# Patient Record
Sex: FEMALE | URBAN - METROPOLITAN AREA
[De-identification: names, ages, dates, MRNs, and addresses within clinical notes are randomized per-mention and may not be internally consistent; named-entity substitution may affect disease eponyms.]

---

## 2021-07-22 ENCOUNTER — HOSPITAL ENCOUNTER (OUTPATIENT)
Age: 36
Setting detail: SPECIMEN
Discharge: HOME | End: 2021-07-22
Payer: COMMERCIAL

## 2021-07-26 LAB
HPV SAMPLE: ABNORMAL
HPV, GENOTYPE 16: NOT DETECTED
HPV, GENOTYPE 18: NOT DETECTED
HPV, HIGH RISK OTHER: DETECTED
HPV, INTERPRETATION: ABNORMAL
SPECIMEN DESCRIPTION: ABNORMAL

## 2021-07-28 LAB — CYTOLOGY REPORT: NORMAL

## 2021-08-24 ENCOUNTER — APPOINTMENT (OUTPATIENT)
Dept: CT IMAGING | Age: 36
DRG: 003 | End: 2021-08-24
Payer: COMMERCIAL

## 2021-08-24 ENCOUNTER — HOSPITAL ENCOUNTER (INPATIENT)
Age: 36
LOS: 29 days | Discharge: NURSING, REHAB OR LONG-TERM CARE | DRG: 003 | End: 2021-09-22
Attending: EMERGENCY MEDICINE | Admitting: SURGERY
Payer: COMMERCIAL

## 2021-08-24 ENCOUNTER — APPOINTMENT (OUTPATIENT)
Dept: GENERAL RADIOLOGY | Age: 36
DRG: 003 | End: 2021-08-24
Payer: COMMERCIAL

## 2021-08-24 DIAGNOSIS — S06.5XAA SUBDURAL HEMATOMA: ICD-10-CM

## 2021-08-24 DIAGNOSIS — I61.5 INTRAVENTRICULAR HEMORRHAGE (HCC): ICD-10-CM

## 2021-08-24 DIAGNOSIS — S32.9XXA CLOSED DISPLACED FRACTURE OF PELVIS, UNSPECIFIED PART OF PELVIS, INITIAL ENCOUNTER (HCC): ICD-10-CM

## 2021-08-24 DIAGNOSIS — I60.9 SUBARACHNOID HEMORRHAGE (HCC): ICD-10-CM
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
## 2021-08-24 PROBLEM — S22.42XA FRACTURE OF MULTIPLE RIBS OF LEFT SIDE: Status: ACTIVE | Noted: 2021-08-24
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
## 2021-08-24 PROBLEM — S32.10XA SACRAL FRACTURE (HCC): Status: ACTIVE | Noted: 2021-08-24

## 2021-08-24 PROBLEM — J96.00 ACUTE RESPIRATORY FAILURE (HCC): Status: ACTIVE | Noted: 2021-08-24

## 2021-08-24 PROBLEM — S32.599A INFERIOR PUBIC RAMUS FRACTURE (HCC): Status: ACTIVE | Noted: 2021-08-24

## 2021-08-24 PROBLEM — S32.409A ACETABULUM FRACTURE (HCC): Status: ACTIVE | Noted: 2021-08-24

## 2021-08-24 LAB
ABO/RH: NORMAL
ALLEN TEST: ABNORMAL
ALLEN TEST: ABNORMAL
ANION GAP SERPL CALCULATED.3IONS-SCNC: 15 MMOL/L (ref 9–17)
ANTIBODY SCREEN: NEGATIVE
ARM BAND NUMBER: NORMAL
BLOOD BANK SPECIMEN: ABNORMAL
BUN BLDV-MCNC: 10 MG/DL (ref 6–20)
CARBOXYHEMOGLOBIN: 3.2 % (ref 0–5)
CHLORIDE BLD-SCNC: 103 MMOL/L (ref 98–107)
CO2: 21 MMOL/L (ref 20–31)
CREAT SERPL-MCNC: 0.68 MG/DL (ref 0.5–0.9)
ETHANOL PERCENT: <0.01 %
ETHANOL: <10 MG/DL
EXPIRATION DATE: NORMAL
FIO2: 100
FIO2: ABNORMAL
GFR AFRICAN AMERICAN: ABNORMAL ML/MIN
GFR NON-AFRICAN AMERICAN: ABNORMAL ML/MIN
GFR SERPL CREATININE-BSD FRML MDRD: ABNORMAL ML/MIN/{1.73_M2}
GFR SERPL CREATININE-BSD FRML MDRD: ABNORMAL ML/MIN/{1.73_M2}
GLUCOSE BLD-MCNC: 148 MG/DL (ref 70–99)
HCG QUALITATIVE: NEGATIVE
HCO3 VENOUS: 22.7 MMOL/L (ref 24–30)
HCT VFR BLD CALC: 42.2 % (ref 36.3–47.1)
HEMOGLOBIN: 13.7 G/DL (ref 11.9–15.1)
INR BLD: 1.1
INR BLD: ABNORMAL
LV EF: 60 %
LVEF MODALITY: NORMAL
MCH RBC QN AUTO: 28.1 PG (ref 25.2–33.5)
MCHC RBC AUTO-ENTMCNC: 32.5 G/DL (ref 28.4–34.8)
MCV RBC AUTO: 86.7 FL (ref 82.6–102.9)
METHEMOGLOBIN: ABNORMAL % (ref 0–1.5)
MODE: ABNORMAL
MODE: ABNORMAL
NEGATIVE BASE EXCESS, ART: ABNORMAL (ref 0–2)
NEGATIVE BASE EXCESS, VEN: 1.6 MMOL/L (ref 0–2)
NOTIFICATION TIME: ABNORMAL
NOTIFICATION: ABNORMAL
NRBC AUTOMATED: 0 PER 100 WBC
O2 DEVICE/FLOW/%: ABNORMAL
O2 DEVICE/FLOW/%: ABNORMAL
O2 SAT, VEN: 95.9 % (ref 60–85)
OXYHEMOGLOBIN: ABNORMAL % (ref 95–98)
PARTIAL THROMBOPLASTIN TIME: 20.2 SEC (ref 20.5–30.5)
PARTIAL THROMBOPLASTIN TIME: ABNORMAL SEC
PATIENT TEMP: 37
PATIENT TEMP: 37.4
PCO2, VEN, TEMP ADJ: ABNORMAL MMHG (ref 39–55)
PCO2, VEN: 39.2 (ref 39–55)
PDW BLD-RTO: 12.2 % (ref 11.8–14.4)
PEEP/CPAP: ABNORMAL
PH VENOUS: 7.38 (ref 7.32–7.42)
PH, VEN, TEMP ADJ: ABNORMAL (ref 7.32–7.42)
PLATELET # BLD: ABNORMAL K/UL (ref 138–453)
PLATELET, FLUORESCENCE: NORMAL K/UL (ref 138–453)
PLATELET, IMMATURE FRACTION: NORMAL % (ref 1.1–10.3)
PMV BLD AUTO: ABNORMAL FL (ref 8.1–13.5)
PO2, VEN, TEMP ADJ: ABNORMAL MMHG (ref 30–50)
PO2, VEN: 77.8 (ref 30–50)
POC HCO3: 25.2 MMOL/L (ref 21–28)
POC LACTIC ACID: 2.01 MMOL/L (ref 0.56–1.39)
POC O2 SATURATION: 100 % (ref 94–98)
POC PCO2 TEMP: 38 MM HG
POC PCO2: 37.6 MM HG (ref 35–48)
POC PH TEMP: 7.43
POC PH: 7.43 (ref 7.35–7.45)
POC PO2 TEMP: 615 MM HG
POC PO2: 612 MM HG (ref 83–108)
POSITIVE BASE EXCESS, ART: 1 (ref 0–3)
POSITIVE BASE EXCESS, VEN: ABNORMAL MMOL/L (ref 0–2)
POTASSIUM SERPL-SCNC: 3.4 MMOL/L (ref 3.7–5.3)
PROTHROMBIN TIME: 11.9 SEC (ref 9.1–12.3)
PROTHROMBIN TIME: ABNORMAL SEC
PSV: ABNORMAL
PT. POSITION: ABNORMAL
RBC # BLD: 4.87 M/UL (ref 3.95–5.11)
RESPIRATORY RATE: ABNORMAL
SAMPLE SITE: ABNORMAL
SAMPLE SITE: ABNORMAL
SARS-COV-2, RAPID: NOT DETECTED
SET RATE: ABNORMAL
SODIUM BLD-SCNC: 139 MMOL/L (ref 135–144)
SPECIMEN DESCRIPTION: NORMAL
TCO2 (CALC), ART: ABNORMAL MMOL/L (ref 22–29)
TEXT FOR RESPIRATORY: ABNORMAL
TOTAL HB: ABNORMAL G/DL (ref 12–16)
TOTAL RATE: ABNORMAL
TROPONIN INTERP: NORMAL
TROPONIN T: NORMAL NG/ML
TROPONIN, HIGH SENSITIVITY: <6 NG/L (ref 0–14)
VT: ABNORMAL
WBC # BLD: 27.3 K/UL (ref 3.5–11.3)

## 2021-08-24 PROCEDURE — 82565 ASSAY OF CREATININE: CPT

## 2021-08-24 PROCEDURE — 36620 INSERTION CATHETER ARTERY: CPT

## 2021-08-24 PROCEDURE — 2000000000 HC ICU R&B

## 2021-08-24 PROCEDURE — 85610 PROTHROMBIN TIME: CPT

## 2021-08-24 PROCEDURE — 99283 EMERGENCY DEPT VISIT LOW MDM: CPT

## 2021-08-24 PROCEDURE — 80051 ELECTROLYTE PANEL: CPT

## 2021-08-24 PROCEDURE — 84703 CHORIONIC GONADOTROPIN ASSAY: CPT

## 2021-08-24 PROCEDURE — 70486 CT MAXILLOFACIAL W/O DYE: CPT

## 2021-08-24 PROCEDURE — 84484 ASSAY OF TROPONIN QUANT: CPT

## 2021-08-24 PROCEDURE — 70450 CT HEAD/BRAIN W/O DYE: CPT

## 2021-08-24 PROCEDURE — 6370000000 HC RX 637 (ALT 250 FOR IP): Performed by: STUDENT IN AN ORGANIZED HEALTH CARE EDUCATION/TRAINING PROGRAM

## 2021-08-24 PROCEDURE — 71045 X-RAY EXAM CHEST 1 VIEW: CPT

## 2021-08-24 PROCEDURE — G0480 DRUG TEST DEF 1-7 CLASSES: HCPCS

## 2021-08-24 PROCEDURE — 72125 CT NECK SPINE W/O DYE: CPT

## 2021-08-24 PROCEDURE — 71260 CT THORAX DX C+: CPT

## 2021-08-24 PROCEDURE — 94002 VENT MGMT INPAT INIT DAY: CPT

## 2021-08-24 PROCEDURE — 2500000003 HC RX 250 WO HCPCS: Performed by: STUDENT IN AN ORGANIZED HEALTH CARE EDUCATION/TRAINING PROGRAM

## 2021-08-24 PROCEDURE — 82805 BLOOD GASES W/O2 SATURATION: CPT

## 2021-08-24 PROCEDURE — 3209999900 CT THORACIC SPINE TRAUMA RECONSTRUCTION

## 2021-08-24 PROCEDURE — 85055 RETICULATED PLATELET ASSAY: CPT

## 2021-08-24 PROCEDURE — 5A1955Z RESPIRATORY VENTILATION, GREATER THAN 96 CONSECUTIVE HOURS: ICD-10-PCS | Performed by: SURGERY

## 2021-08-24 PROCEDURE — 83605 ASSAY OF LACTIC ACID: CPT

## 2021-08-24 PROCEDURE — 85730 THROMBOPLASTIN TIME PARTIAL: CPT

## 2021-08-24 PROCEDURE — 2580000003 HC RX 258: Performed by: STUDENT IN AN ORGANIZED HEALTH CARE EDUCATION/TRAINING PROGRAM

## 2021-08-24 PROCEDURE — 2700000000 HC OXYGEN THERAPY PER DAY

## 2021-08-24 PROCEDURE — 6360000002 HC RX W HCPCS: Performed by: STUDENT IN AN ORGANIZED HEALTH CARE EDUCATION/TRAINING PROGRAM

## 2021-08-24 PROCEDURE — 3209999900 CT LUMBAR SPINE TRAUMA RECONSTRUCTION

## 2021-08-24 PROCEDURE — 93306 TTE W/DOPPLER COMPLETE: CPT

## 2021-08-24 PROCEDURE — 82803 BLOOD GASES ANY COMBINATION: CPT

## 2021-08-24 PROCEDURE — 87635 SARS-COV-2 COVID-19 AMP PRB: CPT

## 2021-08-24 PROCEDURE — 72190 X-RAY EXAM OF PELVIS: CPT

## 2021-08-24 PROCEDURE — 6360000004 HC RX CONTRAST MEDICATION: Performed by: NURSE PRACTITIONER

## 2021-08-24 PROCEDURE — 93005 ELECTROCARDIOGRAM TRACING: CPT | Performed by: STUDENT IN AN ORGANIZED HEALTH CARE EDUCATION/TRAINING PROGRAM

## 2021-08-24 PROCEDURE — 94761 N-INVAS EAR/PLS OXIMETRY MLT: CPT

## 2021-08-24 PROCEDURE — 76376 3D RENDER W/INTRP POSTPROCES: CPT

## 2021-08-24 PROCEDURE — APPNB30 APP NON BILLABLE TIME 0-30 MINS: Performed by: PHYSICIAN ASSISTANT

## 2021-08-24 PROCEDURE — 84520 ASSAY OF UREA NITROGEN: CPT

## 2021-08-24 PROCEDURE — 82947 ASSAY GLUCOSE BLOOD QUANT: CPT

## 2021-08-24 PROCEDURE — 86901 BLOOD TYPING SEROLOGIC RH(D): CPT

## 2021-08-24 PROCEDURE — 86850 RBC ANTIBODY SCREEN: CPT

## 2021-08-24 PROCEDURE — 6810039000 HC L1 TRAUMA ALERT

## 2021-08-24 PROCEDURE — 85027 COMPLETE CBC AUTOMATED: CPT

## 2021-08-24 PROCEDURE — 86900 BLOOD TYPING SEROLOGIC ABO: CPT

## 2021-08-24 RX ORDER — POLYETHYLENE GLYCOL 3350 17 G/17G
17 POWDER, FOR SOLUTION ORAL DAILY
Status: DISCONTINUED | OUTPATIENT
Start: 2021-08-24 | End: 2021-09-01

## 2021-08-24 RX ORDER — ACETAMINOPHEN 500 MG
1000 TABLET ORAL EVERY 8 HOURS SCHEDULED
Status: DISCONTINUED | OUTPATIENT
Start: 2021-08-24 | End: 2021-09-13

## 2021-08-24 RX ORDER — ONDANSETRON 4 MG/1
4 TABLET, ORALLY DISINTEGRATING ORAL EVERY 8 HOURS PRN
Status: DISCONTINUED | OUTPATIENT
Start: 2021-08-24 | End: 2021-08-31

## 2021-08-24 RX ORDER — FENTANYL CITRATE 50 UG/ML
INJECTION, SOLUTION INTRAMUSCULAR; INTRAVENOUS
Status: DISCONTINUED
Start: 2021-08-24 | End: 2021-08-25

## 2021-08-24 RX ORDER — SODIUM CHLORIDE 0.9 % (FLUSH) 0.9 %
5-40 SYRINGE (ML) INJECTION PRN
Status: DISCONTINUED | OUTPATIENT
Start: 2021-08-24 | End: 2021-09-13

## 2021-08-24 RX ORDER — 0.9 % SODIUM CHLORIDE 0.9 %
500 INTRAVENOUS SOLUTION INTRAVENOUS ONCE
Status: COMPLETED | OUTPATIENT
Start: 2021-08-24 | End: 2021-08-24

## 2021-08-24 RX ORDER — SODIUM PHOSPHATE, DIBASIC AND SODIUM PHOSPHATE, MONOBASIC 7; 19 G/133ML; G/133ML
1 ENEMA RECTAL DAILY PRN
Status: DISCONTINUED | OUTPATIENT
Start: 2021-08-24 | End: 2021-08-25

## 2021-08-24 RX ORDER — MAGNESIUM SULFATE IN WATER 40 MG/ML
2000 INJECTION, SOLUTION INTRAVENOUS ONCE
Status: COMPLETED | OUTPATIENT
Start: 2021-08-24 | End: 2021-08-24

## 2021-08-24 RX ORDER — FENTANYL CITRATE 50 UG/ML
INJECTION, SOLUTION INTRAMUSCULAR; INTRAVENOUS
Status: DISCONTINUED
Start: 2021-08-24 | End: 2021-08-24

## 2021-08-24 RX ORDER — ONDANSETRON 2 MG/ML
4 INJECTION INTRAMUSCULAR; INTRAVENOUS EVERY 6 HOURS PRN
Status: DISCONTINUED | OUTPATIENT
Start: 2021-08-24 | End: 2021-09-02

## 2021-08-24 RX ORDER — SODIUM CHLORIDE 9 MG/ML
INJECTION, SOLUTION INTRAVENOUS CONTINUOUS
Status: DISCONTINUED | OUTPATIENT
Start: 2021-08-24 | End: 2021-08-30

## 2021-08-24 RX ORDER — SODIUM CHLORIDE 9 MG/ML
25 INJECTION, SOLUTION INTRAVENOUS PRN
Status: DISCONTINUED | OUTPATIENT
Start: 2021-08-24 | End: 2021-08-31

## 2021-08-24 RX ORDER — METHOCARBAMOL 750 MG/1
750 TABLET, FILM COATED ORAL EVERY 6 HOURS
Status: DISCONTINUED | OUTPATIENT
Start: 2021-08-24 | End: 2021-08-29

## 2021-08-24 RX ORDER — SODIUM CHLORIDE 0.9 % (FLUSH) 0.9 %
5-40 SYRINGE (ML) INJECTION EVERY 12 HOURS SCHEDULED
Status: DISCONTINUED | OUTPATIENT
Start: 2021-08-24 | End: 2021-09-13

## 2021-08-24 RX ORDER — CHLORHEXIDINE GLUCONATE 0.12 MG/ML
15 RINSE ORAL 2 TIMES DAILY
Status: DISCONTINUED | OUTPATIENT
Start: 2021-08-24 | End: 2021-09-15

## 2021-08-24 RX ORDER — DEXMEDETOMIDINE HYDROCHLORIDE 4 UG/ML
.2-1.4 INJECTION, SOLUTION INTRAVENOUS CONTINUOUS
Status: DISCONTINUED | OUTPATIENT
Start: 2021-08-24 | End: 2021-08-25

## 2021-08-24 RX ORDER — BISACODYL 10 MG
10 SUPPOSITORY, RECTAL RECTAL DAILY
Status: DISCONTINUED | OUTPATIENT
Start: 2021-08-24 | End: 2021-08-30

## 2021-08-24 RX ORDER — GABAPENTIN 300 MG/1
300 CAPSULE ORAL EVERY 8 HOURS
Status: DISCONTINUED | OUTPATIENT
Start: 2021-08-24 | End: 2021-08-29

## 2021-08-24 RX ORDER — OXYCODONE HYDROCHLORIDE 5 MG/1
5 TABLET ORAL EVERY 6 HOURS PRN
Status: DISCONTINUED | OUTPATIENT
Start: 2021-08-24 | End: 2021-08-25

## 2021-08-24 RX ORDER — PROPOFOL 10 MG/ML
INJECTION, EMULSION INTRAVENOUS
Status: DISCONTINUED
Start: 2021-08-24 | End: 2021-08-25

## 2021-08-24 RX ADMIN — DEXMEDETOMIDINE HYDROCHLORIDE 0.2 MCG/KG/HR: 4 INJECTION, SOLUTION INTRAVENOUS at 15:59

## 2021-08-24 RX ADMIN — CHLORHEXIDINE GLUCONATE 0.12% ORAL RINSE 15 ML: 1.2 LIQUID ORAL at 21:29

## 2021-08-24 RX ADMIN — ACETAMINOPHEN 1000 MG: 500 TABLET ORAL at 21:29

## 2021-08-24 RX ADMIN — SODIUM CHLORIDE, PRESERVATIVE FREE 10 ML: 5 INJECTION INTRAVENOUS at 21:29

## 2021-08-24 RX ADMIN — IOPAMIDOL 130 ML: 755 INJECTION, SOLUTION INTRAVENOUS at 13:29

## 2021-08-24 RX ADMIN — SODIUM CHLORIDE: 9 INJECTION, SOLUTION INTRAVENOUS at 15:57

## 2021-08-24 RX ADMIN — FAMOTIDINE 20 MG: 10 INJECTION INTRAVENOUS at 21:29

## 2021-08-24 RX ADMIN — METHOCARBAMOL TABLETS 750 MG: 750 TABLET, COATED ORAL at 17:00

## 2021-08-24 RX ADMIN — METHOCARBAMOL TABLETS 750 MG: 750 TABLET, COATED ORAL at 21:29

## 2021-08-24 RX ADMIN — GABAPENTIN 300 MG: 300 CAPSULE ORAL at 17:00

## 2021-08-24 RX ADMIN — SODIUM CHLORIDE 500 ML: 0.9 INJECTION, SOLUTION INTRAVENOUS at 19:48

## 2021-08-24 RX ADMIN — MAGNESIUM SULFATE 2000 MG: 2 INJECTION INTRAVENOUS at 15:59

## 2021-08-24 RX ADMIN — Medication 50 MCG/HR: at 17:39

## 2021-08-24 RX ADMIN — ACETAMINOPHEN 1000 MG: 500 TABLET ORAL at 15:58

## 2021-08-24 ASSESSMENT — PULMONARY FUNCTION TESTS
PIF_VALUE: 21
PIF_VALUE: 18
PIF_VALUE: 18
PIF_VALUE: 19
PIF_VALUE: 24
PIF_VALUE: 19
PIF_VALUE: 27
PIF_VALUE: 19
PIF_VALUE: 20
PIF_VALUE: 24

## 2021-08-24 NOTE — PLAN OF CARE
Problem: OXYGENATION/RESPIRATORY FUNCTION  Goal: Patient will maintain patent airway  8/24/2021 1916 by Kennedy Mujica RCP  Outcome: Ongoing     Problem: OXYGENATION/RESPIRATORY FUNCTION  Goal: Patient will achieve/maintain normal respiratory rate/effort  Description: Respiratory rate and effort will be within normal limits for the patient  8/24/2021 1916 by Kennedy Mujica RCP  Outcome: Ongoing     Problem: MECHANICAL VENTILATION  Goal: Patient will maintain patent airway  8/24/2021 1916 by Kennedy Mujica RCP  Outcome: Ongoing     Problem: MECHANICAL VENTILATION  Goal: Oral health is maintained or improved  8/24/2021 1916 by Kennedy Mujica RCP  Outcome: Ongoing     Problem: MECHANICAL VENTILATION  Goal: ET tube will be managed safely  8/24/2021 1916 by Kennedy Mujica RCP  Outcome: Ongoing     Problem: MECHANICAL VENTILATION  Goal: Ability to express needs and understand communication  8/24/2021 1916 by Kennedy Mujica RCP  Outcome: Ongoing     Problem: MECHANICAL VENTILATION  Goal: Mobility/activity is maintained at optimum level for patient  8/24/2021 1916 by Kennedy Mujica RCP  Outcome: Ongoing
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
## 2021-08-24 NOTE — PLAN OF CARE
Problem: OXYGENATION/RESPIRATORY FUNCTION  Goal: Patient will maintain patent airway  8/24/2021 1749 by Beulah Colin RN  Outcome: Ongoing  8/24/2021 1410 by Aida Wheeler RCP  Outcome: Ongoing  Goal: Patient will achieve/maintain normal respiratory rate/effort  Description: Respiratory rate and effort will be within normal limits for the patient  8/24/2021 1749 by Beulah Colin RN  Outcome: Ongoing  8/24/2021 1410 by Aida Wheeler RCP  Outcome: Ongoing     Problem: MECHANICAL VENTILATION  Goal: Patient will maintain patent airway  8/24/2021 1749 by Beulah Colin RN  Outcome: Ongoing  8/24/2021 1410 by Aida Wheeler RCP  Outcome: Ongoing  Goal: Oral health is maintained or improved  8/24/2021 1749 by Beulah Colin RN  Outcome: Ongoing  8/24/2021 1410 by Aida Wheeler RCP  Outcome: Ongoing  Goal: ET tube will be managed safely  8/24/2021 1749 by Beulah Colin RN  Outcome: Ongoing  8/24/2021 1410 by Aida Wheeler RCP  Outcome: Ongoing  Goal: Ability to express needs and understand communication  8/24/2021 [phone] by Beulah Colin RN  Outcome: Ongoing  8/24/2021 1410 by Aida Wheeler RCP  Outcome: Ongoing  Goal: Mobility/activity is maintained at optimum level for patient  8/24/2021 1749 by Beulah Colin RN  Outcome: Ongoing  8/24/2021 1410 by Aida Wheeler RCP  Outcome: Ongoing     Problem: SKIN INTEGRITY  Goal: Skin integrity is maintained or improved  8/24/2021 1749 by Beulah Colin RN  Outcome: Ongoing  8/24/2021 1410 by Aida Wheeler RCP  Outcome: Ongoing     Problem: Confusion - Acute:  Goal: Absence of continued neurological deterioration signs and symptoms  Description: Absence of continued neurological deterioration signs and symptoms  Outcome: Ongoing  Goal: Mental status will be restored to baseline  Description: Mental status will be restored to baseline  Outcome: Ongoing     Problem: Discharge Planning:  Goal: Ability to perform activities of daily living will improve  Description: Ability to perform activities of daily living will improve  Outcome: Ongoing  Goal: Participates in care planning  Description: Participates in care planning  Outcome: Ongoing     Problem: Injury - Risk of, Physical Injury:  Goal: Absence of physical injury  Description: Absence of physical injury  Outcome: Ongoing  Goal: Will remain free from falls  Description: Will remain free from falls  Outcome: Ongoing     Problem: Mood - Altered:  Goal: Mood stable  Description: Mood stable  Outcome: Ongoing  Goal: Absence of abusive behavior  Description: Absence of abusive behavior  Outcome: Ongoing  Goal: Verbalizations of feeling emotionally comfortable while being cared for will increase  Description: Verbalizations of feeling emotionally comfortable while being cared for will increase  Outcome: Ongoing     Problem: Psychomotor Activity - Altered:  Goal: Absence of psychomotor disturbance signs and symptoms  Description: Absence of psychomotor disturbance signs and symptoms  Outcome: Ongoing     Problem: Sensory Perception - Impaired:  Goal: Demonstrations of improved sensory functioning will increase  Description: Demonstrations of improved sensory functioning will increase  Outcome: Ongoing  Goal: Decrease in sensory misperception frequency  Description: Decrease in sensory misperception frequency  Outcome: Ongoing  Goal: Able to refrain from responding to false sensory perceptions  Description: Able to refrain from responding to false sensory perceptions  Outcome: Ongoing  Goal: Demonstrates accurate environmental perceptions  Description: Demonstrates accurate environmental perceptions  Outcome: Ongoing  Goal: Able to distinguish between reality-based and nonreality-based thinking  Description: Able to distinguish between reality-based and nonreality-based thinking  Outcome: Ongoing  Goal: Able to interrupt nonreality-based thinking  Description: Able to interrupt nonreality-based thinking  Outcome: Ongoing     Problem: Sleep Pattern Disturbance:  Goal: Appears well-rested  Description: Appears well-rested  Outcome: Ongoing     Problem: Skin Integrity:  Goal: Will show no infection signs and symptoms  Description: Will show no infection signs and symptoms  Outcome: Ongoing  Goal: Absence of new skin breakdown  Description: Absence of new skin breakdown  Outcome: Ongoing     Problem: Falls - Risk of:  Goal: Absence of physical injury  Description: Absence of physical injury  Outcome: Ongoing  Goal: Will remain free from falls  Description: Will remain free from falls  Outcome: Ongoing     Problem: Non-Violent Restraints  Goal: Removal from restraints as soon as assessed to be safe  Outcome: Ongoing  Goal: No harm/injury to patient while restraints in use  Outcome: Ongoing  Goal: Patient's dignity will be maintained  Outcome: Ongoing

## 2021-08-24 NOTE — PROCEDURES
PROCEDURE NOTE - ARTERIAL LINE PLACEMENT    PATIENT NAME: Deepali Carcamo  MEDICAL RECORD NO. [id number]  DATE: 8/24/2021  ATTENDING PHYSICIAN:  Hugh Ramos      PREOPERATIVE DIAGNOSIS:  Need for blood pressure monitoring  POSTOPERATIVE DIAGNOSIS:  Same  PROCEDURE PERFORMED: Right Femoral Arterial Line Insertion  PERFORMING PHYSICIAN:  Tremayne Mcadams MD  ESTIMATED BLOOD LOSS:  Less than 25 ml  COMPLICATIONS:  None immediately appreciated. DISCUSSION:  Deepali Carcamo is a 38 y.o. female who requires invasive pressure monitoring. The history and physical examination were reviewed and confirmed. CONSENT: Unable to be obtained due to patient's condition. PROCEDURE:  A timeout was initiated by the bedside nurse and was confirmed by those present. The patient was placed in a supine position. The skin overlying the Right Femoral was prepped with chlorhexadine. Through this region, the introducer needle through catheter was inserted into  until pulsatile bright blood was seen in collection tubing. Guidewire was advanced with no resistance. Catheter was advanced into the artery, wire was pulled with brisk bleeding noted. Pressure monitoring setup was connected to the catheter, it aspirated and flushed easily. The catheter was secured to the wrist with 3-0 silk. No immediate complication was evident. All sponge, instrument and needle counts were correct at the completion of the procedure.       Chucky Salas MD  6:13 PM, 8/24/21
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
## 2021-08-25 ENCOUNTER — APPOINTMENT (OUTPATIENT)
Dept: GENERAL RADIOLOGY | Age: 36
DRG: 003 | End: 2021-08-25
Payer: COMMERCIAL

## 2021-08-25 ENCOUNTER — APPOINTMENT (OUTPATIENT)
Dept: CT IMAGING | Age: 36
DRG: 003 | End: 2021-08-25
Payer: COMMERCIAL

## 2021-08-25 LAB
ABSOLUTE EOS #: <0.03 K/UL (ref 0–0.44)
ABSOLUTE IMMATURE GRANULOCYTE: 0.11 K/UL (ref 0–0.3)
ABSOLUTE LYMPH #: 1.3 K/UL (ref 1.1–3.7)
ABSOLUTE MONO #: 0.96 K/UL (ref 0.1–1.2)
ALLEN TEST: ABNORMAL
ANION GAP SERPL CALCULATED.3IONS-SCNC: 10 MMOL/L (ref 9–17)
BASOPHILS # BLD: 0 % (ref 0–2)
BASOPHILS ABSOLUTE: 0.04 K/UL (ref 0–0.2)
BUN BLDV-MCNC: 16 MG/DL (ref 6–20)
BUN/CREAT BLD: ABNORMAL (ref 9–20)
CALCIUM IONIZED: 1.14 MMOL/L (ref 1.13–1.33)
CALCIUM SERPL-MCNC: 8.5 MG/DL (ref 8.6–10.4)
CHLORIDE BLD-SCNC: 107 MMOL/L (ref 98–107)
CO2: 21 MMOL/L (ref 20–31)
CREAT SERPL-MCNC: 0.55 MG/DL (ref 0.5–0.9)
DIFFERENTIAL TYPE: ABNORMAL
EKG ATRIAL RATE: 109 BPM
EKG P AXIS: 73 DEGREES
EKG P-R INTERVAL: 130 MS
EKG Q-T INTERVAL: 390 MS
EKG QRS DURATION: 90 MS
EKG QTC CALCULATION (BAZETT): 525 MS
EKG R AXIS: 75 DEGREES
EKG T AXIS: 59 DEGREES
EKG VENTRICULAR RATE: 109 BPM
EOSINOPHILS RELATIVE PERCENT: 0 % (ref 1–4)
FIO2: 30
GFR AFRICAN AMERICAN: >60 ML/MIN
GFR NON-AFRICAN AMERICAN: >60 ML/MIN
GFR SERPL CREATININE-BSD FRML MDRD: ABNORMAL ML/MIN/{1.73_M2}
GFR SERPL CREATININE-BSD FRML MDRD: ABNORMAL ML/MIN/{1.73_M2}
GLUCOSE BLD-MCNC: 123 MG/DL (ref 74–100)
GLUCOSE BLD-MCNC: 131 MG/DL (ref 70–99)
HCT VFR BLD CALC: 33.3 % (ref 36.3–47.1)
HEMOGLOBIN: 11.1 G/DL (ref 11.9–15.1)
IMMATURE GRANULOCYTES: 1 %
LYMPHOCYTES # BLD: 7 % (ref 24–43)
MAGNESIUM: 2.1 MG/DL (ref 1.6–2.6)
MCH RBC QN AUTO: 28.5 PG (ref 25.2–33.5)
MCHC RBC AUTO-ENTMCNC: 33.3 G/DL (ref 28.4–34.8)
MCV RBC AUTO: 85.4 FL (ref 82.6–102.9)
MODE: ABNORMAL
MONOCYTES # BLD: 5 % (ref 3–12)
NEGATIVE BASE EXCESS, ART: ABNORMAL (ref 0–2)
NRBC AUTOMATED: 0 PER 100 WBC
O2 DEVICE/FLOW/%: ABNORMAL
PATIENT TEMP: 37.9
PDW BLD-RTO: 12.8 % (ref 11.8–14.4)
PHOSPHORUS: 4 MG/DL (ref 2.6–4.5)
PLATELET # BLD: 230 K/UL (ref 138–453)
PLATELET ESTIMATE: ABNORMAL
PMV BLD AUTO: 9.2 FL (ref 8.1–13.5)
POC HCO3: 23.5 MMOL/L (ref 21–28)
POC LACTIC ACID: 1.23 MMOL/L (ref 0.56–1.39)
POC O2 SATURATION: 99 % (ref 94–98)
POC PCO2 TEMP: 33 MM HG
POC PCO2: 31.6 MM HG (ref 35–48)
POC PH TEMP: 7.47
POC PH: 7.48 (ref 7.35–7.45)
POC PO2 TEMP: 148 MM HG
POC PO2: 142.8 MM HG (ref 83–108)
POSITIVE BASE EXCESS, ART: 0 (ref 0–3)
POTASSIUM SERPL-SCNC: 3.9 MMOL/L (ref 3.7–5.3)
RBC # BLD: 3.9 M/UL (ref 3.95–5.11)
RBC # BLD: ABNORMAL 10*6/UL
SAMPLE SITE: ABNORMAL
SEG NEUTROPHILS: 87 % (ref 36–65)
SEGMENTED NEUTROPHILS ABSOLUTE COUNT: 15.58 K/UL (ref 1.5–8.1)
SODIUM BLD-SCNC: 138 MMOL/L (ref 135–144)
TCO2 (CALC), ART: ABNORMAL MMOL/L (ref 22–29)
VITAMIN D 25-HYDROXY: 7.6 NG/ML (ref 30–100)
WBC # BLD: 18 K/UL (ref 3.5–11.3)
WBC # BLD: ABNORMAL 10*3/UL

## 2021-08-25 PROCEDURE — 82947 ASSAY GLUCOSE BLOOD QUANT: CPT

## 2021-08-25 PROCEDURE — 80048 BASIC METABOLIC PNL TOTAL CA: CPT

## 2021-08-25 PROCEDURE — 2500000003 HC RX 250 WO HCPCS: Performed by: STUDENT IN AN ORGANIZED HEALTH CARE EDUCATION/TRAINING PROGRAM

## 2021-08-25 PROCEDURE — 6360000002 HC RX W HCPCS: Performed by: STUDENT IN AN ORGANIZED HEALTH CARE EDUCATION/TRAINING PROGRAM

## 2021-08-25 PROCEDURE — 82306 VITAMIN D 25 HYDROXY: CPT

## 2021-08-25 PROCEDURE — 73562 X-RAY EXAM OF KNEE 3: CPT

## 2021-08-25 PROCEDURE — 08QRXZZ REPAIR LEFT LOWER EYELID, EXTERNAL APPROACH: ICD-10-PCS | Performed by: SURGERY

## 2021-08-25 PROCEDURE — 6370000000 HC RX 637 (ALT 250 FOR IP): Performed by: STUDENT IN AN ORGANIZED HEALTH CARE EDUCATION/TRAINING PROGRAM

## 2021-08-25 PROCEDURE — 94003 VENT MGMT INPAT SUBQ DAY: CPT

## 2021-08-25 PROCEDURE — 2580000003 HC RX 258: Performed by: STUDENT IN AN ORGANIZED HEALTH CARE EDUCATION/TRAINING PROGRAM

## 2021-08-25 PROCEDURE — 93010 ELECTROCARDIOGRAM REPORT: CPT | Performed by: INTERNAL MEDICINE

## 2021-08-25 PROCEDURE — 6360000002 HC RX W HCPCS

## 2021-08-25 PROCEDURE — 84100 ASSAY OF PHOSPHORUS: CPT

## 2021-08-25 PROCEDURE — 08QPXZZ REPAIR LEFT UPPER EYELID, EXTERNAL APPROACH: ICD-10-PCS | Performed by: SURGERY

## 2021-08-25 PROCEDURE — 82803 BLOOD GASES ANY COMBINATION: CPT

## 2021-08-25 PROCEDURE — 70450 CT HEAD/BRAIN W/O DYE: CPT

## 2021-08-25 PROCEDURE — 94761 N-INVAS EAR/PLS OXIMETRY MLT: CPT

## 2021-08-25 PROCEDURE — 6360000002 HC RX W HCPCS: Performed by: NURSE PRACTITIONER

## 2021-08-25 PROCEDURE — 83605 ASSAY OF LACTIC ACID: CPT

## 2021-08-25 PROCEDURE — 6370000000 HC RX 637 (ALT 250 FOR IP): Performed by: NURSE PRACTITIONER

## 2021-08-25 PROCEDURE — 83735 ASSAY OF MAGNESIUM: CPT

## 2021-08-25 PROCEDURE — 85025 COMPLETE CBC W/AUTO DIFF WBC: CPT

## 2021-08-25 PROCEDURE — 82330 ASSAY OF CALCIUM: CPT

## 2021-08-25 PROCEDURE — 51798 US URINE CAPACITY MEASURE: CPT

## 2021-08-25 PROCEDURE — APPNB30 APP NON BILLABLE TIME 0-30 MINS: Performed by: PHYSICIAN ASSISTANT

## 2021-08-25 PROCEDURE — 2700000000 HC OXYGEN THERAPY PER DAY

## 2021-08-25 PROCEDURE — 2000000000 HC ICU R&B

## 2021-08-25 PROCEDURE — 37799 UNLISTED PX VASCULAR SURGERY: CPT

## 2021-08-25 PROCEDURE — 71045 X-RAY EXAM CHEST 1 VIEW: CPT

## 2021-08-25 RX ORDER — 0.9 % SODIUM CHLORIDE 0.9 %
500 INTRAVENOUS SOLUTION INTRAVENOUS ONCE
Status: COMPLETED | OUTPATIENT
Start: 2021-08-25 | End: 2021-08-25

## 2021-08-25 RX ORDER — ERGOCALCIFEROL 1.25 MG/1
50000 CAPSULE ORAL WEEKLY
Qty: 8 CAPSULE | Refills: 0 | Status: SHIPPED | OUTPATIENT
Start: 2021-08-25 | End: 2021-10-14

## 2021-08-25 RX ORDER — FENTANYL CITRATE 50 UG/ML
50 INJECTION, SOLUTION INTRAMUSCULAR; INTRAVENOUS
Status: DISCONTINUED | OUTPATIENT
Start: 2021-08-25 | End: 2021-08-26

## 2021-08-25 RX ORDER — LEVETIRACETAM 100 MG/ML
1000 SOLUTION ORAL 2 TIMES DAILY
Status: COMPLETED | OUTPATIENT
Start: 2021-08-25 | End: 2021-08-31

## 2021-08-25 RX ORDER — NOREPINEPHRINE BIT/0.9 % NACL 16MG/250ML
INFUSION BOTTLE (ML) INTRAVENOUS
Status: DISCONTINUED
Start: 2021-08-25 | End: 2021-08-25

## 2021-08-25 RX ORDER — FENTANYL CITRATE 50 UG/ML
50 INJECTION, SOLUTION INTRAMUSCULAR; INTRAVENOUS ONCE
Status: COMPLETED | OUTPATIENT
Start: 2021-08-25 | End: 2021-08-26

## 2021-08-25 RX ORDER — LIDOCAINE HYDROCHLORIDE 10 MG/ML
5 INJECTION, SOLUTION INFILTRATION; PERINEURAL ONCE
Status: DISCONTINUED | OUTPATIENT
Start: 2021-08-25 | End: 2021-08-25

## 2021-08-25 RX ORDER — FENTANYL CITRATE 50 UG/ML
INJECTION, SOLUTION INTRAMUSCULAR; INTRAVENOUS
Status: COMPLETED
Start: 2021-08-25 | End: 2021-08-25

## 2021-08-25 RX ORDER — METOCLOPRAMIDE HYDROCHLORIDE 5 MG/ML
10 INJECTION INTRAMUSCULAR; INTRAVENOUS EVERY 6 HOURS
Status: DISPENSED | OUTPATIENT
Start: 2021-08-25 | End: 2021-08-28

## 2021-08-25 RX ORDER — FAMOTIDINE 20 MG/1
20 TABLET, FILM COATED ORAL 2 TIMES DAILY
Status: DISCONTINUED | OUTPATIENT
Start: 2021-08-25 | End: 2021-09-13

## 2021-08-25 RX ADMIN — SODIUM CHLORIDE 1000 ML: 9 INJECTION, SOLUTION INTRAVENOUS at 21:45

## 2021-08-25 RX ADMIN — CHLORHEXIDINE GLUCONATE 0.12% ORAL RINSE 15 ML: 1.2 LIQUID ORAL at 08:09

## 2021-08-25 RX ADMIN — FENTANYL CITRATE 50 MCG: 50 INJECTION, SOLUTION INTRAMUSCULAR; INTRAVENOUS at 20:15

## 2021-08-25 RX ADMIN — FENTANYL CITRATE 50 MCG: 50 INJECTION, SOLUTION INTRAMUSCULAR; INTRAVENOUS at 15:53

## 2021-08-25 RX ADMIN — CHLORHEXIDINE GLUCONATE 0.12% ORAL RINSE 15 ML: 1.2 LIQUID ORAL at 20:15

## 2021-08-25 RX ADMIN — FAMOTIDINE 20 MG: 20 TABLET, FILM COATED ORAL at 21:31

## 2021-08-25 RX ADMIN — GABAPENTIN 300 MG: 300 CAPSULE ORAL at 08:08

## 2021-08-25 RX ADMIN — METHOCARBAMOL TABLETS 750 MG: 750 TABLET, COATED ORAL at 08:08

## 2021-08-25 RX ADMIN — METHOCARBAMOL TABLETS 750 MG: 750 TABLET, COATED ORAL at 15:26

## 2021-08-25 RX ADMIN — LEVETIRACETAM 1000 MG: 500 SOLUTION ORAL at 21:30

## 2021-08-25 RX ADMIN — ACETAMINOPHEN 1000 MG: 500 TABLET ORAL at 21:32

## 2021-08-25 RX ADMIN — ACETAMINOPHEN 1000 MG: 500 TABLET ORAL at 15:26

## 2021-08-25 RX ADMIN — LEVETIRACETAM 1000 MG: 500 SOLUTION ORAL at 15:27

## 2021-08-25 RX ADMIN — GABAPENTIN 300 MG: 300 CAPSULE ORAL at 15:26

## 2021-08-25 RX ADMIN — BISACODYL 10 MG: 10 SUPPOSITORY RECTAL at 08:08

## 2021-08-25 RX ADMIN — SODIUM CHLORIDE, PRESERVATIVE FREE 10 ML: 5 INJECTION INTRAVENOUS at 20:16

## 2021-08-25 RX ADMIN — FAMOTIDINE 20 MG: 10 INJECTION INTRAVENOUS at 08:08

## 2021-08-25 RX ADMIN — GABAPENTIN 300 MG: 300 CAPSULE ORAL at 00:09

## 2021-08-25 RX ADMIN — POLYETHYLENE GLYCOL 3350 17 G: 17 POWDER, FOR SOLUTION ORAL at 08:09

## 2021-08-25 RX ADMIN — FENTANYL CITRATE 50 MCG: 50 INJECTION, SOLUTION INTRAMUSCULAR; INTRAVENOUS at 22:56

## 2021-08-25 RX ADMIN — DEXMEDETOMIDINE HYDROCHLORIDE 0.5 MCG/KG/HR: 4 INJECTION, SOLUTION INTRAVENOUS at 04:12

## 2021-08-25 RX ADMIN — SODIUM CHLORIDE 1000 ML: 9 INJECTION, SOLUTION INTRAVENOUS at 04:20

## 2021-08-25 RX ADMIN — METHOCARBAMOL TABLETS 750 MG: 750 TABLET, COATED ORAL at 04:09

## 2021-08-25 RX ADMIN — METOCLOPRAMIDE 10 MG: 5 INJECTION, SOLUTION INTRAMUSCULAR; INTRAVENOUS at 15:26

## 2021-08-25 RX ADMIN — METOCLOPRAMIDE 10 MG: 5 INJECTION, SOLUTION INTRAMUSCULAR; INTRAVENOUS at 20:16

## 2021-08-25 RX ADMIN — SODIUM CHLORIDE, PRESERVATIVE FREE 10 ML: 5 INJECTION INTRAVENOUS at 08:09

## 2021-08-25 RX ADMIN — ACETAMINOPHEN 1000 MG: 500 TABLET ORAL at 06:30

## 2021-08-25 RX ADMIN — SODIUM CHLORIDE 500 ML: 9 INJECTION, SOLUTION INTRAVENOUS at 08:39

## 2021-08-25 RX ADMIN — METHOCARBAMOL TABLETS 750 MG: 750 TABLET, COATED ORAL at 21:31

## 2021-08-25 ASSESSMENT — PULMONARY FUNCTION TESTS
PIF_VALUE: 17
PIF_VALUE: 16
PIF_VALUE: 18
PIF_VALUE: 18
PIF_VALUE: 19
PIF_VALUE: 16
PIF_VALUE: 16
PIF_VALUE: 17
PIF_VALUE: 18
PIF_VALUE: 18
PIF_VALUE: 16

## 2021-08-25 NOTE — PLAN OF CARE
BRONCHOSPASM/BRONCHOCONSTRICTION     [x]         IMPROVE AERATION/BREATH SOUNDS  [x]   ADMINISTER BRONCHODILATOR THERAPY AS APPROPRIATE  [x]   ASSESS BREATH SOUNDS  [x]   IMPLEMENT AEROSOL/MDI PROTOCOL  [x]   PATIENT EDUCATION AS NEEDED   PROVIDE ADEQUATE OXYGENATION WITH ACCEPTABLE SP02/ABG'S    [x]  IDENTIFY APPROPRIATE OXYGEN THERAPY  [x]   MONITOR SP02/ABG'S AS NEEDED   [x]   PATIENT EDUCATION AS NEEDED   MECHANICAL VENTILATION     [x]  PROVIDE OPTIMAL VENTILATION  [x]   ASSESS FOR EXTUBATION READINESS  [x]   ASSESS FOR WEANING READINESS  [x]  EXTUBATE AS TOLERATED  [x]  IMPLEMENT ADULT MECHANICAL VENTILATION PROTOCOL  [x]  MAINTAIN ADEQUATE OXYGENATION    Problem: OXYGENATION/RESPIRATORY FUNCTION  Goal: Patient will maintain patent airway  8/25/2021 0854 by Debi Russell RCP  Outcome: Ongoing  8/25/2021 0035 by Brandy Wyman RN  Outcome: Ongoing  8/24/2021 1916 by Akanksha Oglesby RCP  Outcome: Ongoing  Goal: Patient will achieve/maintain normal respiratory rate/effort  Description: Respiratory rate and effort will be within normal limits for the patient  8/25/2021 0854 by Debi Russell RCP  Outcome: Ongoing  8/25/2021 0035 by Brandy Wyman RN  Outcome: Ongoing  8/24/2021 1916 by Akanksha Oglesby RCP  Outcome: Ongoing     PERFORM SPONTANEOUS WEANING TRIAL AS TOLERATED

## 2021-08-25 NOTE — PROGRESS NOTES
[facility] Pt[facility] was transported to CT Scan. Assisting with the transport was Jeremie Banuelos, [address] [place] [address]. Appropriate devices were applied to monitor the patient's condition during transport. Patient transported  via 30% O2 via ventilator. Patient tolerated well.         Teo Burr RCP  9:05 PM
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
## 2021-08-25 NOTE — PROGRESS NOTES
[facility]up[facility] [address]  Occupational Therapy Not Seen Note    DATE: 2021    NAME: Chel Guzman  MRN: [id number]   : 1985      Patient not seen this date for Occupational Therapy due to:    Patient is not appropriate for active participation in OT evaluation/treatment at this time d/t intubation and SBR.     Next Scheduled Treatment: Check back      Electronically signed by Marsha Villaseñor OT on 2021 at 9:28 AM

## 2021-08-25 NOTE — PLAN OF CARE
Problem: OXYGENATION/RESPIRATORY FUNCTION  Goal: Patient will maintain patent airway  8/25/2021 0035 by Melecio Azevedo RN  Outcome: Ongoing  8/24/2021 1916 by Aj Mendez RCP  Outcome: Ongoing  8/24/2021 1749 by Kandi Baker RN  Outcome: Ongoing  8/24/2021 1410 by Ken Flores RCP  Outcome: Ongoing  Goal: Patient will achieve/maintain normal respiratory rate/effort  Description: Respiratory rate and effort will be within normal limits for the patient  8/25/2021 0035 by Melecio Azevedo RN  Outcome: Ongoing  8/24/2021 1916 by Aj Mendez RCP  Outcome: Ongoing  8/24/2021 1749 by Kandi Baker RN  Outcome: Ongoing  8/24/2021 1410 by Ken Flores RCP  Outcome: Ongoing     Problem: MECHANICAL VENTILATION  Goal: Patient will maintain patent airway  8/25/2021 0035 by Melecio Azevedo RN  Outcome: Ongoing  8/24/2021 1916 by AMMY NelsonP  Outcome: Ongoing  8/24/2021 1749 by Kandi Baker RN  Outcome: Ongoing  8/24/2021 1410 by Ken Flores RCP  Outcome: Ongoing  Goal: Oral health is maintained or improved  8/25/2021 0035 by Melecio Azevedo RN  Outcome: Ongoing  8/24/2021 1916 by Aj Mendez RCP  Outcome: Ongoing  8/24/2021 1749 by Kandi Baker RN  Outcome: Ongoing  8/24/2021 1410 by Ken Flores RCP  Outcome: Ongoing  Goal: ET tube will be managed safely  8/25/2021 0035 by Melecio Azevedo RN  Outcome: Ongoing  8/24/2021 1916 by Aj Mendez RCP  Outcome: Ongoing  8/24/2021 1749 by Kandi Baker RN  Outcome: Ongoing  8/24/2021 1410 by Ken Flores RCP  Outcome: Ongoing  Goal: Ability to express needs and understand communication  8/25/2021 0035 by Melecio Azevedo RN  Outcome: Ongoing  8/24/2021 1916 by Aj Mendez RCP  Outcome: Ongoing  8/24/2021 1749 by Kandi Baker RN  Outcome: Ongoing  8/24/2021 1410 by Ken Flores RCP  Outcome: Ongoing  Goal: Mobility/activity is maintained at optimum level for patient  8/25/2021 0035 by Altered:  Goal: Mood stable  Description: Mood stable  8/25/2021 0035 by Brandy Wyman RN  Outcome: Ongoing  8/24/2021 1749 by Beulah Colin RN  Outcome: Ongoing  Goal: Absence of abusive behavior  Description: Absence of abusive behavior  8/25/2021 0035 by Brandy Wyman RN  Outcome: Ongoing  8/24/2021 1749 by Beulah Colin RN  Outcome: Ongoing  Goal: Verbalizations of feeling emotionally comfortable while being cared for will increase  Description: Verbalizations of feeling emotionally comfortable while being cared for will increase  8/25/2021 0035 by Brandy Wyman RN  Outcome: Ongoing  8/24/2021 1749 by Beulah Colin RN  Outcome: Ongoing     Problem: Psychomotor Activity - Altered:  Goal: Absence of psychomotor disturbance signs and symptoms  Description: Absence of psychomotor disturbance signs and symptoms  8/25/2021 0035 by Brandy Wyman RN  Outcome: Ongoing  8/24/2021 1749 by Beulah Colin RN  Outcome: Ongoing     Problem: Sensory Perception - Impaired:  Goal: Demonstrations of improved sensory functioning will increase  Description: Demonstrations of improved sensory functioning will increase  8/25/2021 0035 by Brandy Wyman RN  Outcome: Ongoing  8/24/2021 1749 by Beulah Colin RN  Outcome: Ongoing  Goal: Decrease in sensory misperception frequency  Description: Decrease in sensory misperception frequency  8/25/2021 0035 by Brandy Wyman RN  Outcome: Ongoing  8/24/2021 1749 by Beulah Colin RN  Outcome: Ongoing  Goal: Able to refrain from responding to false sensory perceptions  Description: Able to refrain from responding to false sensory perceptions  8/25/2021 0035 by Brandy Wyman RN  Outcome: Ongoing  8/24/2021 1749 by Beulah Colin RN  Outcome: Ongoing  Goal: Demonstrates accurate environmental perceptions  Description: Demonstrates accurate environmental perceptions  8/25/2021 0035 by Brandy Wyman RN  Outcome: Ongoing  8/24/2021 1749 by Daniel Walton RN  Outcome: Ongoing  Goal: Able to distinguish between reality-based and nonreality-based thinking  Description: Able to distinguish between reality-based and nonreality-based thinking  8/25/2021 0035 by Mariano Dumont RN  Outcome: Ongoing  8/24/2021 1749 by Daniel Walton RN  Outcome: Ongoing  Goal: Able to interrupt nonreality-based thinking  Description: Able to interrupt nonreality-based thinking  8/25/2021 0035 by Mariano Dumont RN  Outcome: Ongoing  8/24/2021 1749 by Daniel Walton RN  Outcome: Ongoing     Problem: Sleep Pattern Disturbance:  Goal: Appears well-rested  Description: Appears well-rested  8/25/2021 0035 by Mariano Dumont RN  Outcome: Ongoing  8/24/2021 1749 by Daniel Walton RN  Outcome: Ongoing     Problem: Skin Integrity:  Goal: Will show no infection signs and symptoms  Description: Will show no infection signs and symptoms  8/25/2021 0035 by Mariano Dumont RN  Outcome: Ongoing  8/24/2021 1749 by Daniel Walton RN  Outcome: Ongoing  Goal: Absence of new skin breakdown  Description: Absence of new skin breakdown  8/25/2021 0035 by Mariano Dumont RN  Outcome: Ongoing  8/24/2021 1749 by Daniel Walton RN  Outcome: Ongoing     Problem: Falls - Risk of:  Goal: Absence of physical injury  Description: Absence of physical injury  8/25/2021 0035 by Mariano Dumont RN  Outcome: Ongoing  8/24/2021 1749 by Daniel Walton RN  Outcome: Ongoing  Goal: Will remain free from falls  Description: Will remain free from falls  8/25/2021 0035 by Mariano Dumont RN  Outcome: Ongoing  8/24/2021 1749 by Daniel Walton RN  Outcome: Ongoing     Problem: Non-Violent Restraints  Goal: Removal from restraints as soon as assessed to be safe  8/25/2021 0035 by Mariano Dumont RN  Outcome: Ongoing  8/24/2021 1749 by Daniel Walton RN  Outcome: Ongoing  Goal: No harm/injury to patient while restraints in use  8/25/2021 0035 by Nena Tirado SABINA Parekh  Outcome: Ongoing  8/24/2021 1749 by Annie Kruger RN  Outcome: Ongoing  Goal: Patient's dignity will be maintained  8/25/2021 0035 by Zane Bocanegra RN  Outcome: Ongoing  8/24/2021 1749 by Annie Kruger RN  Outcome: Ongoing

## 2021-08-25 NOTE — PROGRESS NOTES
Physical Therapy        Physical Therapy Cancel Note      DATE: 2021    NAME: Cassidy Lauren  MRN: [id number]   : 1985      Patient not seen this date for Physical Therapy due to: Other: Strict bedrest. Intubated/sedated.       Electronically signed by Cristóbal Pruitt PT on 2021 at 10:51 AM

## 2021-08-25 NOTE — PLAN OF CARE
Problem: OXYGENATION/RESPIRATORY FUNCTION  Goal: Patient will maintain patent airway  8/25/2021 1828 by Francois Yoo RN  Outcome: Ongoing  8/25/2021 0854 by Douglas Vides RCP  Outcome: Ongoing  Goal: Patient will achieve/maintain normal respiratory rate/effort  Description: Respiratory rate and effort will be within normal limits for the patient  8/25/2021 1828 by Francois Yoo RN  Outcome: Ongoing  8/25/2021 0854 by Douglas Vides RCP  Outcome: Ongoing     Problem: MECHANICAL VENTILATION  Goal: Patient will maintain patent airway  8/25/2021 1828 by Francois Yoo RN  Outcome: Ongoing  8/25/2021 0854 by Douglas Vides RCP  Outcome: Ongoing  Goal: Oral health is maintained or improved  8/25/2021 1828 by Francois Yoo RN  Outcome: Ongoing  8/25/2021 0854 by Douglas Vides RCP  Outcome: Ongoing  Goal: ET tube will be managed safely  8/25/2021 1828 by Francois Yoo RN  Outcome: Ongoing  8/25/2021 0854 by Douglas Vides RCP  Outcome: Ongoing  Goal: Ability to express needs and understand communication  8/25/2021 0854 by Douglas Vides RCP  Outcome: Ongoing  Goal: Mobility/activity is maintained at optimum level for patient  8/25/2021 0854 by Douglas Vides RCP  Outcome: Ongoing     Problem: SKIN INTEGRITY  Goal: Skin integrity is maintained or improved  8/25/2021 1828 by Francois Yoo RN  Outcome: Ongoing  8/25/2021 0854 by Douglas Vides RCP  Outcome: Ongoing     Problem: Confusion - Acute:  Goal: Absence of continued neurological deterioration signs and symptoms  Description: Absence of continued neurological deterioration signs and symptoms  Outcome: Ongoing  Goal: Mental status will be restored to baseline  Description: Mental status will be restored to baseline  Outcome: Ongoing     Problem: Injury - Risk of, Physical Injury:  Goal: Absence of physical injury  Description: Absence of physical injury  Outcome: Ongoing  Goal: Will remain free from falls  Description: Will remain free from falls  Outcome: Ongoing     Problem: Mood - Altered:  Goal: Mood stable  Description: Mood stable  Outcome: Ongoing  Goal: Absence of abusive behavior  Description: Absence of abusive behavior  Outcome: Ongoing  Goal: Verbalizations of feeling emotionally comfortable while being cared for will increase  Description: Verbalizations of feeling emotionally comfortable while being cared for will increase  Outcome: Ongoing     Problem: Psychomotor Activity - Altered:  Goal: Absence of psychomotor disturbance signs and symptoms  Description: Absence of psychomotor disturbance signs and symptoms  Outcome: Ongoing     Problem: Sensory Perception - Impaired:  Goal: Demonstrations of improved sensory functioning will increase  Description: Demonstrations of improved sensory functioning will increase  Outcome: Ongoing  Goal: Decrease in sensory misperception frequency  Description: Decrease in sensory misperception frequency  Outcome: Ongoing  Goal: Able to refrain from responding to false sensory perceptions  Description: Able to refrain from responding to false sensory perceptions  Outcome: Ongoing  Goal: Demonstrates accurate environmental perceptions  Description: Demonstrates accurate environmental perceptions  Outcome: Ongoing  Goal: Able to distinguish between reality-based and nonreality-based thinking  Description: Able to distinguish between reality-based and nonreality-based thinking  Outcome: Ongoing  Goal: Able to interrupt nonreality-based thinking  Description: Able to interrupt nonreality-based thinking  Outcome: Ongoing     Problem: Sleep Pattern Disturbance:  Goal: Appears well-rested  Description: Appears well-rested  Outcome: Ongoing     Problem: Skin Integrity:  Goal: Will show no infection signs and symptoms  Description: Will show no infection signs and symptoms  Outcome: Ongoing  Goal: Absence of new skin breakdown  Description: Absence of new skin breakdown  Outcome: Ongoing     Problem: Falls - Risk of:  Goal: Absence of physical injury  Description: Absence of physical injury  Outcome: Ongoing  Goal: Will remain free from falls  Description: Will remain free from falls  Outcome: Ongoing     Problem: Non-Violent Restraints  Goal: Removal from restraints as soon as assessed to be safe  Outcome: Ongoing  Goal: No harm/injury to patient while restraints in use  Outcome: Ongoing  Goal: Patient's dignity will be maintained  Outcome: Ongoing     Problem: Nutrition  Goal: Optimal nutrition therapy  8/25/2021 1828 by Kosta Villanueva RN  Outcome: Ongoing  8/25/2021 1441 by Harshad Acosta RD, LD  Note: Nutrition Problem #1: Inadequate oral intake  Intervention: Food and/or Nutrient Delivery: Continue NPO, Start Tube Feeding  Nutritional Goals: EN intake to meet >75% of estimated nutrition needs

## 2021-08-25 NOTE — CONSULTS
Comprehensive Nutrition Assessment    Type and Reason for Visit:  Initial, Consult (TF Order/Management)    Nutrition Recommendations/Plan:   - Continue NPO  - Start tube feedings via OG tube with immune enhancing formula (Pivot 1.5 Yazan) with a goal rate of 45 mL/hr  - Recommend to start Vitamin D supplementation due to lab value of 7.6 ng/mL  - Monitor tube feeding tolerance/adequacy     Nutrition Assessment:  Consulted for tube feeding order/management. Patient on vent at time of visit. Receiving no nutrition at present. OG tube in place to low-intermittent suction. Labs/Meds reviewed. Malnutrition Assessment:  Malnutrition Status:  Insufficient data    Context:  Acute Illness     Findings of the 6 clinical characteristics of malnutrition:  Energy Intake:  Mild decrease in energy intake   Weight Loss:  Unable to assess     Body Fat Loss:  Unable to assess     Muscle Mass Loss:  Unable to assess    Fluid Accumulation:  No significant fluid accumulation     Strength:  Not Performed    Estimated Daily Nutrient Needs:  Energy (kcal):  6163-8541 kcal/d (23-25 kcal/kg); Weight Used for Energy Requirements:  Admission     Protein (g):  95 g protein/d (1.5 g/kg); Weight Used for Protein Requirements:  Admission        Fluid (ml/day):  7476-3451 mL fluid/d or per MD; Method Used for Fluid Requirements:  ml/Kg (25-30 mL)      Nutrition Related Findings:  Labs: Vitamin D 7.6 (L). Meds: Reglan, Dulcolax, Glycolax. Date of last BM unknown. +Hypoactive bowel sounds. Wounds:   Traumatic wound to left eye       Current Nutrition Therapies:    Diet NPO Exceptions are: Sips of Water with Meds  ADULT TUBE FEEDING; Orogastric; Immune Enhancing; Continuous; 25; No; 30;  Other (specify); none  Current Tube Feeding (TF) Orders:  · Feeding Route: Orogastric  · Formula: Immune Enhancing (Pivot 1.5 Yazan)  · Schedule: Continuous  · Water Flushes: Per MD  · Goal TF & Flush Orders Provides: @ 45 mL/hr = 1620 kcal, 101 g protein, 810 mL free water    Anthropometric Measures:  · Height: 5' 5\" (165.1 cm)  · Current Body Weight: 140 lb 3.4 oz (63.6 kg)   · Admission Body Weight: 140 lb 3.4 oz (63.6 kg)    · Usual Body Weight: 137 lb 13 oz (62.5 kg) (7/22/2021)     · Ideal Body Weight: 125 lbs; % Ideal Body Weight 112.2 %   · BMI: 23.3  · Adjusted Body Weight:  No Adjustment   · BMI Categories: Normal Weight (BMI 18.5-24. 9)       Nutrition Diagnosis:   · Inadequate oral intake related to impaired respiratory function as evidenced by NPO or clear liquid status due to medical condition, intubation, need for enteral nutrition    Nutrition Interventions:   Food and/or Nutrient Delivery:  Continue NPO, Start Tube Feeding  Nutrition Education/Counseling:  No recommendation at this time   Coordination of Nutrition Care:  Continue to monitor while inpatient    Goals:  EN intake to meet >75% of estimated nutrition needs       Nutrition Monitoring and Evaluation:   Behavioral-Environmental Outcomes:  None Identified   Food/Nutrient Intake Outcomes:  Enteral Nutrition Intake/Tolerance  Physical Signs/Symptoms Outcomes:  Biochemical Data, GI Status, Fluid Status or Edema, Nutrition Focused Physical Findings, Skin, Weight     Discharge Planning:     Too soon to determine     Electronically signed by Darcy Brady RD, LD on 8/25/21 at 2:39 PM EDT    Contact: [phone]

## 2021-08-25 NOTE — FLOWSHEET NOTE
SPIRITUAL CARE DEPARTMENT - [address]  PROGRESS NOTE    Shift date: 8.24.2021  Shift day: Tuesday   Shift # 2    Room # 6542/5350-43   Name: Damion Hankins            Age: 38 y.o. Gender: female          Protestant: unknown   Place of Yarsanism: unknown    Referral: Routine Visit    Admit Date & Time: 8/24/2021 12:56 PM    PATIENT/EVENT DESCRIPTION:  Damion Hankins is a 38 y.o. female who needs family support      SPIRITUAL ASSESSMENT/INTERVENTION:   responded to request to find family.  was able to identify an address and telephone numbers for mother.  has [place] police check mother's listed address but there was no occupancy. Was give two numbers linked to mother. Neither number was connected to the mother.  preformed an extensive search but unable to retrieve any helpful information regarding family at this time. SPIRITUAL CARE FOLLOW-UP PLAN:  Chaplains will remain available to offer spiritual and emotional support as needed.       Electronically signed by Savannah Martinez on 8/24/2021 at 9:23 PM.  [address]  [phone]       08/24/21 1640   Encounter Summary   Services provided to: Patient   Referral/Consult From: Other    Support System Parent   Continue Visiting   ([phone])   Complexity of Encounter Low   Length of Encounter 45 minutes   Routine   Type Follow up   Assessment Unable to respond     Electronically signed by Lucillie Ganser on 8/24/2021 at 9:23 PM

## 2021-08-25 NOTE — PROGRESS NOTES
The transport originated from TICU. Pt. was transported to CT Scan. Assisting with the transport was Claudy Hicks [facility]. Appropriate devices were applied to monitor the patient's condition during transport. Patient transported  via 30% O2 via ventilator. Patient tolerated well.         Esteban Freeman RCP  5:46 AM

## 2021-08-25 NOTE — PROCEDURES
PROCEDURE NOTE - LACERATION CLOSURE    PATIENT NAME: Judith Shearer  MEDICAL RECORD NO. [id number]  DATE: 8/25/2021  SURGEON: Tal Guerrero  PRIMARY CARE PHYSICIAN: No primary care provider on file. PREOPERATIVE DIAGNOSIS: Laceration(s) as follows:   LOCATION: just lateral of the left lateral canthus   LENGTH: 1cm   LAYERED CLOSURE: No    POSTOPERATIVE DIAGNOSIS:  Same  PROCEDURE PERFORMED:  Suture closure of laceration  ANESTHESIA:  Local utilizing  Lidocaine 1% without epinephrine  ESTIMATED BLOOD LOSS:  Less than 25 ml. COMPLICATIONS:  None immediately appreciated. OPERATIVE NOTE PREPARED BY: Bridger Busby MD     DISCUSSION:  Judith Shearer is a 38y.o.-year-old female. The history and physical examination were reviewed and confirmed. The diagnoses, proposed procedure, risks, possible complications, benefits and alternatives were discussed with the patient or family. She was given the opportunity to ask questions, and once answered, informed consent was obtained. The patient was then prepared for the procedure. PROCEDURE:  A timeout was initiated and the procedure and patient were confirmed by those present. The wound area was cleansed with chlorhexidine gluconate and draped in a sterile fashion. The wound area was anesthetized with Lidocaine 1% without epinephrine without added sodium bicarbonate. The wound was explored with the following results No foreign bodies found. The wound was repaired with 5-0 chromic gut; 3 sutures were used. The wound was dressed and antibiotic ointment was applied. No immediate complication was evident. All sponge, instrument and needle counts were correct at the completion of the procedure.        Bridger Busby MD  8/25/21, 12:50 AM

## 2021-08-26 ENCOUNTER — APPOINTMENT (OUTPATIENT)
Dept: GENERAL RADIOLOGY | Age: 36
DRG: 003 | End: 2021-08-26
Payer: COMMERCIAL

## 2021-08-26 ENCOUNTER — ANESTHESIA (OUTPATIENT)
Dept: OPERATING ROOM | Age: 36
DRG: 003 | End: 2021-08-26
Payer: COMMERCIAL

## 2021-08-26 ENCOUNTER — APPOINTMENT (OUTPATIENT)
Dept: CT IMAGING | Age: 36
DRG: 003 | End: 2021-08-26
Payer: COMMERCIAL

## 2021-08-26 ENCOUNTER — ANESTHESIA EVENT (OUTPATIENT)
Dept: OPERATING ROOM | Age: 36
DRG: 003 | End: 2021-08-26
Payer: COMMERCIAL

## 2021-08-26 VITALS
RESPIRATION RATE: 15 BRPM | SYSTOLIC BLOOD PRESSURE: 104 MMHG | DIASTOLIC BLOOD PRESSURE: 71 MMHG | TEMPERATURE: 98.1 F | OXYGEN SATURATION: 100 %

## 2021-08-26 LAB
ABSOLUTE EOS #: 0.06 K/UL (ref 0–0.44)
ABSOLUTE EOS #: 0.09 K/UL (ref 0–0.44)
ABSOLUTE IMMATURE GRANULOCYTE: 0.08 K/UL (ref 0–0.3)
ABSOLUTE IMMATURE GRANULOCYTE: 0.13 K/UL (ref 0–0.3)
ABSOLUTE LYMPH #: 1.34 K/UL (ref 1.1–3.7)
ABSOLUTE LYMPH #: 2.29 K/UL (ref 1.1–3.7)
ABSOLUTE MONO #: 1.1 K/UL (ref 0.1–1.2)
ABSOLUTE MONO #: 1.22 K/UL (ref 0.1–1.2)
ALLEN TEST: ABNORMAL
ALLEN TEST: ABNORMAL
ANION GAP SERPL CALCULATED.3IONS-SCNC: 8 MMOL/L (ref 9–17)
ANION GAP SERPL CALCULATED.3IONS-SCNC: 9 MMOL/L (ref 9–17)
BASOPHILS # BLD: 0 % (ref 0–2)
BASOPHILS # BLD: 0 % (ref 0–2)
BASOPHILS ABSOLUTE: 0.06 K/UL (ref 0–0.2)
BASOPHILS ABSOLUTE: 0.06 K/UL (ref 0–0.2)
BUN BLDV-MCNC: 12 MG/DL (ref 6–20)
BUN BLDV-MCNC: 15 MG/DL (ref 6–20)
BUN/CREAT BLD: ABNORMAL (ref 9–20)
BUN/CREAT BLD: ABNORMAL (ref 9–20)
CALCIUM SERPL-MCNC: 8 MG/DL (ref 8.6–10.4)
CALCIUM SERPL-MCNC: 8 MG/DL (ref 8.6–10.4)
CHLORIDE BLD-SCNC: 109 MMOL/L (ref 98–107)
CHLORIDE BLD-SCNC: 109 MMOL/L (ref 98–107)
CO2: 22 MMOL/L (ref 20–31)
CO2: 23 MMOL/L (ref 20–31)
CREAT SERPL-MCNC: 0.36 MG/DL (ref 0.5–0.9)
CREAT SERPL-MCNC: 0.44 MG/DL (ref 0.5–0.9)
DIFFERENTIAL TYPE: ABNORMAL
DIFFERENTIAL TYPE: ABNORMAL
EOSINOPHILS RELATIVE PERCENT: 0 % (ref 1–4)
EOSINOPHILS RELATIVE PERCENT: 1 % (ref 1–4)
FIO2: 30
FIO2: 30
GFR AFRICAN AMERICAN: >60 ML/MIN
GFR AFRICAN AMERICAN: >60 ML/MIN
GFR NON-AFRICAN AMERICAN: >60 ML/MIN
GFR NON-AFRICAN AMERICAN: >60 ML/MIN
GFR SERPL CREATININE-BSD FRML MDRD: ABNORMAL ML/MIN/{1.73_M2}
GLUCOSE BLD-MCNC: 105 MG/DL (ref 70–99)
GLUCOSE BLD-MCNC: 121 MG/DL (ref 74–100)
GLUCOSE BLD-MCNC: 90 MG/DL (ref 70–99)
HCT VFR BLD CALC: 30.4 % (ref 36.3–47.1)
HCT VFR BLD CALC: 31.5 % (ref 36.3–47.1)
HEMOGLOBIN: 10.3 G/DL (ref 11.9–15.1)
HEMOGLOBIN: 9.8 G/DL (ref 11.9–15.1)
IMMATURE GRANULOCYTES: 1 %
IMMATURE GRANULOCYTES: 1 %
LYMPHOCYTES # BLD: 12 % (ref 24–43)
LYMPHOCYTES # BLD: 9 % (ref 24–43)
MAGNESIUM: 1.9 MG/DL (ref 1.6–2.6)
MAGNESIUM: 2 MG/DL (ref 1.6–2.6)
MCH RBC QN AUTO: 27.9 PG (ref 25.2–33.5)
MCH RBC QN AUTO: 28.1 PG (ref 25.2–33.5)
MCHC RBC AUTO-ENTMCNC: 32.2 G/DL (ref 28.4–34.8)
MCHC RBC AUTO-ENTMCNC: 32.7 G/DL (ref 28.4–34.8)
MCV RBC AUTO: 85.4 FL (ref 82.6–102.9)
MCV RBC AUTO: 87.1 FL (ref 82.6–102.9)
MODE: ABNORMAL
MODE: ABNORMAL
MONOCYTES # BLD: 7 % (ref 3–12)
MONOCYTES # BLD: 7 % (ref 3–12)
NEGATIVE BASE EXCESS, ART: 1 (ref 0–2)
NEGATIVE BASE EXCESS, ART: ABNORMAL (ref 0–2)
NRBC AUTOMATED: 0 PER 100 WBC
NRBC AUTOMATED: 0 PER 100 WBC
O2 DEVICE/FLOW/%: ABNORMAL
O2 DEVICE/FLOW/%: ABNORMAL
PATIENT TEMP: ABNORMAL
PATIENT TEMP: ABNORMAL
PDW BLD-RTO: 12.7 % (ref 11.8–14.4)
PDW BLD-RTO: 12.9 % (ref 11.8–14.4)
PHOSPHORUS: 2.6 MG/DL (ref 2.6–4.5)
PHOSPHORUS: 2.7 MG/DL (ref 2.6–4.5)
PLATELET # BLD: 168 K/UL (ref 138–453)
PLATELET # BLD: 185 K/UL (ref 138–453)
PLATELET ESTIMATE: ABNORMAL
PLATELET ESTIMATE: ABNORMAL
PMV BLD AUTO: 9.4 FL (ref 8.1–13.5)
PMV BLD AUTO: 9.5 FL (ref 8.1–13.5)
POC HCO3: 23.2 MMOL/L (ref 21–28)
POC HCO3: 25.4 MMOL/L (ref 21–28)
POC LACTIC ACID: 0.53 MMOL/L (ref 0.56–1.39)
POC LACTIC ACID: 0.69 MMOL/L (ref 0.56–1.39)
POC O2 SATURATION: 99 % (ref 94–98)
POC O2 SATURATION: 99 % (ref 94–98)
POC PCO2 TEMP: ABNORMAL MM HG
POC PCO2 TEMP: ABNORMAL MM HG
POC PCO2: 36.8 MM HG (ref 35–48)
POC PCO2: 42.2 MM HG (ref 35–48)
POC PH TEMP: ABNORMAL
POC PH TEMP: ABNORMAL
POC PH: 7.39 (ref 7.35–7.45)
POC PH: 7.41 (ref 7.35–7.45)
POC PO2 TEMP: ABNORMAL MM HG
POC PO2 TEMP: ABNORMAL MM HG
POC PO2: 133.1 MM HG (ref 83–108)
POC PO2: 136 MM HG (ref 83–108)
POSITIVE BASE EXCESS, ART: 0 (ref 0–3)
POSITIVE BASE EXCESS, ART: ABNORMAL (ref 0–3)
POTASSIUM SERPL-SCNC: 3.6 MMOL/L (ref 3.7–5.3)
POTASSIUM SERPL-SCNC: 4 MMOL/L (ref 3.7–5.3)
RBC # BLD: 3.49 M/UL (ref 3.95–5.11)
RBC # BLD: 3.69 M/UL (ref 3.95–5.11)
RBC # BLD: ABNORMAL 10*6/UL
RBC # BLD: ABNORMAL 10*6/UL
SAMPLE SITE: ABNORMAL
SAMPLE SITE: ABNORMAL
SEG NEUTROPHILS: 80 % (ref 36–65)
SEG NEUTROPHILS: 82 % (ref 36–65)
SEGMENTED NEUTROPHILS ABSOLUTE COUNT: 12.95 K/UL (ref 1.5–8.1)
SEGMENTED NEUTROPHILS ABSOLUTE COUNT: 14.79 K/UL (ref 1.5–8.1)
SODIUM BLD-SCNC: 139 MMOL/L (ref 135–144)
SODIUM BLD-SCNC: 141 MMOL/L (ref 135–144)
SODIUM BLD-SCNC: 141 MMOL/L (ref 135–144)
SODIUM BLD-SCNC: 144 MMOL/L (ref 135–144)
TCO2 (CALC), ART: ABNORMAL MMOL/L (ref 22–29)
TCO2 (CALC), ART: ABNORMAL MMOL/L (ref 22–29)
WBC # BLD: 15.6 K/UL (ref 3.5–11.3)
WBC # BLD: 18.6 K/UL (ref 3.5–11.3)
WBC # BLD: ABNORMAL 10*3/UL
WBC # BLD: ABNORMAL 10*3/UL

## 2021-08-26 PROCEDURE — 6360000002 HC RX W HCPCS

## 2021-08-26 PROCEDURE — 51702 INSERT TEMP BLADDER CATH: CPT

## 2021-08-26 PROCEDURE — 00C40ZZ EXTIRPATION OF MATTER FROM INTRACRANIAL SUBDURAL SPACE, OPEN APPROACH: ICD-10-PCS | Performed by: NEUROLOGICAL SURGERY

## 2021-08-26 PROCEDURE — 6370000000 HC RX 637 (ALT 250 FOR IP): Performed by: STUDENT IN AN ORGANIZED HEALTH CARE EDUCATION/TRAINING PROGRAM

## 2021-08-26 PROCEDURE — 94003 VENT MGMT INPAT SUBQ DAY: CPT

## 2021-08-26 PROCEDURE — 6360000002 HC RX W HCPCS: Performed by: STUDENT IN AN ORGANIZED HEALTH CARE EDUCATION/TRAINING PROGRAM

## 2021-08-26 PROCEDURE — 51798 US URINE CAPACITY MEASURE: CPT

## 2021-08-26 PROCEDURE — 94761 N-INVAS EAR/PLS OXIMETRY MLT: CPT

## 2021-08-26 PROCEDURE — 71045 X-RAY EXAM CHEST 1 VIEW: CPT

## 2021-08-26 PROCEDURE — 70450 CT HEAD/BRAIN W/O DYE: CPT

## 2021-08-26 PROCEDURE — 6360000002 HC RX W HCPCS: Performed by: EMERGENCY MEDICINE

## 2021-08-26 PROCEDURE — 3600000004 HC SURGERY LEVEL 4 BASE: Performed by: NEUROLOGICAL SURGERY

## 2021-08-26 PROCEDURE — 37799 UNLISTED PX VASCULAR SURGERY: CPT

## 2021-08-26 PROCEDURE — 2700000000 HC OXYGEN THERAPY PER DAY

## 2021-08-26 PROCEDURE — 51701 INSERT BLADDER CATHETER: CPT

## 2021-08-26 PROCEDURE — 3700000001 HC ADD 15 MINUTES (ANESTHESIA): Performed by: NEUROLOGICAL SURGERY

## 2021-08-26 PROCEDURE — APPNB60 APP NON BILLABLE TIME 46-60 MINS: Performed by: REGISTERED NURSE

## 2021-08-26 PROCEDURE — 82803 BLOOD GASES ANY COMBINATION: CPT

## 2021-08-26 PROCEDURE — 85025 COMPLETE CBC W/AUTO DIFF WBC: CPT

## 2021-08-26 PROCEDURE — 2580000003 HC RX 258: Performed by: NURSE ANESTHETIST, CERTIFIED REGISTERED

## 2021-08-26 PROCEDURE — 6360000002 HC RX W HCPCS: Performed by: NURSE ANESTHETIST, CERTIFIED REGISTERED

## 2021-08-26 PROCEDURE — C1729 CATH, DRAINAGE: HCPCS | Performed by: NEUROLOGICAL SURGERY

## 2021-08-26 PROCEDURE — 2709999900 HC NON-CHARGEABLE SUPPLY: Performed by: NEUROLOGICAL SURGERY

## 2021-08-26 PROCEDURE — 81001 URINALYSIS AUTO W/SCOPE: CPT

## 2021-08-26 PROCEDURE — 2580000003 HC RX 258: Performed by: STUDENT IN AN ORGANIZED HEALTH CARE EDUCATION/TRAINING PROGRAM

## 2021-08-26 PROCEDURE — 2580000003 HC RX 258: Performed by: NEUROLOGICAL SURGERY

## 2021-08-26 PROCEDURE — 6370000000 HC RX 637 (ALT 250 FOR IP): Performed by: NEUROLOGICAL SURGERY

## 2021-08-26 PROCEDURE — 82947 ASSAY GLUCOSE BLOOD QUANT: CPT

## 2021-08-26 PROCEDURE — 4A103BD MONITORING OF INTRACRANIAL PRESSURE, PERCUTANEOUS APPROACH: ICD-10-PCS | Performed by: NEUROLOGICAL SURGERY

## 2021-08-26 PROCEDURE — 80048 BASIC METABOLIC PNL TOTAL CA: CPT

## 2021-08-26 PROCEDURE — 2000000000 HC ICU R&B

## 2021-08-26 PROCEDURE — 2500000003 HC RX 250 WO HCPCS: Performed by: NEUROLOGICAL SURGERY

## 2021-08-26 PROCEDURE — 2500000003 HC RX 250 WO HCPCS: Performed by: NURSE ANESTHETIST, CERTIFIED REGISTERED

## 2021-08-26 PROCEDURE — 3700000000 HC ANESTHESIA ATTENDED CARE: Performed by: NEUROLOGICAL SURGERY

## 2021-08-26 PROCEDURE — 00H032Z INSERTION OF MONITORING DEVICE INTO BRAIN, PERCUTANEOUS APPROACH: ICD-10-PCS | Performed by: NEUROLOGICAL SURGERY

## 2021-08-26 PROCEDURE — 6370000000 HC RX 637 (ALT 250 FOR IP): Performed by: NURSE PRACTITIONER

## 2021-08-26 PROCEDURE — 83605 ASSAY OF LACTIC ACID: CPT

## 2021-08-26 PROCEDURE — C1713 ANCHOR/SCREW BN/BN,TIS/BN: HCPCS | Performed by: NEUROLOGICAL SURGERY

## 2021-08-26 PROCEDURE — 83735 ASSAY OF MAGNESIUM: CPT

## 2021-08-26 PROCEDURE — 2720000010 HC SURG SUPPLY STERILE: Performed by: NEUROLOGICAL SURGERY

## 2021-08-26 PROCEDURE — 3600000014 HC SURGERY LEVEL 4 ADDTL 15MIN: Performed by: NEUROLOGICAL SURGERY

## 2021-08-26 PROCEDURE — 6360000002 HC RX W HCPCS: Performed by: NURSE PRACTITIONER

## 2021-08-26 PROCEDURE — 84100 ASSAY OF PHOSPHORUS: CPT

## 2021-08-26 PROCEDURE — 84295 ASSAY OF SERUM SODIUM: CPT

## 2021-08-26 DEVICE — BONE SCREWS, CROSS-PIN, SELF-DRILLING: Type: IMPLANTABLE DEVICE | Site: CRANIAL | Status: FUNCTIONAL

## 2021-08-26 DEVICE — LOW PROFILE BURR HOLE COVER, W/TAB, 14MM
Type: IMPLANTABLE DEVICE | Site: CRANIAL | Status: FUNCTIONAL
Brand: UNIVERSAL NEURO 2

## 2021-08-26 DEVICE — LOW PROFILE BURR HOLE COVER, W/TAB, 20MM
Type: IMPLANTABLE DEVICE | Site: CRANIAL | Status: FUNCTIONAL
Brand: UNIVERSAL NEURO 2

## 2021-08-26 DEVICE — LOW PROFILE DOUBLE-Y-PLATE, WITH BAR
Type: IMPLANTABLE DEVICE | Site: CRANIAL | Status: FUNCTIONAL
Brand: UNIVERSAL NEURO 2

## 2021-08-26 RX ORDER — FENTANYL CITRATE 50 UG/ML
50 INJECTION, SOLUTION INTRAMUSCULAR; INTRAVENOUS ONCE
Status: DISCONTINUED | OUTPATIENT
Start: 2021-08-26 | End: 2021-08-26

## 2021-08-26 RX ORDER — ROCURONIUM BROMIDE 10 MG/ML
INJECTION, SOLUTION INTRAVENOUS PRN
Status: DISCONTINUED | OUTPATIENT
Start: 2021-08-26 | End: 2021-08-26 | Stop reason: SDUPTHER

## 2021-08-26 RX ORDER — MANNITOL 250 MG/ML
20 INJECTION, SOLUTION INTRAVENOUS ONCE
Status: COMPLETED | OUTPATIENT
Start: 2021-08-26 | End: 2021-08-26

## 2021-08-26 RX ORDER — PROPOFOL 10 MG/ML
5-50 INJECTION, EMULSION INTRAVENOUS
Status: DISCONTINUED | OUTPATIENT
Start: 2021-08-26 | End: 2021-08-29

## 2021-08-26 RX ORDER — FENTANYL CITRATE 50 UG/ML
INJECTION, SOLUTION INTRAMUSCULAR; INTRAVENOUS PRN
Status: DISCONTINUED | OUTPATIENT
Start: 2021-08-26 | End: 2021-08-26 | Stop reason: SDUPTHER

## 2021-08-26 RX ORDER — CEFAZOLIN SODIUM 1 G/50ML
INJECTION, SOLUTION INTRAVENOUS PRN
Status: DISCONTINUED | OUTPATIENT
Start: 2021-08-26 | End: 2021-08-26 | Stop reason: SDUPTHER

## 2021-08-26 RX ORDER — SODIUM CHLORIDE, SODIUM LACTATE, POTASSIUM CHLORIDE, CALCIUM CHLORIDE 600; 310; 30; 20 MG/100ML; MG/100ML; MG/100ML; MG/100ML
INJECTION, SOLUTION INTRAVENOUS CONTINUOUS PRN
Status: DISCONTINUED | OUTPATIENT
Start: 2021-08-26 | End: 2021-08-26

## 2021-08-26 RX ORDER — NOREPINEPHRINE BIT/0.9 % NACL 16MG/250ML
2-100 INFUSION BOTTLE (ML) INTRAVENOUS CONTINUOUS
Status: DISCONTINUED | OUTPATIENT
Start: 2021-08-27 | End: 2021-08-29

## 2021-08-26 RX ORDER — FENTANYL CITRATE 50 UG/ML
50 INJECTION, SOLUTION INTRAMUSCULAR; INTRAVENOUS ONCE
Status: COMPLETED | OUTPATIENT
Start: 2021-08-26 | End: 2021-08-26

## 2021-08-26 RX ORDER — SODIUM CHLORIDE 9 MG/ML
INJECTION, SOLUTION INTRAVENOUS CONTINUOUS PRN
Status: DISCONTINUED | OUTPATIENT
Start: 2021-08-26 | End: 2021-08-26 | Stop reason: SDUPTHER

## 2021-08-26 RX ORDER — MIDAZOLAM HYDROCHLORIDE 2 MG/2ML
4 INJECTION, SOLUTION INTRAMUSCULAR; INTRAVENOUS ONCE
Status: COMPLETED | OUTPATIENT
Start: 2021-08-26 | End: 2021-08-26

## 2021-08-26 RX ORDER — PROPOFOL 10 MG/ML
INJECTION, EMULSION INTRAVENOUS
Status: COMPLETED
Start: 2021-08-26 | End: 2021-08-26

## 2021-08-26 RX ORDER — PROPOFOL 10 MG/ML
INJECTION, EMULSION INTRAVENOUS CONTINUOUS PRN
Status: DISCONTINUED | OUTPATIENT
Start: 2021-08-26 | End: 2021-08-26 | Stop reason: SDUPTHER

## 2021-08-26 RX ORDER — MAGNESIUM HYDROXIDE 1200 MG/15ML
LIQUID ORAL CONTINUOUS PRN
Status: COMPLETED | OUTPATIENT
Start: 2021-08-26 | End: 2021-08-26

## 2021-08-26 RX ORDER — 3% SODIUM CHLORIDE 3 G/100ML
250 INJECTION, SOLUTION INTRAVENOUS ONCE
Status: COMPLETED | OUTPATIENT
Start: 2021-08-26 | End: 2021-08-26

## 2021-08-26 RX ORDER — OXYCODONE HYDROCHLORIDE 5 MG/1
5 TABLET ORAL EVERY 6 HOURS PRN
Status: DISCONTINUED | OUTPATIENT
Start: 2021-08-26 | End: 2021-08-29

## 2021-08-26 RX ORDER — LIDOCAINE HYDROCHLORIDE AND EPINEPHRINE 10; 10 MG/ML; UG/ML
INJECTION, SOLUTION INFILTRATION; PERINEURAL PRN
Status: DISCONTINUED | OUTPATIENT
Start: 2021-08-26 | End: 2021-08-26 | Stop reason: ALTCHOICE

## 2021-08-26 RX ADMIN — CEFAZOLIN SODIUM 2 G: 1 INJECTION, SOLUTION INTRAVENOUS at 12:42

## 2021-08-26 RX ADMIN — FENTANYL CITRATE 50 MCG: 50 INJECTION, SOLUTION INTRAMUSCULAR; INTRAVENOUS at 07:00

## 2021-08-26 RX ADMIN — SODIUM CHLORIDE 250 ML: 3 INJECTION, SOLUTION INTRAVENOUS at 18:19

## 2021-08-26 RX ADMIN — METOCLOPRAMIDE 10 MG: 5 INJECTION, SOLUTION INTRAMUSCULAR; INTRAVENOUS at 01:30

## 2021-08-26 RX ADMIN — SODIUM CHLORIDE: 9 INJECTION, SOLUTION INTRAVENOUS at 12:36

## 2021-08-26 RX ADMIN — CHLORHEXIDINE GLUCONATE 0.12% ORAL RINSE 15 ML: 1.2 LIQUID ORAL at 07:39

## 2021-08-26 RX ADMIN — METHOCARBAMOL TABLETS 750 MG: 750 TABLET, COATED ORAL at 09:52

## 2021-08-26 RX ADMIN — FENTANYL CITRATE 150 MCG: 50 INJECTION, SOLUTION INTRAMUSCULAR; INTRAVENOUS at 12:59

## 2021-08-26 RX ADMIN — OXYCODONE HYDROCHLORIDE 5 MG: 5 TABLET ORAL at 20:09

## 2021-08-26 RX ADMIN — ACETAMINOPHEN 1000 MG: 500 TABLET ORAL at 05:41

## 2021-08-26 RX ADMIN — PROPOFOL 55 MCG/KG/MIN: 10 INJECTION, EMULSION INTRAVENOUS at 22:29

## 2021-08-26 RX ADMIN — METHOCARBAMOL TABLETS 750 MG: 750 TABLET, COATED ORAL at 17:30

## 2021-08-26 RX ADMIN — FENTANYL CITRATE 50 MCG: 50 INJECTION, SOLUTION INTRAMUSCULAR; INTRAVENOUS at 02:13

## 2021-08-26 RX ADMIN — MANNITOL 20 G: 250 INJECTION, SOLUTION INTRAVENOUS at 21:04

## 2021-08-26 RX ADMIN — CHLORHEXIDINE GLUCONATE 0.12% ORAL RINSE 15 ML: 1.2 LIQUID ORAL at 20:09

## 2021-08-26 RX ADMIN — FENTANYL CITRATE 50 MCG: 50 INJECTION, SOLUTION INTRAMUSCULAR; INTRAVENOUS at 10:07

## 2021-08-26 RX ADMIN — BISACODYL 10 MG: 10 SUPPOSITORY RECTAL at 07:38

## 2021-08-26 RX ADMIN — FENTANYL CITRATE 50 MCG: 50 INJECTION, SOLUTION INTRAMUSCULAR; INTRAVENOUS at 04:06

## 2021-08-26 RX ADMIN — SODIUM CHLORIDE: 9 INJECTION, SOLUTION INTRAVENOUS at 11:26

## 2021-08-26 RX ADMIN — SODIUM CHLORIDE, PRESERVATIVE FREE 10 ML: 5 INJECTION INTRAVENOUS at 07:39

## 2021-08-26 RX ADMIN — SODIUM CHLORIDE 1000 ML: 9 INJECTION, SOLUTION INTRAVENOUS at 05:31

## 2021-08-26 RX ADMIN — FENTANYL CITRATE 100 MCG: 50 INJECTION, SOLUTION INTRAMUSCULAR; INTRAVENOUS at 13:14

## 2021-08-26 RX ADMIN — PROPOFOL 10 MCG/KG/MIN: 10 INJECTION, EMULSION INTRAVENOUS at 15:58

## 2021-08-26 RX ADMIN — ROCURONIUM BROMIDE 50 MG: 10 INJECTION INTRAVENOUS at 12:36

## 2021-08-26 RX ADMIN — METHOCARBAMOL TABLETS 750 MG: 750 TABLET, COATED ORAL at 04:05

## 2021-08-26 RX ADMIN — GABAPENTIN 300 MG: 300 CAPSULE ORAL at 00:45

## 2021-08-26 RX ADMIN — SODIUM CHLORIDE, PRESERVATIVE FREE 10 ML: 5 INJECTION INTRAVENOUS at 22:23

## 2021-08-26 RX ADMIN — METOCLOPRAMIDE 10 MG: 5 INJECTION, SOLUTION INTRAMUSCULAR; INTRAVENOUS at 20:09

## 2021-08-26 RX ADMIN — FAMOTIDINE 20 MG: 20 TABLET, FILM COATED ORAL at 07:39

## 2021-08-26 RX ADMIN — POLYETHYLENE GLYCOL 3350 17 G: 17 POWDER, FOR SOLUTION ORAL at 07:39

## 2021-08-26 RX ADMIN — LEVETIRACETAM 1000 MG: 500 SOLUTION ORAL at 07:38

## 2021-08-26 RX ADMIN — PROPOFOL 100 MCG/KG/MIN: 10 INJECTION, EMULSION INTRAVENOUS at 12:36

## 2021-08-26 RX ADMIN — MIDAZOLAM HYDROCHLORIDE 4 MG: 1 INJECTION, SOLUTION INTRAMUSCULAR; INTRAVENOUS at 20:52

## 2021-08-26 RX ADMIN — ACETAMINOPHEN 1000 MG: 500 TABLET ORAL at 22:23

## 2021-08-26 RX ADMIN — METHOCARBAMOL TABLETS 750 MG: 750 TABLET, COATED ORAL at 22:22

## 2021-08-26 RX ADMIN — GABAPENTIN 300 MG: 300 CAPSULE ORAL at 07:38

## 2021-08-26 RX ADMIN — LEVETIRACETAM 1000 MG: 500 SOLUTION ORAL at 22:22

## 2021-08-26 RX ADMIN — FENTANYL CITRATE 50 MCG: 50 INJECTION, SOLUTION INTRAMUSCULAR; INTRAVENOUS at 15:51

## 2021-08-26 RX ADMIN — METOCLOPRAMIDE 10 MG: 5 INJECTION, SOLUTION INTRAMUSCULAR; INTRAVENOUS at 07:38

## 2021-08-26 RX ADMIN — GABAPENTIN 300 MG: 300 CAPSULE ORAL at 17:30

## 2021-08-26 RX ADMIN — FENTANYL CITRATE 50 MCG: 50 INJECTION, SOLUTION INTRAMUSCULAR; INTRAVENOUS at 16:07

## 2021-08-26 RX ADMIN — FAMOTIDINE 20 MG: 20 TABLET, FILM COATED ORAL at 22:22

## 2021-08-26 RX ADMIN — Medication 25 MCG/HR: at 19:36

## 2021-08-26 ASSESSMENT — PULMONARY FUNCTION TESTS
PIF_VALUE: 15
PIF_VALUE: 15
PIF_VALUE: 17
PIF_VALUE: 15
PIF_VALUE: 16
PIF_VALUE: 13
PIF_VALUE: 15
PIF_VALUE: 14
PIF_VALUE: 14
PIF_VALUE: 16
PIF_VALUE: 16
PIF_VALUE: 15
PIF_VALUE: 14
PIF_VALUE: 14
PIF_VALUE: 15
PIF_VALUE: 11
PIF_VALUE: 14
PIF_VALUE: 15
PIF_VALUE: 14
PIF_VALUE: 15
PIF_VALUE: 19
PIF_VALUE: 16
PIF_VALUE: 15
PIF_VALUE: 12
PIF_VALUE: 13
PIF_VALUE: 14
PIF_VALUE: 14
PIF_VALUE: 15
PIF_VALUE: 15
PIF_VALUE: 16
PIF_VALUE: 15
PIF_VALUE: 14
PIF_VALUE: 15
PIF_VALUE: 16
PIF_VALUE: 14
PIF_VALUE: 16
PIF_VALUE: 16
PIF_VALUE: 15
PIF_VALUE: 16
PIF_VALUE: 15
PIF_VALUE: 17
PIF_VALUE: 14
PIF_VALUE: 17
PIF_VALUE: 14
PIF_VALUE: 17
PIF_VALUE: 15
PIF_VALUE: 17
PIF_VALUE: 14
PIF_VALUE: 15
PIF_VALUE: 14
PIF_VALUE: 14
PIF_VALUE: 15
PIF_VALUE: 14
PIF_VALUE: 16
PIF_VALUE: 14
PIF_VALUE: 16
PIF_VALUE: 12
PIF_VALUE: 14
PIF_VALUE: 14
PIF_VALUE: 16
PIF_VALUE: 16
PIF_VALUE: 15
PIF_VALUE: 15
PIF_VALUE: 14
PIF_VALUE: 15
PIF_VALUE: 15
PIF_VALUE: 14
PIF_VALUE: 15
PIF_VALUE: 14
PIF_VALUE: 15
PIF_VALUE: 14
PIF_VALUE: 15
PIF_VALUE: 14
PIF_VALUE: 17
PIF_VALUE: 16
PIF_VALUE: 16
PIF_VALUE: 15
PIF_VALUE: 12
PIF_VALUE: 16
PIF_VALUE: 18
PIF_VALUE: 16
PIF_VALUE: 14
PIF_VALUE: 15
PIF_VALUE: 14
PIF_VALUE: 15
PIF_VALUE: 14
PIF_VALUE: 14
PIF_VALUE: 12
PIF_VALUE: 14
PIF_VALUE: 15
PIF_VALUE: 14
PIF_VALUE: 16
PIF_VALUE: 19
PIF_VALUE: 14
PIF_VALUE: 14
PIF_VALUE: 15
PIF_VALUE: 13
PIF_VALUE: 11
PIF_VALUE: 14
PIF_VALUE: 14
PIF_VALUE: 15
PIF_VALUE: 14
PIF_VALUE: 18
PIF_VALUE: 17
PIF_VALUE: 14
PIF_VALUE: 15
PIF_VALUE: 15
PIF_VALUE: 16
PIF_VALUE: 14
PIF_VALUE: 14
PIF_VALUE: 16
PIF_VALUE: 11
PIF_VALUE: 14
PIF_VALUE: 14
PIF_VALUE: 11
PIF_VALUE: 15
PIF_VALUE: 15
PIF_VALUE: 14
PIF_VALUE: 16
PIF_VALUE: 15
PIF_VALUE: 15
PIF_VALUE: 17
PIF_VALUE: 16
PIF_VALUE: 20
PIF_VALUE: 14
PIF_VALUE: 16

## 2021-08-26 NOTE — ANESTHESIA PROCEDURE NOTES
Arterial Line:    An arterial line was placed using ultrasound guidance and surface landmarks, in the OR for the following indication(s): continuous blood pressure monitoring and blood sampling needed. A 20 gauge (size), (length), Arrow (type) catheter was placed, Seldinger technique not used, into the right brachial artery, secured by Tegaderm and tape. Anesthesia type: General    Events:  patient tolerated procedure well with no complications and EBL < 5mL.   Anesthesiologist: Dhaval Reyes MD  Preanesthetic Checklist  Completed: patient identified, IV checked, site marked, risks and benefits discussed, surgical consent, monitors and equipment checked, pre-op evaluation, timeout performed, anesthesia consent given, oxygen available and patient being monitored

## 2021-08-26 NOTE — PROGRESS NOTES
Occupational [address]  Occupational Therapy Not Seen Note    DATE: 2021    NAME: Tiara Bass  MRN: [id number]   : 1985      Patient not seen this date for Occupational Therapy due to: Other: Pt off floor at OR at this time.      Next Scheduled Treatment: Check back as able     Electronically signed by Tushar Jeronimo OT on 2021 at 1:01 PM
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
## 2021-08-26 NOTE — FLOWSHEET NOTE
SPIRITUAL CARE PROGRESS NOTE    Spiritual Assessment: Kelli Cuevas called spiritual care department regarding patient. Per friend, patient was having surgery today and would like to pray over the phone. Intervention:  listened and validated friends concerns.  facilitated      Outcome: Kelli Cuevas expressed gratitude for prayer and time over the phone phone. She wanted  staff to pass on to the patient 'a little chicken wing is praying for her'  She stated this would cheer her up. Chaplains will remain available to offer spiritual and emotional support as needed. Electronically signed by Enzo Pascal on 8/26/2021 at 1:45 PM       08/26/21 1349   Encounter Summary   Services provided to: Patient not available  (Received Phone Call Lolita Kulkarni) )   Support System Unknown   Complexity of Encounter Low   Length of Encounter 15 minutes   Routine   Type Initial   Assessment Fearful; Hopeful;Coping   Intervention Active listening;Prayer   Outcome Expressed gratitude;Expressed feelings/needs/concerns
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
## 2021-08-26 NOTE — PLAN OF CARE
Problem: OXYGENATION/RESPIRATORY FUNCTION  Goal: Patient will maintain patent airway  8/26/2021 1950 by Moises Ulloa RCP  Outcome: Ongoing     Problem: OXYGENATION/RESPIRATORY FUNCTION  Goal: Patient will achieve/maintain normal respiratory rate/effort  Description: Respiratory rate and effort will be within normal limits for the patient  8/26/2021 1950 by Moises Ulloa RCP  Outcome: Ongoing     Problem: MECHANICAL VENTILATION  Goal: Patient will maintain patent airway  8/26/2021 1950 by Moises Ulloa RCP  Outcome: Ongoing     Problem: MECHANICAL VENTILATION  Goal: Oral health is maintained or improved  8/26/2021 1950 by Moises Ulloa RCP  Outcome: Ongoing     Problem: MECHANICAL VENTILATION  Goal: ET tube will be managed safely  8/26/2021 1950 by Moises Ulloa RCP  Outcome: Ongoing     Problem: MECHANICAL VENTILATION  Goal: Ability to express needs and understand communication  8/26/2021 1950 by Moises Ulloa RCP  Outcome: Ongoing     Problem: MECHANICAL VENTILATION  Goal: Mobility/activity is maintained at optimum level for patient  8/26/2021 1950 by Moises Ulloa RCP  Outcome: Ongoing     Problem: SKIN INTEGRITY  Goal: Skin integrity is maintained or improved  8/26/2021 1950 by Moises Ulloa RCP  Outcome: Ongoing

## 2021-08-26 NOTE — ANESTHESIA PRE PROCEDURE
Department of Anesthesiology  Preprocedure Note       Name:  Maged Gomez   Age:  38 y.o.  :  1985                                          MRN:  [id number]         Date:  2021      Surgeon: Kevin Lozada):  Edmond Bob MD    Procedure: Procedure(s):  CRANIOTOMY FOR SUBDURAL HEMATOMA EVACUATION    Medications prior to admission:   Prior to Admission medications    Medication Sig Start Date End Date Taking?  Authorizing Provider   vitamin D (ERGOCALCIFEROL) 1.25 MG (61144 UT) CAPS capsule Take 1 capsule by mouth once a week for 8 doses 8/25/21 10/14/21 Yes Carolina Albarado DO       Current medications:    Current Facility-Administered Medications   Medication Dose Route Frequency Provider Last Rate Last Admin    sodium chloride 0.9 % irrigation    Continuous PRN Edmond Bob MD   1,250 mL at 21 1150    gelatin adsorbable (GELFOAM) sponge    PRN Edmond Bob MD   1 each at 21 1150    thrombin spray    PRN Edmond Bob MD   20,000 Units at 21 1151    famotidine (PEPCID) tablet 20 mg  20 mg Per NG tube BID [place], DO   20 mg at 21 0739    metoclopramide (REGLAN) injection 10 mg  10 mg IntraVENous Q6H CAIO Dia - CNP   10 mg at 21 0738    levETIRAcetam (KEPPRA) 100 MG/ML solution 1,000 mg  1,000 mg Oral BID CAIO Dia - CNP   1,000 mg at 21 0738    fentaNYL (SUBLIMAZE) injection 50 mcg  50 mcg IntraVENous Once Jammie Jacobs MD        chlorhexidine (PERIDEX) 0.12 % solution 15 mL  15 mL Mouth/Throat BID Beth [place], DO   15 mL at 21 0739    sodium chloride flush 0.9 % injection 5-40 mL  5-40 mL IntraVENous 2 times per day Beth [place], DO   10 mL at 21 0739    sodium chloride flush 0.9 % injection 5-40 mL  5-40 mL IntraVENous PRN Beth [place], DO        0.9 % sodium chloride infusion  25 mL IntraVENous PRN Beth Arnaud, DO        ondansetron (ZOFRAN-ODT) disintegrating tablet GI/Hepatic/Renal ROS            Endo/Other: Negative Endo/Other ROS                    Abdominal:             Vascular: negative vascular ROS. Other Findings:             Anesthesia Plan      general     ASA 4       Induction: intravenous. arterial line    Anesthetic plan and risks discussed with Unable to obtain due to emergent nature. Use of blood products discussed with whom consented to blood products. Plan discussed with CRNA.                   Abdirahman Padilla MD   8/26/2021

## 2021-08-26 NOTE — ANESTHESIA POSTPROCEDURE EVALUATION
Department of Anesthesiology  Postprocedure Note    Patient: Agus Ruiz  MRN: [id number]  [place]: 1985  Date of evaluation: 8/26/2021  Time:  4:31 PM     Procedure Summary     Date: 08/26/21 Room / Location: [facility] 12 / [address] [facility]    Anesthesia Start: 1227 Anesthesia Stop: 1500    Procedure: RIGHT FRONTAL CRANIOTOMY FOR SUBDURAL HEMATOMA EVACUATION (N/A Head) Diagnosis: (SUBDURAL HEMATOMA)    Surgeons: Emily Saba MD Responsible Provider: Alonso Lou MD    Anesthesia Type: general ASA Status: 4          Anesthesia Type: general    Patito Phase I:      Patito Phase II:      Last vitals: Reviewed and per EMR flowsheets.        Anesthesia Post Evaluation    Patient location during evaluation: ICU  Patient participation: complete - patient cannot participate  Level of consciousness: sedated and ventilated  Cardiovascular status: hemodynamically stable  Respiratory status: ventilator

## 2021-08-27 ENCOUNTER — APPOINTMENT (OUTPATIENT)
Dept: CT IMAGING | Age: 36
DRG: 003 | End: 2021-08-27
Payer: COMMERCIAL

## 2021-08-27 ENCOUNTER — APPOINTMENT (OUTPATIENT)
Dept: GENERAL RADIOLOGY | Age: 36
DRG: 003 | End: 2021-08-27
Payer: COMMERCIAL

## 2021-08-27 LAB
ABSOLUTE EOS #: 0 K/UL (ref 0–0.4)
ABSOLUTE EOS #: <0.03 K/UL (ref 0–0.44)
ABSOLUTE IMMATURE GRANULOCYTE: 0 K/UL (ref 0–0.3)
ABSOLUTE IMMATURE GRANULOCYTE: 0.06 K/UL (ref 0–0.3)
ABSOLUTE LYMPH #: 1.39 K/UL (ref 1.1–3.7)
ABSOLUTE LYMPH #: 2.21 K/UL (ref 1–4.8)
ABSOLUTE MONO #: 1.03 K/UL (ref 0.1–1.2)
ABSOLUTE MONO #: 1.29 K/UL (ref 0.1–0.8)
ALLEN TEST: ABNORMAL
ALLEN TEST: ABNORMAL
ANION GAP SERPL CALCULATED.3IONS-SCNC: 10 MMOL/L (ref 9–17)
ANION GAP SERPL CALCULATED.3IONS-SCNC: 7 MMOL/L (ref 9–17)
BASOPHILS # BLD: 0 % (ref 0–2)
BASOPHILS # BLD: 0 % (ref 0–2)
BASOPHILS ABSOLUTE: 0 K/UL (ref 0–0.2)
BASOPHILS ABSOLUTE: 0.06 K/UL (ref 0–0.2)
BUN BLDV-MCNC: 11 MG/DL (ref 6–20)
BUN BLDV-MCNC: 12 MG/DL (ref 6–20)
BUN/CREAT BLD: ABNORMAL (ref 9–20)
BUN/CREAT BLD: ABNORMAL (ref 9–20)
CALCIUM SERPL-MCNC: 7.7 MG/DL (ref 8.6–10.4)
CALCIUM SERPL-MCNC: 7.9 MG/DL (ref 8.6–10.4)
CHLORIDE BLD-SCNC: 113 MMOL/L (ref 98–107)
CHLORIDE BLD-SCNC: 115 MMOL/L (ref 98–107)
CO2: 22 MMOL/L (ref 20–31)
CO2: 23 MMOL/L (ref 20–31)
CREAT SERPL-MCNC: 0.35 MG/DL (ref 0.5–0.9)
CREAT SERPL-MCNC: 0.4 MG/DL (ref 0.5–0.9)
DIFFERENTIAL TYPE: ABNORMAL
DIFFERENTIAL TYPE: ABNORMAL
EOSINOPHILS RELATIVE PERCENT: 0 % (ref 1–4)
EOSINOPHILS RELATIVE PERCENT: 0 % (ref 1–4)
FIO2: 30
FIO2: 30
GFR AFRICAN AMERICAN: >60 ML/MIN
GFR AFRICAN AMERICAN: >60 ML/MIN
GFR NON-AFRICAN AMERICAN: >60 ML/MIN
GFR NON-AFRICAN AMERICAN: >60 ML/MIN
GFR SERPL CREATININE-BSD FRML MDRD: ABNORMAL ML/MIN/{1.73_M2}
GLUCOSE BLD-MCNC: 111 MG/DL (ref 70–99)
GLUCOSE BLD-MCNC: 123 MG/DL (ref 70–99)
GLUCOSE BLD-MCNC: 126 MG/DL (ref 74–100)
HCT VFR BLD CALC: 29 % (ref 36.3–47.1)
HCT VFR BLD CALC: 30.5 % (ref 36.3–47.1)
HEMOGLOBIN: 10 G/DL (ref 11.9–15.1)
HEMOGLOBIN: 9.2 G/DL (ref 11.9–15.1)
IMMATURE GRANULOCYTES: 0 %
IMMATURE GRANULOCYTES: 0 %
LYMPHOCYTES # BLD: 12 % (ref 24–44)
LYMPHOCYTES # BLD: 9 % (ref 24–43)
MAGNESIUM: 2.1 MG/DL (ref 1.6–2.6)
MCH RBC QN AUTO: 27.9 PG (ref 25.2–33.5)
MCH RBC QN AUTO: 28.6 PG (ref 25.2–33.5)
MCHC RBC AUTO-ENTMCNC: 31.7 G/DL (ref 28.4–34.8)
MCHC RBC AUTO-ENTMCNC: 32.8 G/DL (ref 28.4–34.8)
MCV RBC AUTO: 87.1 FL (ref 82.6–102.9)
MCV RBC AUTO: 87.9 FL (ref 82.6–102.9)
MODE: ABNORMAL
MODE: ABNORMAL
MONOCYTES # BLD: 6 % (ref 3–12)
MONOCYTES # BLD: 7 % (ref 1–7)
MORPHOLOGY: NORMAL
NEGATIVE BASE EXCESS, ART: ABNORMAL (ref 0–2)
NEGATIVE BASE EXCESS, ART: ABNORMAL (ref 0–2)
NRBC AUTOMATED: 0 PER 100 WBC
NRBC AUTOMATED: 0 PER 100 WBC
O2 DEVICE/FLOW/%: ABNORMAL
O2 DEVICE/FLOW/%: ABNORMAL
PATIENT TEMP: ABNORMAL
PATIENT TEMP: ABNORMAL
PDW BLD-RTO: 12.7 % (ref 11.8–14.4)
PDW BLD-RTO: 12.9 % (ref 11.8–14.4)
PHOSPHORUS: 2.4 MG/DL (ref 2.6–4.5)
PLATELET # BLD: 194 K/UL (ref 138–453)
PLATELET # BLD: 242 K/UL (ref 138–453)
PLATELET ESTIMATE: ABNORMAL
PLATELET ESTIMATE: ABNORMAL
PMV BLD AUTO: 9.3 FL (ref 8.1–13.5)
PMV BLD AUTO: 9.4 FL (ref 8.1–13.5)
POC HCO3: 21.5 MMOL/L (ref 21–28)
POC HCO3: 25 MMOL/L (ref 21–28)
POC LACTIC ACID: 0.4 MMOL/L (ref 0.56–1.39)
POC LACTIC ACID: 0.79 MMOL/L (ref 0.56–1.39)
POC O2 SATURATION: 100 % (ref 94–98)
POC O2 SATURATION: 99 % (ref 94–98)
POC PCO2 TEMP: ABNORMAL MM HG
POC PCO2 TEMP: ABNORMAL MM HG
POC PCO2: 25.6 MM HG (ref 35–48)
POC PCO2: 43.4 MM HG (ref 35–48)
POC PH TEMP: ABNORMAL
POC PH TEMP: ABNORMAL
POC PH: 7.37 (ref 7.35–7.45)
POC PH: 7.53 (ref 7.35–7.45)
POC PO2 TEMP: ABNORMAL MM HG
POC PO2 TEMP: ABNORMAL MM HG
POC PO2: 151.6 MM HG (ref 83–108)
POC PO2: 154.1 MM HG (ref 83–108)
POSITIVE BASE EXCESS, ART: 0 (ref 0–3)
POSITIVE BASE EXCESS, ART: 0 (ref 0–3)
POTASSIUM SERPL-SCNC: 3.6 MMOL/L (ref 3.7–5.3)
POTASSIUM SERPL-SCNC: 3.9 MMOL/L (ref 3.7–5.3)
RBC # BLD: 3.3 M/UL (ref 3.95–5.11)
RBC # BLD: 3.5 M/UL (ref 3.95–5.11)
RBC # BLD: ABNORMAL 10*6/UL
RBC # BLD: ABNORMAL 10*6/UL
SAMPLE SITE: ABNORMAL
SAMPLE SITE: ABNORMAL
SEG NEUTROPHILS: 81 % (ref 36–66)
SEG NEUTROPHILS: 85 % (ref 36–65)
SEGMENTED NEUTROPHILS ABSOLUTE COUNT: 13.83 K/UL (ref 1.5–8.1)
SEGMENTED NEUTROPHILS ABSOLUTE COUNT: 14.9 K/UL (ref 1.8–7.7)
SODIUM BLD-SCNC: 143 MMOL/L (ref 135–144)
SODIUM BLD-SCNC: 143 MMOL/L (ref 135–144)
SODIUM BLD-SCNC: 146 MMOL/L (ref 135–144)
SODIUM BLD-SCNC: 147 MMOL/L (ref 135–144)
SODIUM BLD-SCNC: 147 MMOL/L (ref 135–144)
TCO2 (CALC), ART: ABNORMAL MMOL/L (ref 22–29)
TCO2 (CALC), ART: ABNORMAL MMOL/L (ref 22–29)
WBC # BLD: 16.4 K/UL (ref 3.5–11.3)
WBC # BLD: 18.4 K/UL (ref 3.5–11.3)
WBC # BLD: ABNORMAL 10*3/UL
WBC # BLD: ABNORMAL 10*3/UL

## 2021-08-27 PROCEDURE — 84295 ASSAY OF SERUM SODIUM: CPT

## 2021-08-27 PROCEDURE — 6360000002 HC RX W HCPCS: Performed by: STUDENT IN AN ORGANIZED HEALTH CARE EDUCATION/TRAINING PROGRAM

## 2021-08-27 PROCEDURE — 84100 ASSAY OF PHOSPHORUS: CPT

## 2021-08-27 PROCEDURE — 83605 ASSAY OF LACTIC ACID: CPT

## 2021-08-27 PROCEDURE — 80048 BASIC METABOLIC PNL TOTAL CA: CPT

## 2021-08-27 PROCEDURE — 6370000000 HC RX 637 (ALT 250 FOR IP): Performed by: STUDENT IN AN ORGANIZED HEALTH CARE EDUCATION/TRAINING PROGRAM

## 2021-08-27 PROCEDURE — 2700000000 HC OXYGEN THERAPY PER DAY

## 2021-08-27 PROCEDURE — 2580000003 HC RX 258: Performed by: NEUROLOGICAL SURGERY

## 2021-08-27 PROCEDURE — 6360000002 HC RX W HCPCS: Performed by: NURSE PRACTITIONER

## 2021-08-27 PROCEDURE — 61107 TDH PNXR IMPLT VENTR CATH: CPT | Performed by: NEUROLOGICAL SURGERY

## 2021-08-27 PROCEDURE — 95714 VEEG EA 12-26 HR UNMNTR: CPT

## 2021-08-27 PROCEDURE — 2500000003 HC RX 250 WO HCPCS: Performed by: STUDENT IN AN ORGANIZED HEALTH CARE EDUCATION/TRAINING PROGRAM

## 2021-08-27 PROCEDURE — 82803 BLOOD GASES ANY COMBINATION: CPT

## 2021-08-27 PROCEDURE — 71045 X-RAY EXAM CHEST 1 VIEW: CPT

## 2021-08-27 PROCEDURE — 94003 VENT MGMT INPAT SUBQ DAY: CPT

## 2021-08-27 PROCEDURE — 94761 N-INVAS EAR/PLS OXIMETRY MLT: CPT

## 2021-08-27 PROCEDURE — 99291 CRITICAL CARE FIRST HOUR: CPT | Performed by: NEUROLOGICAL SURGERY

## 2021-08-27 PROCEDURE — 009630Z DRAINAGE OF CEREBRAL VENTRICLE WITH DRAINAGE DEVICE, PERCUTANEOUS APPROACH: ICD-10-PCS | Performed by: NEUROLOGICAL SURGERY

## 2021-08-27 PROCEDURE — 70450 CT HEAD/BRAIN W/O DYE: CPT

## 2021-08-27 PROCEDURE — 95822 EEG COMA OR SLEEP ONLY: CPT

## 2021-08-27 PROCEDURE — 82947 ASSAY GLUCOSE BLOOD QUANT: CPT

## 2021-08-27 PROCEDURE — 6360000002 HC RX W HCPCS: Performed by: NEUROLOGICAL SURGERY

## 2021-08-27 PROCEDURE — 83735 ASSAY OF MAGNESIUM: CPT

## 2021-08-27 PROCEDURE — 95700 EEG CONT REC W/VID EEG TECH: CPT

## 2021-08-27 PROCEDURE — 2580000003 HC RX 258: Performed by: NURSE PRACTITIONER

## 2021-08-27 PROCEDURE — 2000000000 HC ICU R&B

## 2021-08-27 PROCEDURE — 06HY33Z INSERTION OF INFUSION DEVICE INTO LOWER VEIN, PERCUTANEOUS APPROACH: ICD-10-PCS | Performed by: SURGERY

## 2021-08-27 PROCEDURE — 37799 UNLISTED PX VASCULAR SURGERY: CPT

## 2021-08-27 PROCEDURE — APPSS60 APP SPLIT SHARED TIME 46-60 MINUTES: Performed by: REGISTERED NURSE

## 2021-08-27 PROCEDURE — 2580000003 HC RX 258: Performed by: STUDENT IN AN ORGANIZED HEALTH CARE EDUCATION/TRAINING PROGRAM

## 2021-08-27 PROCEDURE — 85025 COMPLETE CBC W/AUTO DIFF WBC: CPT

## 2021-08-27 RX ORDER — 3% SODIUM CHLORIDE 3 G/100ML
30 INJECTION, SOLUTION INTRAVENOUS CONTINUOUS
Status: DISPENSED | OUTPATIENT
Start: 2021-08-27 | End: 2021-08-28

## 2021-08-27 RX ORDER — MANNITOL 20 G/100ML
50 INJECTION, SOLUTION INTRAVENOUS ONCE
Status: COMPLETED | OUTPATIENT
Start: 2021-08-27 | End: 2021-08-29

## 2021-08-27 RX ORDER — 3% SODIUM CHLORIDE 3 G/100ML
250 INJECTION, SOLUTION INTRAVENOUS ONCE
Status: COMPLETED | OUTPATIENT
Start: 2021-08-27 | End: 2021-08-27

## 2021-08-27 RX ADMIN — SODIUM CHLORIDE, PRESERVATIVE FREE 10 ML: 5 INJECTION INTRAVENOUS at 20:49

## 2021-08-27 RX ADMIN — Medication 125 MCG/HR: at 11:52

## 2021-08-27 RX ADMIN — GABAPENTIN 300 MG: 300 CAPSULE ORAL at 08:03

## 2021-08-27 RX ADMIN — POTASSIUM PHOSPHATE, MONOBASIC AND POTASSIUM PHOSPHATE, DIBASIC 20 MMOL: 224; 236 INJECTION, SOLUTION, CONCENTRATE INTRAVENOUS at 10:41

## 2021-08-27 RX ADMIN — ACETAMINOPHEN 1000 MG: 500 TABLET ORAL at 20:48

## 2021-08-27 RX ADMIN — METHOCARBAMOL TABLETS 750 MG: 750 TABLET, COATED ORAL at 20:45

## 2021-08-27 RX ADMIN — METOCLOPRAMIDE 10 MG: 5 INJECTION, SOLUTION INTRAMUSCULAR; INTRAVENOUS at 20:44

## 2021-08-27 RX ADMIN — Medication 125 MCG/HR: at 20:09

## 2021-08-27 RX ADMIN — PROPOFOL 50 MCG/KG/MIN: 10 INJECTION, EMULSION INTRAVENOUS at 13:47

## 2021-08-27 RX ADMIN — GABAPENTIN 300 MG: 300 CAPSULE ORAL at 00:15

## 2021-08-27 RX ADMIN — SODIUM CHLORIDE 250 ML: 3 INJECTION, SOLUTION INTRAVENOUS at 03:44

## 2021-08-27 RX ADMIN — Medication 125 MCG/HR: at 04:15

## 2021-08-27 RX ADMIN — METHOCARBAMOL TABLETS 750 MG: 750 TABLET, COATED ORAL at 08:03

## 2021-08-27 RX ADMIN — PROPOFOL 50 MCG/KG/MIN: 10 INJECTION, EMULSION INTRAVENOUS at 23:04

## 2021-08-27 RX ADMIN — SODIUM CHLORIDE, PRESERVATIVE FREE 10 ML: 5 INJECTION INTRAVENOUS at 08:03

## 2021-08-27 RX ADMIN — POLYETHYLENE GLYCOL 3350 17 G: 17 POWDER, FOR SOLUTION ORAL at 08:03

## 2021-08-27 RX ADMIN — FAMOTIDINE 20 MG: 20 TABLET, FILM COATED ORAL at 08:03

## 2021-08-27 RX ADMIN — SODIUM CHLORIDE 250 ML: 3 INJECTION, SOLUTION INTRAVENOUS at 11:29

## 2021-08-27 RX ADMIN — FAMOTIDINE 20 MG: 20 TABLET, FILM COATED ORAL at 20:45

## 2021-08-27 RX ADMIN — LEVETIRACETAM 1000 MG: 500 SOLUTION ORAL at 20:49

## 2021-08-27 RX ADMIN — MAGNESIUM SULFATE 3000 MG: 500 INJECTION, SOLUTION INTRAMUSCULAR; INTRAVENOUS at 12:05

## 2021-08-27 RX ADMIN — METOCLOPRAMIDE 10 MG: 5 INJECTION, SOLUTION INTRAMUSCULAR; INTRAVENOUS at 08:03

## 2021-08-27 RX ADMIN — CHLORHEXIDINE GLUCONATE 0.12% ORAL RINSE 15 ML: 1.2 LIQUID ORAL at 10:41

## 2021-08-27 RX ADMIN — ACETAMINOPHEN 1000 MG: 500 TABLET ORAL at 05:44

## 2021-08-27 RX ADMIN — LEVETIRACETAM 1000 MG: 500 SOLUTION ORAL at 08:03

## 2021-08-27 RX ADMIN — CHLORHEXIDINE GLUCONATE 0.12% ORAL RINSE 15 ML: 1.2 LIQUID ORAL at 20:49

## 2021-08-27 RX ADMIN — OXYCODONE HYDROCHLORIDE 5 MG: 5 TABLET ORAL at 02:59

## 2021-08-27 RX ADMIN — PROPOFOL 50 MCG/KG/MIN: 10 INJECTION, EMULSION INTRAVENOUS at 18:46

## 2021-08-27 RX ADMIN — METHOCARBAMOL TABLETS 750 MG: 750 TABLET, COATED ORAL at 04:25

## 2021-08-27 RX ADMIN — Medication 2 MCG/MIN: at 00:00

## 2021-08-27 RX ADMIN — BISACODYL 10 MG: 10 SUPPOSITORY RECTAL at 08:03

## 2021-08-27 RX ADMIN — SODIUM CHLORIDE 30 ML/HR: 3 INJECTION, SOLUTION INTRAVENOUS at 11:18

## 2021-08-27 RX ADMIN — CEFAZOLIN 2000 MG: 10 INJECTION, POWDER, FOR SOLUTION INTRAVENOUS at 17:11

## 2021-08-27 RX ADMIN — POTASSIUM BICARBONATE 40 MEQ: 391 TABLET, EFFERVESCENT ORAL at 09:50

## 2021-08-27 RX ADMIN — METOCLOPRAMIDE 10 MG: 5 INJECTION, SOLUTION INTRAMUSCULAR; INTRAVENOUS at 02:20

## 2021-08-27 ASSESSMENT — PULMONARY FUNCTION TESTS
PIF_VALUE: 26
PIF_VALUE: 20
PIF_VALUE: 18
PIF_VALUE: 20
PIF_VALUE: 21
PIF_VALUE: 20
PIF_VALUE: 24

## 2021-08-27 NOTE — PROGRESS NOTES
Physical Therapy        Physical Therapy Cancel Note      DATE: 2021    NAME: Dayan Martinez  MRN: [id number]   : 1985      Patient not seen this date for Physical Therapy due to: Other: Intubated/Sedated.  Not medically appropriate       Electronically signed by Adam Grant PT on 2021 at 8:59 AM
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
## 2021-08-27 NOTE — PLAN OF CARE
Nutrition Problem #1: Inadequate oral intake  Intervention: Food and/or Nutrient Delivery: Continue NPO, Modify Tube Feeding  Nutritional Goals: EN intake to meet >75% of estimated nutrition needs

## 2021-08-27 NOTE — PROCEDURES
PROCEDURE NOTE - CENTRAL VENOUS LINE PLACEMENT    PATIENT NAME: Dillon Booker RECORD NO. [id number]  DATE: 8/27/2021  SURGEON: Dr. Savanna Thomas: No primary care provider on file. PREOPERATIVE DIAGNOSIS:  Need for IV access  POSTOPERATIVE DIAGNOSIS:  Same  PROCEDURE PERFORMED:  Left Femoral Vein Central Line Insertion  SURGEON: Juanita Tijerina MD  ANESTHESIA:  None  ESTIMATED BLOOD LOSS:  Less than 25 ml  COMPLICATIONS:  None immediately appreciated. OPERATIVE NOTE PREPARED BY: Juanita Tijerina MD  TIME OF PROCEDURE: 1:02 AM     DISCUSSION:  Annette Bourne is a 38y.o.-year-old female who requires additional IV access and central pressure monitoring. The history and physical examination were reviewed and confirmed. Consent was implied due to patient's status, intubated. The patient was then prepared for the procedure. PROCEDURE:  A timeout was initiated by the bedside nurse and was confirmed by those present. The patient was placed in a supine position. The skin overlying the Left Femoral Vein was prepped with chlorhexadine and draped in sterile fashion. The introducer needle was inserted into the femoral vein returning dark red non pulsatile blood. A guidewire was placed through the center of the needle with no resistance. A small incision made in the skin with a #11 scalpel blade. The dilator was inserted into the skin and vein over guidewire using Seldinger technique. The dilator was then removed and the catheter was placed in the vein over the guidewire using Seldinger technique. The guidewire was then removed and all ports aspirated and flushed appropriately. The catheter then secured using silk suture and a temporary sterile dressing was applied. No immediate complication was evident. All sponge, instrument and needle counts were correct at the completion of the procedure. The patient tolerated the procedure well with no immediate complication evidentClayton Patel

## 2021-08-27 NOTE — PLAN OF CARE
Problem: OXYGENATION/RESPIRATORY FUNCTION  Goal: Patient will maintain patent airway  8/27/2021 0929 by Nai Rivas RCP  Outcome: Ongoing  8/27/2021 0734 by Kimmy Morrell RN  Outcome: Ongoing  8/26/2021 1950 by Kevan Salas RCP  Outcome: Ongoing     Problem: OXYGENATION/RESPIRATORY FUNCTION  Goal: Patient will achieve/maintain normal respiratory rate/effort  Description: Respiratory rate and effort will be within normal limits for the patient  8/27/2021 0929 by Nai Rivas RCP  Outcome: Ongoing  8/27/2021 0734 by Kimmy Morrell RN  Outcome: Ongoing  8/26/2021 1950 by Kevan Salas RCP  Outcome: Ongoing     Problem: MECHANICAL VENTILATION  Goal: Patient will maintain patent airway  8/27/2021 0929 by Nai Rivas RCP  Outcome: Ongoing  8/27/2021 0734 by Kimmy Morrell RN  Outcome: Ongoing  8/26/2021 1950 by Kevan Salas RCP  Outcome: Ongoing     Problem: MECHANICAL VENTILATION  Goal: Oral health is maintained or improved  8/27/2021 0929 by Nai Rivas RCP  Outcome: Ongoing  8/27/2021 0734 by Kimmy Morrell RN  Outcome: Ongoing  8/26/2021 1950 by Kevan Salas RCP  Outcome: Ongoing     Problem: MECHANICAL VENTILATION  Goal: ET tube will be managed safely  8/27/2021 0929 by Nai Rivas RCP  Outcome: Ongoing  8/27/2021 0734 by Kimmy Morrell RN  Outcome: Ongoing  8/26/2021 1950 by Kevan Salas RCP  Outcome: Ongoing     Problem: MECHANICAL VENTILATION  Goal: Ability to express needs and understand communication  8/27/2021 0929 by Nai Rivas RCP  Outcome: Ongoing  8/27/2021 0734 by Kimmy Morrell RN  Outcome: Ongoing  8/26/2021 1950 by Kevan Salas RCP  Outcome: Ongoing     Problem: MECHANICAL VENTILATION  Goal: Mobility/activity is maintained at optimum level for patient  8/27/2021 9747 by Nai Rivas RCP  Outcome: Ongoing  8/27/2021 0734 by Kimmy Morrell RN  Outcome: Ongoing  8/26/2021 1950 by Kevan Salas RCP  Outcome: Ongoing     Problem: SKIN INTEGRITY  Goal: Skin integrity is maintained or

## 2021-08-27 NOTE — CARE COORDINATION
Case Management Initial Discharge Plan  Bj Frazier,             Met with:family member mother to discuss discharge plans. Information verified: address, contacts, phone number, , insurance Yes  Insurance Provider: [facility]    Emergency Contact/Next of Frandy Duggan name & number:   Nikolai Reis mother, [phone]    Who are involved in patient's support system? Parents, sister    PCP: No primary care provider on file. Date of last visit:       Discharge Planning    Living Arrangements:  Parent (mother)     Patient able to perform ADL's:Independent    Current Services (outpatient & in home) none  DME equipment: none  DME provider:     Is patient receiving oral anticoagulation therapy? No    If indicated:   Physician managing anticoagulation treatment:   Where does patient obtain lab work for ATC treatment? Potential Assistance Needed:  [facility]    Patient agreeable to home care: No  [place] of choice provided:  n/a    Prior SNF/Rehab Placement and Facility: none  Agreeable to SNF/Rehab: Yes  [place] of choice provided: yes     Evaluation: n/a    Expected Discharge date:       Patient expects to be discharged to: If home: is the family and/or caregiver wiling & able to provide support at home? yes  Who will be providing this support? mother    Follow Up Appointment: Best Day/ Time:      Transportation provider: mom  Transportation arrangements needed for discharge: No    Readmission Risk              Risk of Unplanned Readmission:  13             Does patient have a readmission risk score greater than 14?: No  If yes, follow-up appointment must be made within 7 days of discharge.      Goals of Care:       Educated patients mother on transitional options, provided freedom of choice and are agreeable with plan      Discharge Plan: int/sed- fio2 30%, on pressors and LTME, may need SNF/LTACH pending progression- family would prefer [facility] if possible. [phone] called Myrtle Winter with [facility] to see if he had any contact information for [employer]. He stated he was going to look and get back with me. 80- received call from Myrtle Winter with [facility]- he stated that they do not have any LTACHs near [place], [place].       Electronically signed by Nuno Delgado RN on 8/27/21 at 4:08 PM EDT
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
## 2021-08-27 NOTE — PLAN OF CARE
Problem: OXYGENATION/RESPIRATORY FUNCTION  Goal: Patient will maintain patent airway  8/27/2021 1601 by Kaylan Mejia RN  Outcome: Ongoing  8/27/2021 0929 by Felicia Mason, AMMYP  Outcome: Ongoing  8/27/2021 0734 by Rony Gutiérrez RN  Outcome: Ongoing  Goal: Patient will achieve/maintain normal respiratory rate/effort  Description: Respiratory rate and effort will be within normal limits for the patient  8/27/2021 1601 by Kaylan Mejia RN  Outcome: Ongoing  8/27/2021 0929 by Felicia Mason, RCP  Outcome: Ongoing  8/27/2021 0734 by Rony Gutiérrez RN  Outcome: Ongoing     Problem: MECHANICAL VENTILATION  Goal: Patient will maintain patent airway  8/27/2021 1601 by Kaylan Mejia RN  Outcome: Ongoing  8/27/2021 0929 by Felicia Mason, AMMYP  Outcome: Ongoing  8/27/2021 0734 by Rony Gutiérrez RN  Outcome: Ongoing  Goal: Oral health is maintained or improved  8/27/2021 1601 by Kaylan Mejia RN  Outcome: Ongoing  8/27/2021 0929 by Felicia Mason, AMMYP  Outcome: Ongoing  8/27/2021 0734 by Rony Gutiérrez RN  Outcome: Ongoing  Goal: ET tube will be managed safely  8/27/2021 1601 by Kaylan Mejia RN  Outcome: Ongoing  8/27/2021 0929 by Felicia Mason, AMMYP  Outcome: Ongoing  8/27/2021 0734 by Rony Gutiérrez RN  Outcome: Ongoing  Goal: Ability to express needs and understand communication  8/27/2021 1601 by Kaylan Mejia RN  Outcome: Ongoing  8/27/2021 0929 by Felicia Mason, AMMYP  Outcome: Ongoing  8/27/2021 0734 by Rony Gutiérrez RN  Outcome: Ongoing  Goal: Mobility/activity is maintained at optimum level for patient  8/27/2021 1601 by Kaylan Mejia RN  Outcome: Ongoing  8/27/2021 0929 by Felicia Mason, AMMYP  Outcome: Ongoing  8/27/2021 0734 by Rony Gutiérrez RN  Outcome: Ongoing     Problem: SKIN INTEGRITY  Goal: Skin integrity is maintained or improved  8/27/2021 1601 by Kaylan Mejia RN  Outcome: Ongoing  8/27/2021 0929 by Felicia Mason RCP  Outcome: Ongoing  8/27/2021 0734 by Rony Gutiérrez RN  Outcome: Ongoing     Problem: Confusion - Acute:  Goal: Absence of continued neurological deterioration signs and symptoms  Description: Absence of continued neurological deterioration signs and symptoms  8/27/2021 1601 by Nabeel Donald RN  Outcome: Ongoing  8/27/2021 0734 by Royal Shrestha RN  Outcome: Ongoing  Goal: Mental status will be restored to baseline  Description: Mental status will be restored to baseline  8/27/2021 1601 by Nabeel Donald RN  Outcome: Ongoing  8/27/2021 0734 by Royal Shrestha RN  Outcome: Ongoing     Problem: Discharge Planning:  Goal: Ability to perform activities of daily living will improve  Description: Ability to perform activities of daily living will improve  8/27/2021 1601 by Nabeel Donald RN  Outcome: Ongoing  8/27/2021 0734 by Royal Shrestha RN  Outcome: Ongoing  Goal: Participates in care planning  Description: Participates in care planning  8/27/2021 1601 by Nabeel Donald RN  Outcome: Ongoing  8/27/2021 0734 by Royal Shrestha RN  Outcome: Ongoing     Problem: Injury - Risk of, Physical Injury:  Goal: Absence of physical injury  Description: Absence of physical injury  8/27/2021 1601 by Nabeel Donald RN  Outcome: Ongoing  8/27/2021 0734 by Royal Shrestha RN  Outcome: Ongoing  Goal: Will remain free from falls  Description: Will remain free from falls  8/27/2021 1601 by Nabeel Donald RN  Outcome: Ongoing  8/27/2021 0734 by Royal Shrestha RN  Outcome: Ongoing     Problem: Mood - Altered:  Goal: Mood stable  Description: Mood stable  8/27/2021 1601 by Nabeel Donald RN  Outcome: Ongoing  8/27/2021 0734 by Royal Shrestha RN  Outcome: Ongoing  Goal: Absence of abusive behavior  Description: Absence of abusive behavior  8/27/2021 1601 by Nabeel Donald RN  Outcome: Ongoing  8/27/2021 0734 by Royal Shrestha RN  Outcome: Ongoing  Goal: Verbalizations of feeling emotionally comfortable while being cared for will increase  Description: Verbalizations of feeling emotionally comfortable while being cared for will increase  8/27/2021 1601 by Trish Mendez RN  Outcome: Ongoing  8/27/2021 0734 by Alonzo Homans, RN  Outcome: Ongoing     Problem: Psychomotor Activity - Altered:  Goal: Absence of psychomotor disturbance signs and symptoms  Description: Absence of psychomotor disturbance signs and symptoms  8/27/2021 1601 by Trish Mendez RN  Outcome: Ongoing  8/27/2021 0734 by Alonzo Homans, RN  Outcome: Ongoing     Problem: Sensory Perception - Impaired:  Goal: Demonstrations of improved sensory functioning will increase  Description: Demonstrations of improved sensory functioning will increase  8/27/2021 1601 by Trish Mendez RN  Outcome: Ongoing  8/27/2021 0734 by Alonzo Homans, RN  Outcome: Ongoing  Goal: Decrease in sensory misperception frequency  Description: Decrease in sensory misperception frequency  8/27/2021 1601 by Trish Mendez RN  Outcome: Ongoing  8/27/2021 0734 by Alonzo Homans, RN  Outcome: Ongoing  Goal: Able to refrain from responding to false sensory perceptions  Description: Able to refrain from responding to false sensory perceptions  8/27/2021 1601 by Trish Mendez RN  Outcome: Ongoing  8/27/2021 0734 by Alonzo Homans, RN  Outcome: Ongoing  Goal: Demonstrates accurate environmental perceptions  Description: Demonstrates accurate environmental perceptions  8/27/2021 1601 by Trish Mendez RN  Outcome: Ongoing  8/27/2021 0734 by Alonzo Homans, RN  Outcome: Ongoing  Goal: Able to distinguish between reality-based and nonreality-based thinking  Description: Able to distinguish between reality-based and nonreality-based thinking  8/27/2021 1601 by rTish Mendez RN  Outcome: Ongoing  8/27/2021 0734 by Alonzo Homans, RN  Outcome: Ongoing  Goal: Able to interrupt nonreality-based thinking  Description: Able to interrupt nonreality-based thinking  8/27/2021 1601 by Trish Mendez RN  Outcome: Ongoing  8/27/2021 0734 by Annette Armenta Denver Fulling, RN  Outcome: Ongoing     Problem: Sleep Pattern Disturbance:  Goal: Appears well-rested  Description: Appears well-rested  8/27/2021 1601 by Franny Hyde RN  Outcome: Ongoing  8/27/2021 0734 by David Li RN  Outcome: Ongoing     Problem: Skin Integrity:  Goal: Will show no infection signs and symptoms  Description: Will show no infection signs and symptoms  8/27/2021 1601 by Franny Hyde RN  Outcome: Ongoing  8/27/2021 0734 by David Li RN  Outcome: Ongoing  Goal: Absence of new skin breakdown  Description: Absence of new skin breakdown  8/27/2021 1601 by Franny Hyde RN  Outcome: Ongoing  8/27/2021 0734 by David Li RN  Outcome: Ongoing     Problem: Falls - Risk of:  Goal: Absence of physical injury  Description: Absence of physical injury  8/27/2021 1601 by Franny Hyde RN  Outcome: Ongoing  8/27/2021 0734 by David Li RN  Outcome: Ongoing  Goal: Will remain free from falls  Description: Will remain free from falls  8/27/2021 1601 by Franny Hyde RN  Outcome: Ongoing  8/27/2021 0734 by David Li RN  Outcome: Ongoing     Problem: Non-Violent Restraints  Goal: Removal from restraints as soon as assessed to be safe  8/27/2021 1601 by Franny Hyde RN  Outcome: Ongoing  8/27/2021 0734 by David Li RN  Outcome: Ongoing  Goal: No harm/injury to patient while restraints in use  8/27/2021 1601 by Franny Hyde RN  Outcome: Ongoing  8/27/2021 0734 by David Li RN  Outcome: Ongoing  Goal: Patient's dignity will be maintained  8/27/2021 1601 by Franny Hyde RN  Outcome: Ongoing  8/27/2021 0734 by David Li RN  Outcome: Ongoing     Problem: Nutrition  Goal: Optimal nutrition therapy  8/27/2021 1601 by Franny Hyde RN  Outcome: Ongoing  8/27/2021 1203 by Everett Rutherford, JAYME, LD  Note: Nutrition Problem #1: Inadequate oral intake  Intervention: Food and/or Nutrient Delivery: Continue NPO, Modify Tube Feeding  Nutritional Goals: EN intake to meet >75% of estimated nutrition needs    8/27/2021 0734 by Rony Gutiérrez, RN  Outcome: Ongoing

## 2021-08-27 NOTE — PROGRESS NOTES
[facility])  Occupational Therapy Not Seen Note    DATE: 2021    NAME: Deya Mancia  MRN: [id number]   : 1985      Patient not seen this date for Occupational Therapy due to:    Patient is not appropriate for active participation in OT evaluation/treatment at this time d/t intubated, sedated    Next Scheduled Treatment: 21    Electronically signed by Jenny Molina OT on 2021 at 9:32 AM
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
## 2021-08-27 NOTE — PROGRESS NOTES
Routine EEG completed bedside and converting to LTME.  Patient hooked with MRI/CT compatible electrodes
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
## 2021-08-27 NOTE — PLAN OF CARE
Problem: OXYGENATION/RESPIRATORY FUNCTION  Goal: Patient will maintain patent airway  8/27/2021 0734 by Demario Babin RN  Outcome: Ongoing  8/26/2021 1950 by Derick Mims RCP  Outcome: Ongoing  Goal: Patient will achieve/maintain normal respiratory rate/effort  Description: Respiratory rate and effort will be within normal limits for the patient  8/27/2021 0734 by Demario Babin RN  Outcome: Ongoing  8/26/2021 1950 by Derick Mims RCP  Outcome: Ongoing     Problem: MECHANICAL VENTILATION  Goal: Patient will maintain patent airway  8/27/2021 0734 by Demario Babin RN  Outcome: Ongoing  8/26/2021 1950 by Derick Mims RCP  Outcome: Ongoing  Goal: Oral health is maintained or improved  8/27/2021 0734 by Demario Babin RN  Outcome: Ongoing  8/26/2021 1950 by Derick Mims RCP  Outcome: Ongoing  Goal: ET tube will be managed safely  8/27/2021 0734 by Demario Babin RN  Outcome: Ongoing  8/26/2021 1950 by Derick Mims RCP  Outcome: Ongoing  Goal: Ability to express needs and understand communication  8/27/2021 [phone] by Demario Babin RN  Outcome: Ongoing  8/26/2021 1950 by Derick Mims RCP  Outcome: Ongoing  Goal: Mobility/activity is maintained at optimum level for patient  8/27/2021 0734 by Demario Babin RN  Outcome: Ongoing  8/26/2021 1950 by Derick Mims RCP  Outcome: Ongoing     Problem: SKIN INTEGRITY  Goal: Skin integrity is maintained or improved  8/27/2021 0734 by Demario Babin RN  Outcome: Ongoing  8/26/2021 1950 by Derick Mims RCP  Outcome: Ongoing     Problem: Discharge Planning:  Goal: Ability to perform activities of daily living will improve  Description: Ability to perform activities of daily living will improve  Outcome: Ongoing  Goal: Participates in care planning  Description: Participates in care planning  Outcome: Ongoing     Problem: Injury - Risk of, Physical Injury:  Goal: Absence of physical injury  Description: Absence of physical injury  Outcome: Ongoing  Goal: Will remain free from falls  Description: Will remain free from falls  Outcome: Ongoing     Problem: Mood - Altered:  Goal: Mood stable  Description: Mood stable  Outcome: Ongoing  Goal: Absence of abusive behavior  Description: Absence of abusive behavior  Outcome: Ongoing  Goal: Verbalizations of feeling emotionally comfortable while being cared for will increase  Description: Verbalizations of feeling emotionally comfortable while being cared for will increase  Outcome: Ongoing     Problem: Psychomotor Activity - Altered:  Goal: Absence of psychomotor disturbance signs and symptoms  Description: Absence of psychomotor disturbance signs and symptoms  Outcome: Ongoing     Problem: Sensory Perception - Impaired:  Goal: Demonstrations of improved sensory functioning will increase  Description: Demonstrations of improved sensory functioning will increase  Outcome: Ongoing  Goal: Decrease in sensory misperception frequency  Description: Decrease in sensory misperception frequency  Outcome: Ongoing  Goal: Able to refrain from responding to false sensory perceptions  Description: Able to refrain from responding to false sensory perceptions  Outcome: Ongoing  Goal: Demonstrates accurate environmental perceptions  Description: Demonstrates accurate environmental perceptions  Outcome: Ongoing  Goal: Able to distinguish between reality-based and nonreality-based thinking  Description: Able to distinguish between reality-based and nonreality-based thinking  Outcome: Ongoing  Goal: Able to interrupt nonreality-based thinking  Description: Able to interrupt nonreality-based thinking  Outcome: Ongoing     Problem: Sleep Pattern Disturbance:  Goal: Appears well-rested  Description: Appears well-rested  Outcome: Ongoing     Problem: Skin Integrity:  Goal: Will show no infection signs and symptoms  Description: Will show no infection signs and symptoms  Outcome: Ongoing  Goal: Absence of new skin breakdown  Description: Absence of new skin breakdown  Outcome: Ongoing     Problem: Falls - Risk of:  Goal: Absence of physical injury  Description: Absence of physical injury  Outcome: Ongoing  Goal: Will remain free from falls  Description: Will remain free from falls  Outcome: Ongoing     Problem: Non-Violent Restraints  Goal: Removal from restraints as soon as assessed to be safe  Outcome: Ongoing  Goal: No harm/injury to patient while restraints in use  Outcome: Ongoing  Goal: Patient's dignity will be maintained  Outcome: Ongoing     Problem: Nutrition  Goal: Optimal nutrition therapy  Outcome: Ongoing

## 2021-08-27 NOTE — FLOWSHEET NOTE
Patient's mother updated on patient case over the phone. All questions answered at this time.     Electronically signed by Kevin Grijalva RN on 8/27/2021 at 2:03 PM

## 2021-08-27 NOTE — PROCEDURES
Neurosurgery Procedure note: Emergency External Ventricular drain    Surgeon: Lai Barragan NP, Rafa Norton DO    Diagnosis: intracranial hypertension, s/p craniotomy     Brief history and indications: This is a 69-year-old woman who presented with TBI with SDH. She is POD 1 s/p craniotomy but has refractory elevated ICP intermittently above 30s. .  A consent was obtained from family. I explained to the family the indications, the expected outcomes, the alternatives which include medical management. The risk includes  bleeding, pain, infection, catheter malfunction, brain damage, brain bleed, need for surgery, anesthesia and medical complications, death. All their questions were answered and they asked me to proceed with the procedure. Details:   Pre-procedure antibiotic was given. The left-sided head was shaved, scrubbed with alcohol, and prepped with Betadine. This area was sterilely draped. A timeout was called and agreed by the team.  1% lidocaine with epinephrine was infiltrated along the Kocher's point, and a 1 inch parasagittal incision was made with a 15 blade down to the pericranium. A subperiosteal dissection was made and a Heiss self-retaining retractor was placed. Then a manual twist drill was used to perforate the skull at the Kocher's point. The dura was incised with 11 blade. And an antibiotic impregnated ventricular catheter was used to cannulate the left frontal horn  at the depth of 7 cm at bone. blood tinged CSF was obtained with a opening pressure of approximately <10cm of water. The catheter was then tunneled 5 cm away after the skin and connected to a collection chamber. The wound was closed with running 3-0 nylon. Exit catheter was purse-stringed to the scalp and secured with multiple staples. Wound was dressed and the patient tolerated the procedure well.         Opening pressure: <10cm H20

## 2021-08-28 ENCOUNTER — APPOINTMENT (OUTPATIENT)
Dept: GENERAL RADIOLOGY | Age: 36
DRG: 003 | End: 2021-08-28
Payer: COMMERCIAL

## 2021-08-28 LAB
-: ABNORMAL
ABSOLUTE EOS #: 0.25 K/UL (ref 0–0.44)
ABSOLUTE IMMATURE GRANULOCYTE: 0.06 K/UL (ref 0–0.3)
ABSOLUTE LYMPH #: 2.15 K/UL (ref 1.1–3.7)
ABSOLUTE MONO #: 1.12 K/UL (ref 0.1–1.2)
ALLEN TEST: ABNORMAL
AMORPHOUS: ABNORMAL
ANION GAP SERPL CALCULATED.3IONS-SCNC: 7 MMOL/L (ref 9–17)
BACTERIA: ABNORMAL
BASOPHILS # BLD: 1 % (ref 0–2)
BASOPHILS ABSOLUTE: 0.07 K/UL (ref 0–0.2)
BILIRUBIN URINE: NEGATIVE
BUN BLDV-MCNC: 16 MG/DL (ref 6–20)
BUN/CREAT BLD: ABNORMAL (ref 9–20)
CALCIUM SERPL-MCNC: 7.7 MG/DL (ref 8.6–10.4)
CASTS UA: ABNORMAL /LPF (ref 0–2)
CHLORIDE BLD-SCNC: 114 MMOL/L (ref 98–107)
CO2: 22 MMOL/L (ref 20–31)
COLOR: YELLOW
COMMENT UA: ABNORMAL
CREAT SERPL-MCNC: 0.31 MG/DL (ref 0.5–0.9)
CRYSTALS, UA: ABNORMAL /HPF
DIFFERENTIAL TYPE: ABNORMAL
EOSINOPHILS RELATIVE PERCENT: 2 % (ref 1–4)
EPITHELIAL CELLS UA: ABNORMAL /HPF (ref 0–5)
FIO2: ABNORMAL
GFR AFRICAN AMERICAN: >60 ML/MIN
GFR NON-AFRICAN AMERICAN: >60 ML/MIN
GFR SERPL CREATININE-BSD FRML MDRD: ABNORMAL ML/MIN/{1.73_M2}
GFR SERPL CREATININE-BSD FRML MDRD: ABNORMAL ML/MIN/{1.73_M2}
GLUCOSE BLD-MCNC: 110 MG/DL (ref 65–105)
GLUCOSE BLD-MCNC: 119 MG/DL (ref 65–105)
GLUCOSE BLD-MCNC: 122 MG/DL (ref 74–100)
GLUCOSE BLD-MCNC: 99 MG/DL (ref 70–99)
GLUCOSE URINE: NEGATIVE
HCT VFR BLD CALC: 29.2 % (ref 36.3–47.1)
HEMOGLOBIN: 9.6 G/DL (ref 11.9–15.1)
IMMATURE GRANULOCYTES: 0 %
KETONES, URINE: ABNORMAL
LEUKOCYTE ESTERASE, URINE: ABNORMAL
LYMPHOCYTES # BLD: 15 % (ref 24–43)
MAGNESIUM: 2.2 MG/DL (ref 1.6–2.6)
MCH RBC QN AUTO: 28.7 PG (ref 25.2–33.5)
MCHC RBC AUTO-ENTMCNC: 32.9 G/DL (ref 28.4–34.8)
MCV RBC AUTO: 87.4 FL (ref 82.6–102.9)
MODE: ABNORMAL
MONOCYTES # BLD: 8 % (ref 3–12)
MUCUS: ABNORMAL
NEGATIVE BASE EXCESS, ART: 2 (ref 0–2)
NITRITE, URINE: POSITIVE
NRBC AUTOMATED: 0 PER 100 WBC
O2 DEVICE/FLOW/%: ABNORMAL
OTHER OBSERVATIONS UA: ABNORMAL
PATIENT TEMP: ABNORMAL
PDW BLD-RTO: 13.1 % (ref 11.8–14.4)
PH UA: 5.5 (ref 5–8)
PHOSPHORUS: 3.2 MG/DL (ref 2.6–4.5)
PLATELET # BLD: 231 K/UL (ref 138–453)
PLATELET ESTIMATE: ABNORMAL
PMV BLD AUTO: 10 FL (ref 8.1–13.5)
POC HCO3: 21.6 MMOL/L (ref 21–28)
POC LACTIC ACID: 0.73 MMOL/L (ref 0.56–1.39)
POC O2 SATURATION: 94 % (ref 94–98)
POC PCO2 TEMP: ABNORMAL MM HG
POC PCO2: 32.6 MM HG (ref 35–48)
POC PH TEMP: ABNORMAL
POC PH: 7.43 (ref 7.35–7.45)
POC PO2 TEMP: ABNORMAL MM HG
POC PO2: 69.9 MM HG (ref 83–108)
POSITIVE BASE EXCESS, ART: ABNORMAL (ref 0–3)
POTASSIUM SERPL-SCNC: 3.5 MMOL/L (ref 3.7–5.3)
PROTEIN UA: NEGATIVE
RBC # BLD: 3.34 M/UL (ref 3.95–5.11)
RBC # BLD: ABNORMAL 10*6/UL
RBC UA: ABNORMAL /HPF (ref 0–2)
RENAL EPITHELIAL, UA: ABNORMAL /HPF
SAMPLE SITE: ABNORMAL
SEG NEUTROPHILS: 74 % (ref 36–65)
SEGMENTED NEUTROPHILS ABSOLUTE COUNT: 10.58 K/UL (ref 1.5–8.1)
SODIUM BLD-SCNC: 143 MMOL/L (ref 135–144)
SODIUM BLD-SCNC: 145 MMOL/L (ref 135–144)
SODIUM BLD-SCNC: 146 MMOL/L (ref 135–144)
SODIUM BLD-SCNC: 147 MMOL/L (ref 135–144)
SPECIFIC GRAVITY UA: 1.03 (ref 1–1.03)
TCO2 (CALC), ART: ABNORMAL MMOL/L (ref 22–29)
TRICHOMONAS: ABNORMAL
TRIGL SERPL-MCNC: 74 MG/DL
TURBIDITY: ABNORMAL
URINE HGB: ABNORMAL
UROBILINOGEN, URINE: NORMAL
WBC # BLD: 14.2 K/UL (ref 3.5–11.3)
WBC # BLD: ABNORMAL 10*3/UL
WBC UA: ABNORMAL /HPF (ref 0–5)
YEAST: ABNORMAL

## 2021-08-28 PROCEDURE — 94761 N-INVAS EAR/PLS OXIMETRY MLT: CPT

## 2021-08-28 PROCEDURE — 37799 UNLISTED PX VASCULAR SURGERY: CPT

## 2021-08-28 PROCEDURE — 95714 VEEG EA 12-26 HR UNMNTR: CPT

## 2021-08-28 PROCEDURE — 6360000002 HC RX W HCPCS: Performed by: NEUROLOGICAL SURGERY

## 2021-08-28 PROCEDURE — 2580000003 HC RX 258: Performed by: NEUROLOGICAL SURGERY

## 2021-08-28 PROCEDURE — 82803 BLOOD GASES ANY COMBINATION: CPT

## 2021-08-28 PROCEDURE — 94003 VENT MGMT INPAT SUBQ DAY: CPT

## 2021-08-28 PROCEDURE — 2580000003 HC RX 258: Performed by: STUDENT IN AN ORGANIZED HEALTH CARE EDUCATION/TRAINING PROGRAM

## 2021-08-28 PROCEDURE — 2000000000 HC ICU R&B

## 2021-08-28 PROCEDURE — 83735 ASSAY OF MAGNESIUM: CPT

## 2021-08-28 PROCEDURE — 99291 CRITICAL CARE FIRST HOUR: CPT | Performed by: NEUROLOGICAL SURGERY

## 2021-08-28 PROCEDURE — 6370000000 HC RX 637 (ALT 250 FOR IP): Performed by: STUDENT IN AN ORGANIZED HEALTH CARE EDUCATION/TRAINING PROGRAM

## 2021-08-28 PROCEDURE — 84295 ASSAY OF SERUM SODIUM: CPT

## 2021-08-28 PROCEDURE — 6360000002 HC RX W HCPCS: Performed by: STUDENT IN AN ORGANIZED HEALTH CARE EDUCATION/TRAINING PROGRAM

## 2021-08-28 PROCEDURE — 82947 ASSAY GLUCOSE BLOOD QUANT: CPT

## 2021-08-28 PROCEDURE — 84100 ASSAY OF PHOSPHORUS: CPT

## 2021-08-28 PROCEDURE — 85025 COMPLETE CBC W/AUTO DIFF WBC: CPT

## 2021-08-28 PROCEDURE — 71045 X-RAY EXAM CHEST 1 VIEW: CPT

## 2021-08-28 PROCEDURE — 80048 BASIC METABOLIC PNL TOTAL CA: CPT

## 2021-08-28 PROCEDURE — APPSS60 APP SPLIT SHARED TIME 46-60 MINUTES: Performed by: REGISTERED NURSE

## 2021-08-28 PROCEDURE — 2700000000 HC OXYGEN THERAPY PER DAY

## 2021-08-28 PROCEDURE — 84478 ASSAY OF TRIGLYCERIDES: CPT

## 2021-08-28 PROCEDURE — 95720 EEG PHY/QHP EA INCR W/VEEG: CPT | Performed by: PSYCHIATRY & NEUROLOGY

## 2021-08-28 PROCEDURE — 83605 ASSAY OF LACTIC ACID: CPT

## 2021-08-28 RX ORDER — 3% SODIUM CHLORIDE 3 G/100ML
25 INJECTION, SOLUTION INTRAVENOUS CONTINUOUS
Status: DISCONTINUED | OUTPATIENT
Start: 2021-08-28 | End: 2021-08-29

## 2021-08-28 RX ADMIN — METHOCARBAMOL TABLETS 750 MG: 750 TABLET, COATED ORAL at 15:01

## 2021-08-28 RX ADMIN — GABAPENTIN 300 MG: 300 CAPSULE ORAL at 08:37

## 2021-08-28 RX ADMIN — METHOCARBAMOL TABLETS 750 MG: 750 TABLET, COATED ORAL at 03:14

## 2021-08-28 RX ADMIN — CHLORHEXIDINE GLUCONATE 0.12% ORAL RINSE 15 ML: 1.2 LIQUID ORAL at 08:37

## 2021-08-28 RX ADMIN — GABAPENTIN 300 MG: 300 CAPSULE ORAL at 15:01

## 2021-08-28 RX ADMIN — POLYETHYLENE GLYCOL 3350 17 G: 17 POWDER, FOR SOLUTION ORAL at 08:37

## 2021-08-28 RX ADMIN — OXYCODONE HYDROCHLORIDE 5 MG: 5 TABLET ORAL at 01:50

## 2021-08-28 RX ADMIN — CHLORHEXIDINE GLUCONATE 0.12% ORAL RINSE 15 ML: 1.2 LIQUID ORAL at 20:50

## 2021-08-28 RX ADMIN — LEVETIRACETAM 1000 MG: 500 SOLUTION ORAL at 08:37

## 2021-08-28 RX ADMIN — CEFAZOLIN 2000 MG: 10 INJECTION, POWDER, FOR SOLUTION INTRAVENOUS at 08:37

## 2021-08-28 RX ADMIN — LEVETIRACETAM 1000 MG: 500 SOLUTION ORAL at 20:49

## 2021-08-28 RX ADMIN — SODIUM CHLORIDE, PRESERVATIVE FREE 10 ML: 5 INJECTION INTRAVENOUS at 20:50

## 2021-08-28 RX ADMIN — PROPOFOL 50 MCG/KG/MIN: 10 INJECTION, EMULSION INTRAVENOUS at 03:13

## 2021-08-28 RX ADMIN — ACETAMINOPHEN 1000 MG: 500 TABLET ORAL at 20:52

## 2021-08-28 RX ADMIN — METOCLOPRAMIDE 10 MG: 5 INJECTION, SOLUTION INTRAMUSCULAR; INTRAVENOUS at 01:47

## 2021-08-28 RX ADMIN — ACETAMINOPHEN 1000 MG: 500 TABLET ORAL at 15:01

## 2021-08-28 RX ADMIN — SODIUM CHLORIDE 30 ML/HR: 3 INJECTION, SOLUTION INTRAVENOUS at 08:55

## 2021-08-28 RX ADMIN — METHOCARBAMOL TABLETS 750 MG: 750 TABLET, COATED ORAL at 20:53

## 2021-08-28 RX ADMIN — ACETAMINOPHEN 1000 MG: 500 TABLET ORAL at 06:33

## 2021-08-28 RX ADMIN — FAMOTIDINE 20 MG: 20 TABLET, FILM COATED ORAL at 08:37

## 2021-08-28 RX ADMIN — BISACODYL 10 MG: 10 SUPPOSITORY RECTAL at 08:37

## 2021-08-28 RX ADMIN — Medication 150 MCG/HR: at 03:13

## 2021-08-28 RX ADMIN — POTASSIUM BICARBONATE 20 MEQ: 391 TABLET, EFFERVESCENT ORAL at 09:51

## 2021-08-28 RX ADMIN — FAMOTIDINE 20 MG: 20 TABLET, FILM COATED ORAL at 20:50

## 2021-08-28 RX ADMIN — CEFAZOLIN 2000 MG: 10 INJECTION, POWDER, FOR SOLUTION INTRAVENOUS at 16:42

## 2021-08-28 RX ADMIN — SODIUM CHLORIDE, PRESERVATIVE FREE 10 ML: 5 INJECTION INTRAVENOUS at 08:37

## 2021-08-28 RX ADMIN — METOCLOPRAMIDE 10 MG: 5 INJECTION, SOLUTION INTRAMUSCULAR; INTRAVENOUS at 08:37

## 2021-08-28 RX ADMIN — CEFAZOLIN 2000 MG: 10 INJECTION, POWDER, FOR SOLUTION INTRAVENOUS at 00:46

## 2021-08-28 RX ADMIN — GABAPENTIN 300 MG: 300 CAPSULE ORAL at 00:27

## 2021-08-28 RX ADMIN — METHOCARBAMOL TABLETS 750 MG: 750 TABLET, COATED ORAL at 08:37

## 2021-08-28 ASSESSMENT — PULMONARY FUNCTION TESTS
PIF_VALUE: 12
PIF_VALUE: 22
PIF_VALUE: 25
PIF_VALUE: 48
PIF_VALUE: 20
PIF_VALUE: 27
PIF_VALUE: 13
PIF_VALUE: 18
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
## 2021-08-28 NOTE — PLAN OF CARE
Problem: SKIN INTEGRITY  Goal: Skin integrity is maintained or improved  8/28/2021 1053 by Marilee Coronado RN  Outcome: Ongoing  8/28/2021 0518 by Catarina Barakat RN  Outcome: Ongoing     Problem: Confusion - Acute:  Goal: Absence of continued neurological deterioration signs and symptoms  Description: Absence of continued neurological deterioration signs and symptoms  8/28/2021 1053 by Marilee Coronado RN  Outcome: Ongoing  8/28/2021 0518 by Catarina Barakat RN  Outcome: Ongoing  Goal: Mental status will be restored to baseline  Description: Mental status will be restored to baseline  8/28/2021 1053 by Marilee Coronado RN  Outcome: Ongoing  8/28/2021 0518 by Catarina Barakat RN  Outcome: Ongoing     Problem: Discharge Planning:  Goal: Ability to perform activities of daily living will improve  Description: Ability to perform activities of daily living will improve  8/28/2021 1053 by Marilee Coronado RN  Outcome: Ongoing  8/28/2021 0518 by Catarina Barakat RN  Outcome: Ongoing  Goal: Participates in care planning  Description: Participates in care planning  8/28/2021 1053 by Marilee Coronado RN  Outcome: Ongoing  8/28/2021 0518 by Catarina Barakat RN  Outcome: Ongoing     Problem: Injury - Risk of, Physical Injury:  Goal: Absence of physical injury  Description: Absence of physical injury  8/28/2021 1053 by Marilee Coronado RN  Outcome: Ongoing  8/28/2021 0518 by Catarina Barakat RN  Outcome: Ongoing  Goal: Will remain free from falls  Description: Will remain free from falls  8/28/2021 1053 by Marilee Coronado RN  Outcome: Ongoing  8/28/2021 0518 by Catarina Barakat RN  Outcome: Ongoing     Problem: Mood - Altered:  Goal: Mood stable  Description: Mood stable  8/28/2021 1053 by Marilee Coronado RN  Outcome: Ongoing  8/28/2021 0518 by Catarina Barakat RN  Outcome: Ongoing  Goal: Absence of abusive behavior  Description: Absence of abusive behavior  8/28/2021 1053 by Marilee Coronado RN  Outcome: Ongoing  8/28/2021 nonreality-based thinking  Description: Able to interrupt nonreality-based thinking  8/28/2021 1053 by Jeffery Bob RN  Outcome: Ongoing  8/28/2021 0518 by Yesi Canseco RN  Outcome: Ongoing     Problem: Sleep Pattern Disturbance:  Goal: Appears well-rested  Description: Appears well-rested  8/28/2021 1053 by Jeffery Bob RN  Outcome: Ongoing  8/28/2021 0518 by Yesi Canseco RN  Outcome: Ongoing     Problem: Skin Integrity:  Goal: Will show no infection signs and symptoms  Description: Will show no infection signs and symptoms  8/28/2021 1053 by Jeffery Bob RN  Outcome: Ongoing  8/28/2021 0518 by Yesi Canseco RN  Outcome: Ongoing  Goal: Absence of new skin breakdown  Description: Absence of new skin breakdown  8/28/2021 1053 by Jeffery Bob RN  Outcome: Ongoing  8/28/2021 0518 by Yesi Canseco RN  Outcome: Ongoing     Problem: Falls - Risk of:  Goal: Absence of physical injury  Description: Absence of physical injury  8/28/2021 1053 by Jeffery Bob RN  Outcome: Ongoing  8/28/2021 0518 by Yesi Canseco RN  Outcome: Ongoing  Goal: Will remain free from falls  Description: Will remain free from falls  8/28/2021 1053 by Jeffery Bob RN  Outcome: Ongoing  8/28/2021 0518 by Yesi Canseco RN  Outcome: Ongoing     Problem: Non-Violent Restraints  Goal: Removal from restraints as soon as assessed to be safe  8/28/2021 1053 by Jeffery Bob RN  Outcome: Ongoing  8/28/2021 0518 by Yesi Canseco RN  Outcome: Ongoing  Goal: No harm/injury to patient while restraints in use  8/28/2021 1053 by Jeffery Bob RN  Outcome: Ongoing  8/28/2021 0518 by Yesi Canseco RN  Outcome: Ongoing  Goal: Patient's dignity will be maintained  8/28/2021 1053 by Jeffery Bob RN  Outcome: Ongoing  8/28/2021 0518 by Yesi Canseco RN  Outcome: Ongoing     Problem: Nutrition  Goal: Optimal nutrition therapy  8/28/2021 1053 by Jeffery Bob RN  Outcome: Ongoing  8/28/2021 0518 by Yesi Canseco, RN  Outcome: Ongoing

## 2021-08-28 NOTE — PLAN OF CARE
Problem: OXYGENATION/RESPIRATORY FUNCTION  Goal: Patient will maintain patent airway  8/28/2021 0518 by Cj Chung RN  Outcome: Ongoing  8/27/2021 2100 by Saul Davis RCP  Outcome: Ongoing  8/27/2021 1601 by Mirna Parikh RN  Outcome: Ongoing  Goal: Patient will achieve/maintain normal respiratory rate/effort  Description: Respiratory rate and effort will be within normal limits for the patient  8/28/2021 0518 by Cj Chung RN  Outcome: Ongoing  8/27/2021 2100 by Saul Davis RCP  Outcome: Ongoing  8/27/2021 1601 by Mirna Parikh RN  Outcome: Ongoing     Problem: MECHANICAL VENTILATION  Goal: Patient will maintain patent airway  8/28/2021 0518 by Cj Chung RN  Outcome: Ongoing  8/27/2021 2100 by Saul Davis RCP  Outcome: Ongoing  8/27/2021 1601 by Mirna Parikh RN  Outcome: Ongoing  Goal: Oral health is maintained or improved  8/28/2021 0518 by Cj Chung RN  Outcome: Ongoing  8/27/2021 2100 by Saul Davis RCP  Outcome: Ongoing  8/27/2021 1601 by Mirna Parikh RN  Outcome: Ongoing  Goal: ET tube will be managed safely  8/28/2021 0518 by Cj Chung RN  Outcome: Ongoing  8/27/2021 2100 by Saul Davis RCP  Outcome: Ongoing  8/27/2021 1601 by Mirna Parikh RN  Outcome: Ongoing  Goal: Ability to express needs and understand communication  8/28/2021 0518 by Cj Chung RN  Outcome: Ongoing  8/27/2021 2100 by Saul Davis RCP  Outcome: Ongoing  8/27/2021 1601 by Mirna Parikh RN  Outcome: Ongoing  Goal: Mobility/activity is maintained at optimum level for patient  8/28/2021 0518 by Cj Chung RN  Outcome: Ongoing  8/27/2021 2100 by Saul Davis RCP  Outcome: Ongoing  8/27/2021 1601 by Mirna Parikh RN  Outcome: Ongoing     Problem: SKIN INTEGRITY  Goal: Skin integrity is maintained or improved  8/28/2021 0518 by Cj Chung RN  Outcome: Ongoing  8/27/2021 1601 by Mirna Parikh RN  Outcome: Ongoing     Problem: Discharge Planning:  Goal: Ability to perform activities of daily living will improve  Description: Ability to perform activities of daily living will improve  8/28/2021 0518 by Annie Seth RN  Outcome: Ongoing  8/27/2021 1601 by Beto Smallwood RN  Outcome: Ongoing  Goal: Participates in care planning  Description: Participates in care planning  8/28/2021 0518 by Annie Seth RN  Outcome: Ongoing  8/27/2021 1601 by Beto Smallwood RN  Outcome: Ongoing     Problem: Injury - Risk of, Physical Injury:  Goal: Absence of physical injury  Description: Absence of physical injury  8/28/2021 0518 by Annie Seth RN  Outcome: Ongoing  8/27/2021 1601 by Beto Smallwood RN  Outcome: Ongoing  Goal: Will remain free from falls  Description: Will remain free from falls  8/28/2021 0518 by Annie Seth RN  Outcome: Ongoing  8/27/2021 1601 by Beto Smallwood RN  Outcome: Ongoing     Problem: Mood - Altered:  Goal: Mood stable  Description: Mood stable  8/28/2021 0518 by Annie Seht RN  Outcome: Ongoing  8/27/2021 1601 by Beto Smallwood RN  Outcome: Ongoing  Goal: Absence of abusive behavior  Description: Absence of abusive behavior  8/28/2021 0518 by Annie Seth RN  Outcome: Ongoing  8/27/2021 1601 by Beto Smallwood RN  Outcome: Ongoing  Goal: Verbalizations of feeling emotionally comfortable while being cared for will increase  Description: Verbalizations of feeling emotionally comfortable while being cared for will increase  8/28/2021 0518 by Annie Seth RN  Outcome: Ongoing  8/27/2021 1601 by Beto Smallwood RN  Outcome: Ongoing     Problem: Psychomotor Activity - Altered:  Goal: Absence of psychomotor disturbance signs and symptoms  Description: Absence of psychomotor disturbance signs and symptoms  8/28/2021 0518 by Annie Seth RN  Outcome: Ongoing  8/27/2021 1601 by Beto Smallwood RN  Outcome: Ongoing     Problem: Sensory Perception - Impaired:  Goal: Demonstrations of improved sensory functioning will increase  Description: Demonstrations of improved sensory functioning will increase  8/28/2021 0518 by Sagar Kiser RN  Outcome: Ongoing  8/27/2021 1601 by Daniel Richard RN  Outcome: Ongoing  Goal: Decrease in sensory misperception frequency  Description: Decrease in sensory misperception frequency  8/28/2021 0518 by Sagar Kiser RN  Outcome: Ongoing  8/27/2021 1601 by Daniel Richard RN  Outcome: Ongoing  Goal: Able to refrain from responding to false sensory perceptions  Description: Able to refrain from responding to false sensory perceptions  8/28/2021 0518 by Sagar Kiser RN  Outcome: Ongoing  8/27/2021 1601 by Daniel Richard RN  Outcome: Ongoing  Goal: Demonstrates accurate environmental perceptions  Description: Demonstrates accurate environmental perceptions  8/28/2021 0518 by Sagar Kiser RN  Outcome: Ongoing  8/27/2021 1601 by Daniel Richard RN  Outcome: Ongoing  Goal: Able to distinguish between reality-based and nonreality-based thinking  Description: Able to distinguish between reality-based and nonreality-based thinking  8/28/2021 0518 by Sagar Kiser RN  Outcome: Ongoing  8/27/2021 1601 by Daniel Richard RN  Outcome: Ongoing  Goal: Able to interrupt nonreality-based thinking  Description: Able to interrupt nonreality-based thinking  8/28/2021 0518 by Sagar Kiser RN  Outcome: Ongoing  8/27/2021 1601 by Daniel Richard RN  Outcome: Ongoing     Problem: Falls - Risk of:  Goal: Absence of physical injury  Description: Absence of physical injury  8/28/2021 0518 by Sagar Kiser RN  Outcome: Ongoing  8/27/2021 1601 by Daniel Richard RN  Outcome: Ongoing  Goal: Will remain free from falls  Description: Will remain free from falls  8/28/2021 0518 by Sagar Kiser RN  Outcome: Ongoing  8/27/2021 1601 by Daniel Richard RN  Outcome: Ongoing     Problem: Non-Violent Restraints  Goal: Removal from restraints as soon as assessed to be safe  8/28/2021 0518 by Josseline Andrews RN  Outcome: Ongoing  8/27/2021 1601 by Karen Corona RN  Outcome: Ongoing  Goal: No harm/injury to patient while restraints in use  8/28/2021 0518 by Josseline Andrews RN  Outcome: Ongoing  8/27/2021 1601 by Karen Corona RN  Outcome: Ongoing  Goal: Patient's dignity will be maintained  8/28/2021 0518 by Josseline Andrews RN  Outcome: Ongoing  8/27/2021 1601 by Karen Corona RN  Outcome: Ongoing     Problem: Nutrition  Goal: Optimal nutrition therapy  8/28/2021 0518 by Josseline Andrews RN  Outcome: Ongoing  8/27/2021 1601 by Karen Corona RN  Outcome: Ongoing

## 2021-08-28 NOTE — PROCEDURES
DO   10 mL at 08/28/21 0837    sodium chloride flush 0.9 % injection 5-40 mL  5-40 mL IntraVENous PRN Beth Arnaud, DO        0.9 % sodium chloride infusion  25 mL IntraVENous PRN Beth Barrett, DO        ondansetron (ZOFRAN-ODT) disintegrating tablet 4 mg  4 mg Oral Q8H PRN Beth Barrett, DO        Or    ondansetron (ZOFRAN) injection 4 mg  4 mg IntraVENous Q6H PRN Beth Barrett, DO        polyethylene glycol (GLYCOLAX) packet 17 g  17 g Oral Daily Beth Barrett, DO   17 g at 08/28/21 0837    bisacodyl (DULCOLAX) suppository 10 mg  10 mg Rectal Daily Beth Arnaud, DO   10 mg at 08/28/21 0837    0.9 % sodium chloride infusion   IntraVENous Continuous CAIO Castrejon - CNP 10 mL/hr at 08/27/21 1040 Rate Change at 08/27/21 1040    acetaminophen (TYLENOL) tablet 1,000 mg  1,000 mg Oral 3 times per day Beth Arnaud, DO   1,000 mg at 08/28/21 1311    gabapentin (NEURONTIN) capsule 300 mg  300 mg Oral Q8H Beth Arnaud, DO   300 mg at 08/28/21 0837    methocarbamol (ROBAXIN) tablet 750 mg  750 mg Oral Q6H Beth Barrett, DO   750 mg at 08/28/21 0785     Technical Description: This is a 21-channel digital EEG recording with time-locked video. Electrodes were placed in accordance with the 10-20 International System of Electrode Placement. Single lead EKG monitoring was included. Baseline EEG Recording:  A formal baseline EEG recording was not obtained. Day 1 - 8/27/21, starting at 1433    Interictal EEG Samples: The background activity consisted of 4 to 5 Hz of polymorphic delta and theta activity of 15 to 20 µV. There was poor anterior posterior amplitude gradient. Frequent periods of diffuse attenuation were seen lasting for up to 3 to 4 seconds. The background appears symmetric. Normal wakeful pattern and normal sleep architecture was not seen. The EKG channel revealed no abnormalities.     Ictal EEG Recording / Patient Events: During this period the patient had no events or seizures. Summary: During this day of recording no events were recorded. The interictal EEG was abnormal due to diffuse polymorphic delta theta slowing and periods of diffuse attenuation suggesting severe encephalopathy. No abnormal epileptiform activity was seen. Monitoring was continued in order to record the patient's typical events. The EKG channel revealed no abnormalities. Day 2 - 8/28/21, reviewed through 7:30 am    Interictal EEG Samples: As the recording progressed, the background appeared more continuous. Ictal EEG Recording / Patient Events: During this period the patient had no events or seizures. Summary: During this day of recording no events were recorded. The interictal EEG was abnormal due to diffuse polymorphic delta and theta slowing suggesting severe encephalopathy, minimally improved from yesterday. No abnormal epileptiform activity was seen. Monitoring was continued in order to record the patients typical events. The EKG channel revealed no abnormalities. Zach Amaya MD  Diplomate, American Board of Psychiatry and Neurology  Diplomate, American Board of Clinical Neurophysiology  Diplomate, American Board of Epilepsy         Please note this is a preliminary report and updated daily. The final report will have a summary of behavior and electrographic findings with clinical correlation.

## 2021-08-28 NOTE — PLAN OF CARE
Problem: OXYGENATION/RESPIRATORY FUNCTION  Goal: Patient will maintain patent airway  8/27/2021 2100 by Norberto Starr RCP  Outcome: Ongoing     Problem: OXYGENATION/RESPIRATORY FUNCTION  Goal: Patient will achieve/maintain normal respiratory rate/effort  Description: Respiratory rate and effort will be within normal limits for the patient  8/27/2021 2100 by Norberto Starr RCP  Outcome: Ongoing     Problem: MECHANICAL VENTILATION  Goal: Patient will maintain patent airway  8/27/2021 2100 by Norberto Starr RCP  Outcome: Ongoing     Problem: MECHANICAL VENTILATION  Goal: Oral health is maintained or improved  8/27/2021 2100 by Norberto Starr RCP  Outcome: Ongoing     Problem: MECHANICAL VENTILATION  Goal: ET tube will be managed safely  8/27/2021 2100 by Norberto Starr RCP  Outcome: Ongoing     Problem: MECHANICAL VENTILATION  Goal: Ability to express needs and understand communication  8/27/2021 2100 by Norberto Starr RCP  Outcome: Ongoing     Problem: MECHANICAL VENTILATION  Goal: Mobility/activity is maintained at optimum level for patient  8/27/2021 2100 by Norberto Starr RCP  Outcome: Ongoing

## 2021-08-28 NOTE — FLOWSHEET NOTE
Patient's mother updated on patient condition over night. All her questions answered at this time. She states still will be heading towards to the hospital to visit shortly.     Electronically signed by Betsy Mccullough RN on 8/28/2021 at 9:26 AM

## 2021-08-28 NOTE — PLAN OF CARE
Problem: OXYGENATION/RESPIRATORY FUNCTION  Goal: Patient will maintain patent airway  8/28/2021 1057 by Jordy Ma RCP  Outcome: Ongoing     Problem: OXYGENATION/RESPIRATORY FUNCTION  Goal: Patient will achieve/maintain normal respiratory rate/effort  Description: Respiratory rate and effort will be within normal limits for the patient  8/28/2021 1057 by Jordy Ma RCP  Outcome: Ongoing  Note:   PROVIDE ADEQUATE OXYGENATION WITH ACCEPTABLE SP02/ABG'S    [x]  IDENTIFY APPROPRIATE OXYGEN THERAPY  [x]   MONITOR SP02/ABG'S AS NEEDED   [x]   PATIENT EDUCATION AS NEEDED        Problem: MECHANICAL VENTILATION  Goal: Patient will maintain patent airway  8/28/2021 1057 by Jordy Ma RCP  Outcome: Ongoing  Note: MECHANICAL VENTILATION     [x]  PROVIDE OPTIMAL VENTILATION  [x]   ASSESS FOR EXTUBATION READINESS  [x]   ASSESS FOR WEANING READINESS  [x]  EXTUBATE AS TOLERATED  [x]  IMPLEMENT ADULT MECHANICAL VENTILATION PROTOCOL  [x]  MAINTAIN ADEQUATE OXYGENATION  [x]  PERFORM SPONTANEOUS WEANING TRIAL AS TOLERATED        Problem: MECHANICAL VENTILATION  Goal: Oral health is maintained or improved  8/28/2021 1057 by Jordy Ma RCP  Outcome: Ongoing     Problem: MECHANICAL VENTILATION  Goal: ET tube will be managed safely  8/28/2021 1057 by Jordy Ma RCP  Outcome: Ongoing     Problem: MECHANICAL VENTILATION  Goal: Ability to express needs and understand communication  8/28/2021 1057 by Jordy Ma RCP  Outcome: Ongoing     Problem: MECHANICAL VENTILATION  Goal: Mobility/activity is maintained at optimum level for patient  8/28/2021 1057 by Jordy Ma RCP  Outcome: Ongoing     Problem: SKIN INTEGRITY  Goal: Skin integrity is maintained or improved  8/28/2021 1057 by Jordy Ma RCP  Outcome: Ongoing
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
## 2021-08-28 NOTE — PLAN OF CARE
Problem: OXYGENATION/RESPIRATORY FUNCTION  Goal: Patient will maintain patent airway  8/28/2021 1135 by Rich Tena RN  Outcome: Ongoing  8/28/2021 1057 by Ava Garnica RCP  Outcome: Ongoing  8/28/2021 1053 by Gillian Cisneros RN  Outcome: Ongoing  8/28/2021 0518 by Lauren Holguin RN  Outcome: Ongoing  Goal: Patient will achieve/maintain normal respiratory rate/effort  Description: Respiratory rate and effort will be within normal limits for the patient  8/28/2021 1135 by Rich Tena RN  Outcome: Ongoing  8/28/2021 1057 by Ava Garnica RCP  Outcome: Ongoing  Note:   PROVIDE ADEQUATE OXYGENATION WITH ACCEPTABLE SP02/ABG'S    [x]  IDENTIFY APPROPRIATE OXYGEN THERAPY  [x]   MONITOR SP02/ABG'S AS NEEDED   [x]   PATIENT EDUCATION AS NEEDED     8/28/2021 1053 by Gillian Cisneros RN  Outcome: Ongoing  8/28/2021 0518 by Lauren Holguin RN  Outcome: Ongoing     Problem: MECHANICAL VENTILATION  Goal: Patient will maintain patent airway  8/28/2021 1135 by Rich Tena RN  Outcome: Ongoing  8/28/2021 1057 by Ava Garnica RCP  Outcome: Ongoing  Note: MECHANICAL VENTILATION     [x]  PROVIDE OPTIMAL VENTILATION  [x]   ASSESS FOR EXTUBATION READINESS  [x]   ASSESS FOR WEANING READINESS  [x]  EXTUBATE AS TOLERATED  [x]  IMPLEMENT ADULT MECHANICAL VENTILATION PROTOCOL  [x]  MAINTAIN ADEQUATE OXYGENATION  [x]  PERFORM SPONTANEOUS WEANING TRIAL AS TOLERATED     8/28/2021 1053 by Gillian Cisneros RN  Outcome: Ongoing  8/28/2021 0518 by Lauren Holguin RN  Outcome: Ongoing  Goal: Oral health is maintained or improved  8/28/2021 1135 by Rich Tena RN  Outcome: Ongoing  8/28/2021 1057 by Ava Garnica RCP  Outcome: Ongoing  8/28/2021 1053 by Gillian Cisneros RN  Outcome: Ongoing  8/28/2021 0518 by Lauren Holguin RN  Outcome: Ongoing  Goal: ET tube will be managed safely  8/28/2021 1135 by Rich Tena RN  Outcome: Ongoing  8/28/2021 1057 by Ava Garnica RCP  Outcome: Ongoing  8/28/2021 1053 by Gillian Cisneros RN  Outcome: Ongoing  8/28/2021 0518 by Bethany Rivas RN  Outcome: Ongoing  Goal: Ability to express needs and understand communication  8/28/2021 1135 by Radha Laws RN  Outcome: Ongoing  8/28/2021 1057 by Bharti Freeman RCP  Outcome: Ongoing  8/28/2021 1053 by Dontrell Worley RN  Outcome: Ongoing  8/28/2021 0518 by Bethany Rivas RN  Outcome: Ongoing  Goal: Mobility/activity is maintained at optimum level for patient  8/28/2021 1135 by Radha Laws RN  Outcome: Ongoing  8/28/2021 1057 by Bharti Freeman RCP  Outcome: Ongoing  8/28/2021 1053 by Dontrell Worley RN  Outcome: Ongoing  8/28/2021 0518 by Bethany Rivas RN  Outcome: Ongoing     Problem: SKIN INTEGRITY  Goal: Skin integrity is maintained or improved  8/28/2021 1135 by Radha Laws RN  Outcome: Ongoing  8/28/2021 1057 by Bharti Freeman RCP  Outcome: Ongoing  8/28/2021 1053 by Dontrell Worley RN  Outcome: Ongoing  8/28/2021 0518 by Bethany Rivas RN  Outcome: Ongoing     Problem: Confusion - Acute:  Goal: Absence of continued neurological deterioration signs and symptoms  Description: Absence of continued neurological deterioration signs and symptoms  8/28/2021 1135 by Radha Laws RN  Outcome: Ongoing  8/28/2021 1053 by Dontrell Worley RN  Outcome: Ongoing  8/28/2021 0518 by Bethany Rivas RN  Outcome: Ongoing  Goal: Mental status will be restored to baseline  Description: Mental status will be restored to baseline  8/28/2021 1135 by Radha Laws RN  Outcome: Ongoing  8/28/2021 1053 by Dontrell Worley RN  Outcome: Ongoing  8/28/2021 0518 by Bethany Rivas RN  Outcome: Ongoing     Problem: Discharge Planning:  Goal: Ability to perform activities of daily living will improve  Description: Ability to perform activities of daily living will improve  8/28/2021 1135 by Radha Laws RN  Outcome: Ongoing  8/28/2021 1053 by Dontrell Worley RN  Outcome: Ongoing  8/28/2021 0518 by Bethany Rivas RN  Outcome: Ongoing  Goal: Participates in care planning  Description: Participates in care planning  8/28/2021 1135 by Thuy Claudio RN  Outcome: Ongoing  8/28/2021 1053 by Jeffery Bob RN  Outcome: Ongoing  8/28/2021 0518 by Yesi Canseco RN  Outcome: Ongoing     Problem: Injury - Risk of, Physical Injury:  Goal: Absence of physical injury  Description: Absence of physical injury  8/28/2021 1135 by Thuy Claudio RN  Outcome: Ongoing  8/28/2021 1053 by Jeffery Bob RN  Outcome: Ongoing  8/28/2021 0518 by Yesi Canseco RN  Outcome: Ongoing  Goal: Will remain free from falls  Description: Will remain free from falls  8/28/2021 1135 by Thuy Claudio RN  Outcome: Ongoing  8/28/2021 1053 by Jeffery Bob RN  Outcome: Ongoing  8/28/2021 0518 by Yesi Cansceo RN  Outcome: Ongoing     Problem: Mood - Altered:  Goal: Mood stable  Description: Mood stable  8/28/2021 1135 by Thuy Claudio RN  Outcome: Ongoing  8/28/2021 1053 by Jeffery Bob RN  Outcome: Ongoing  8/28/2021 0518 by Yesi Canseco RN  Outcome: Ongoing  Goal: Absence of abusive behavior  Description: Absence of abusive behavior  8/28/2021 1135 by Thuy Claudio RN  Outcome: Ongoing  8/28/2021 1053 by Jeffery Bob RN  Outcome: Ongoing  8/28/2021 0518 by Yesi Canseco RN  Outcome: Ongoing  Goal: Verbalizations of feeling emotionally comfortable while being cared for will increase  Description: Verbalizations of feeling emotionally comfortable while being cared for will increase  8/28/2021 1135 by Thuy Claudio RN  Outcome: Ongoing  8/28/2021 1053 by Jeffery Bob RN  Outcome: Ongoing  8/28/2021 0518 by Yesi Canseco RN  Outcome: Ongoing     Problem: Psychomotor Activity - Altered:  Goal: Absence of psychomotor disturbance signs and symptoms  Description: Absence of psychomotor disturbance signs and symptoms  8/28/2021 1135 by Thuy Claudio RN  Outcome: Ongoing  8/28/2021 1053 by Jeffery Bob RN  Outcome: Ongoing  8/28/2021 0518 by Yesi Canseco RN  Outcome: Ongoing Problem: Sensory Perception - Impaired:  Goal: Demonstrations of improved sensory functioning will increase  Description: Demonstrations of improved sensory functioning will increase  8/28/2021 1135 by Stephani Hinojosa RN  Outcome: Ongoing  8/28/2021 1053 by Chryl Fothergill, RN  Outcome: Ongoing  8/28/2021 0518 by Dhara Goff RN  Outcome: Ongoing  Goal: Decrease in sensory misperception frequency  Description: Decrease in sensory misperception frequency  8/28/2021 1135 by Stephani Hinojosa RN  Outcome: Ongoing  8/28/2021 1053 by Chryl Fothergill, RN  Outcome: Ongoing  8/28/2021 0518 by Dhara Goff RN  Outcome: Ongoing  Goal: Able to refrain from responding to false sensory perceptions  Description: Able to refrain from responding to false sensory perceptions  8/28/2021 1135 by Stephani Hinojosa RN  Outcome: Ongoing  8/28/2021 1053 by Chryl Fothergill, RN  Outcome: Ongoing  8/28/2021 0518 by Dhara Goff RN  Outcome: Ongoing  Goal: Demonstrates accurate environmental perceptions  Description: Demonstrates accurate environmental perceptions  8/28/2021 1135 by Stephani Hinojosa RN  Outcome: Ongoing  8/28/2021 1053 by Chryl Fothergill, RN  Outcome: Ongoing  8/28/2021 0518 by Dhara Goff RN  Outcome: Ongoing  Goal: Able to distinguish between reality-based and nonreality-based thinking  Description: Able to distinguish between reality-based and nonreality-based thinking  8/28/2021 1135 by Stephani Hinojosa RN  Outcome: Ongoing  8/28/2021 1053 by Chryl Fothergill, RN  Outcome: Ongoing  8/28/2021 0518 by Dhara Goff RN  Outcome: Ongoing  Goal: Able to interrupt nonreality-based thinking  Description: Able to interrupt nonreality-based thinking  8/28/2021 1135 by Stephani Hinojosa RN  Outcome: Ongoing  8/28/2021 1053 by Chryl Fothergill, RN  Outcome: Ongoing  8/28/2021 0518 by Dhara Goff RN  Outcome: Ongoing     Problem: Sleep Pattern Disturbance:  Goal: Appears well-rested  Description: Appears well-rested  8/28/2021 1135 by Luis A Yañez RN  Outcome: Ongoing  8/28/2021 1053 by Casey Maier RN  Outcome: Ongoing  8/28/2021 0518 by Tony Hay RN  Outcome: Ongoing     Problem: Skin Integrity:  Goal: Will show no infection signs and symptoms  Description: Will show no infection signs and symptoms  8/28/2021 1135 by Luis A Yañez RN  Outcome: Ongoing  8/28/2021 1053 by Casey Maier RN  Outcome: Ongoing  8/28/2021 0518 by Tony Hay RN  Outcome: Ongoing  Goal: Absence of new skin breakdown  Description: Absence of new skin breakdown  8/28/2021 1135 by Luis A Yañez RN  Outcome: Ongoing  8/28/2021 1053 by Casey Maier RN  Outcome: Ongoing  8/28/2021 0518 by Tony Hay RN  Outcome: Ongoing     Problem: Falls - Risk of:  Goal: Absence of physical injury  Description: Absence of physical injury  8/28/2021 1135 by Luis A Yañez RN  Outcome: Ongoing  8/28/2021 1053 by Casey Maier RN  Outcome: Ongoing  8/28/2021 0518 by Tony Hay RN  Outcome: Ongoing  Goal: Will remain free from falls  Description: Will remain free from falls  8/28/2021 1135 by Luis A Yañez RN  Outcome: Ongoing  8/28/2021 1053 by Casey Maier RN  Outcome: Ongoing  8/28/2021 0518 by Tony Hay RN  Outcome: Ongoing     Problem: Non-Violent Restraints  Goal: Removal from restraints as soon as assessed to be safe  8/28/2021 1135 by Luis A Yañez RN  Outcome: Ongoing  8/28/2021 1053 by Casey Maier RN  Outcome: Ongoing  8/28/2021 0518 by Tony Hay RN  Outcome: Ongoing  Goal: No harm/injury to patient while restraints in use  8/28/2021 1135 by Luis A Yañez RN  Outcome: Ongoing  8/28/2021 1053 by Casey Maier RN  Outcome: Ongoing  8/28/2021 0518 by Tony Hay RN  Outcome: Ongoing  Goal: Patient's dignity will be maintained  8/28/2021 1135 by Luis A Yañez RN  Outcome: Ongoing  8/28/2021 1053 by Casey Maier RN  Outcome: Ongoing  8/28/2021 0518 by Tony Hay RN  Outcome: Ongoing     Problem: Nutrition  Goal: Optimal nutrition therapy  8/28/2021 1135 by Elvira Pereyra RN  Outcome: Ongoing  8/28/2021 1053 by Mirna Parikh RN  Outcome: Ongoing  8/28/2021 0518 by Cj Chung RN  Outcome: Ongoing

## 2021-08-29 ENCOUNTER — APPOINTMENT (OUTPATIENT)
Dept: CT IMAGING | Age: 36
DRG: 003 | End: 2021-08-29
Payer: COMMERCIAL

## 2021-08-29 ENCOUNTER — APPOINTMENT (OUTPATIENT)
Dept: GENERAL RADIOLOGY | Age: 36
DRG: 003 | End: 2021-08-29
Payer: COMMERCIAL

## 2021-08-29 ENCOUNTER — ANESTHESIA EVENT (OUTPATIENT)
Dept: OPERATING ROOM | Age: 36
DRG: 003 | End: 2021-08-29
Payer: COMMERCIAL

## 2021-08-29 ENCOUNTER — ANESTHESIA (OUTPATIENT)
Dept: OPERATING ROOM | Age: 36
DRG: 003 | End: 2021-08-29
Payer: COMMERCIAL

## 2021-08-29 VITALS — OXYGEN SATURATION: 100 %

## 2021-08-29 LAB
ABSOLUTE EOS #: 0.07 K/UL (ref 0–0.44)
ABSOLUTE IMMATURE GRANULOCYTE: 0.09 K/UL (ref 0–0.3)
ABSOLUTE LYMPH #: 1.17 K/UL (ref 1.1–3.7)
ABSOLUTE MONO #: 1.01 K/UL (ref 0.1–1.2)
ALLEN TEST: ABNORMAL
ALLEN TEST: ABNORMAL
ANION GAP SERPL CALCULATED.3IONS-SCNC: 9 MMOL/L (ref 9–17)
APPEARANCE CSF: ABNORMAL
BANDS, CSF: NORMAL %
BASO CSF: NORMAL %
BASOPHILS # BLD: 0 % (ref 0–2)
BASOPHILS ABSOLUTE: 0.05 K/UL (ref 0–0.2)
BLAST CSF: NORMAL %
BUN BLDV-MCNC: 16 MG/DL (ref 6–20)
BUN/CREAT BLD: ABNORMAL (ref 9–20)
CALCIUM SERPL-MCNC: 8.2 MG/DL (ref 8.6–10.4)
CHLORIDE BLD-SCNC: 113 MMOL/L (ref 98–107)
CO2: 22 MMOL/L (ref 20–31)
CREAT SERPL-MCNC: 0.32 MG/DL (ref 0.5–0.9)
DIFFERENTIAL TYPE: ABNORMAL
EOS CSF: NORMAL %
EOSINOPHILS RELATIVE PERCENT: 1 % (ref 1–4)
FIO2: 30
FIO2: 30
FLUID DIFF COMMENT: NORMAL
GFR AFRICAN AMERICAN: >60 ML/MIN
GFR NON-AFRICAN AMERICAN: >60 ML/MIN
GFR SERPL CREATININE-BSD FRML MDRD: ABNORMAL ML/MIN/{1.73_M2}
GFR SERPL CREATININE-BSD FRML MDRD: ABNORMAL ML/MIN/{1.73_M2}
GLUCOSE BLD-MCNC: 121 MG/DL (ref 74–100)
GLUCOSE BLD-MCNC: 123 MG/DL (ref 74–100)
GLUCOSE BLD-MCNC: 126 MG/DL (ref 70–99)
GLUCOSE BLD-MCNC: 135 MG/DL (ref 65–105)
GLUCOSE, CSF: 86 MG/DL (ref 40–70)
HCT VFR BLD CALC: 27.4 % (ref 36.3–47.1)
HEMOGLOBIN: 8.9 G/DL (ref 11.9–15.1)
IMMATURE GRANULOCYTES: 1 %
LYMPHOCYTES # BLD: 8 % (ref 24–43)
LYMPHS CSF: 20 %
MAGNESIUM: 2 MG/DL (ref 1.6–2.6)
MAGNESIUM: 2.3 MG/DL (ref 1.6–2.6)
MCH RBC QN AUTO: 28.3 PG (ref 25.2–33.5)
MCHC RBC AUTO-ENTMCNC: 32.5 G/DL (ref 28.4–34.8)
MCV RBC AUTO: 87.3 FL (ref 82.6–102.9)
METAYELO CSF: NORMAL %
MODE: ABNORMAL
MODE: ABNORMAL
MONO/MACROPHAGE CSF (MANUAL): NORMAL %
MONOCYTES # BLD: 7 % (ref 3–12)
MYELOCYTE CSF: NORMAL %
NEGATIVE BASE EXCESS, ART: 1 (ref 0–2)
NEGATIVE BASE EXCESS, ART: ABNORMAL (ref 0–2)
NEUTROPHILS, CSF: 80 %
NRBC AUTOMATED: 0 PER 100 WBC
O2 DEVICE/FLOW/%: ABNORMAL
O2 DEVICE/FLOW/%: ABNORMAL
OTHER CELLS FLUID: 0 %
PATIENT TEMP: 38
PATIENT TEMP: 38.6
PDW BLD-RTO: 13.2 % (ref 11.8–14.4)
PHOSPHORUS: 2.4 MG/DL (ref 2.6–4.5)
PLATELET # BLD: 241 K/UL (ref 138–453)
PLATELET ESTIMATE: ABNORMAL
PMV BLD AUTO: 9.8 FL (ref 8.1–13.5)
POC HCO3: 22.3 MMOL/L (ref 21–28)
POC HCO3: 23.9 MMOL/L (ref 21–28)
POC LACTIC ACID: 0.83 MMOL/L (ref 0.56–1.39)
POC LACTIC ACID: 0.88 MMOL/L (ref 0.56–1.39)
POC O2 SATURATION: 96 % (ref 94–98)
POC O2 SATURATION: 99 % (ref 94–98)
POC PCO2 TEMP: 32 MM HG
POC PCO2 TEMP: 36 MM HG
POC PCO2: 30.2 MM HG (ref 35–48)
POC PCO2: 34.5 MM HG (ref 35–48)
POC PH TEMP: 7.43
POC PH TEMP: 7.45
POC PH: 7.45 (ref 7.35–7.45)
POC PH: 7.47 (ref 7.35–7.45)
POC PO2 TEMP: 132 MM HG
POC PO2 TEMP: 85 MM HG
POC PO2: 121.3 MM HG (ref 83–108)
POC PO2: 79.1 MM HG (ref 83–108)
POSITIVE BASE EXCESS, ART: 0 (ref 0–3)
POSITIVE BASE EXCESS, ART: ABNORMAL (ref 0–3)
POTASSIUM SERPL-SCNC: 3.8 MMOL/L (ref 3.7–5.3)
PROCALCITONIN: 0.19 NG/ML
PROTEIN CSF: 115 MG/DL (ref 15–45)
RBC # BLD: 3.14 M/UL (ref 3.95–5.11)
RBC # BLD: ABNORMAL 10*6/UL
RBC CSF: ABNORMAL /MM3
SAMPLE SITE: ABNORMAL
SAMPLE SITE: ABNORMAL
SEG NEUTROPHILS: 83 % (ref 36–65)
SEGMENTED NEUTROPHILS ABSOLUTE COUNT: 12.45 K/UL (ref 1.5–8.1)
SODIUM BLD-SCNC: 141 MMOL/L (ref 135–144)
SODIUM BLD-SCNC: 143 MMOL/L (ref 135–144)
SODIUM BLD-SCNC: 144 MMOL/L (ref 135–144)
SODIUM BLD-SCNC: 144 MMOL/L (ref 135–144)
SUPERNAT COLOR CSF: ABNORMAL
TCO2 (CALC), ART: ABNORMAL MMOL/L (ref 22–29)
TCO2 (CALC), ART: ABNORMAL MMOL/L (ref 22–29)
TUBE NUMBER CSF: ABNORMAL
VOLUME CSF: 3
WBC # BLD: 14.8 K/UL (ref 3.5–11.3)
WBC # BLD: ABNORMAL 10*3/UL
WBC CSF: 22 /MM3
XANTHOCHROMIA: PRESENT

## 2021-08-29 PROCEDURE — 71045 X-RAY EXAM CHEST 1 VIEW: CPT

## 2021-08-29 PROCEDURE — 94761 N-INVAS EAR/PLS OXIMETRY MLT: CPT

## 2021-08-29 PROCEDURE — 2580000003 HC RX 258: Performed by: STUDENT IN AN ORGANIZED HEALTH CARE EDUCATION/TRAINING PROGRAM

## 2021-08-29 PROCEDURE — 94003 VENT MGMT INPAT SUBQ DAY: CPT

## 2021-08-29 PROCEDURE — 6370000000 HC RX 637 (ALT 250 FOR IP): Performed by: STUDENT IN AN ORGANIZED HEALTH CARE EDUCATION/TRAINING PROGRAM

## 2021-08-29 PROCEDURE — 3600000030 HC TRACHEOSTOMY: Performed by: SURGERY

## 2021-08-29 PROCEDURE — 0B110F4 BYPASS TRACHEA TO CUTANEOUS WITH TRACHEOSTOMY DEVICE, OPEN APPROACH: ICD-10-PCS | Performed by: SURGERY

## 2021-08-29 PROCEDURE — 83735 ASSAY OF MAGNESIUM: CPT

## 2021-08-29 PROCEDURE — 2580000003 HC RX 258: Performed by: NEUROLOGICAL SURGERY

## 2021-08-29 PROCEDURE — 84100 ASSAY OF PHOSPHORUS: CPT

## 2021-08-29 PROCEDURE — 95714 VEEG EA 12-26 HR UNMNTR: CPT

## 2021-08-29 PROCEDURE — 6360000002 HC RX W HCPCS: Performed by: NEUROLOGICAL SURGERY

## 2021-08-29 PROCEDURE — 3700000001 HC ADD 15 MINUTES (ANESTHESIA): Performed by: SURGERY

## 2021-08-29 PROCEDURE — 87070 CULTURE OTHR SPECIMN AEROBIC: CPT

## 2021-08-29 PROCEDURE — 2500000003 HC RX 250 WO HCPCS: Performed by: REGISTERED NURSE

## 2021-08-29 PROCEDURE — 2580000003 HC RX 258: Performed by: NURSE ANESTHETIST, CERTIFIED REGISTERED

## 2021-08-29 PROCEDURE — 6370000000 HC RX 637 (ALT 250 FOR IP): Performed by: SURGERY

## 2021-08-29 PROCEDURE — 82803 BLOOD GASES ANY COMBINATION: CPT

## 2021-08-29 PROCEDURE — 2700000000 HC OXYGEN THERAPY PER DAY

## 2021-08-29 PROCEDURE — 3700000000 HC ANESTHESIA ATTENDED CARE: Performed by: SURGERY

## 2021-08-29 PROCEDURE — 70450 CT HEAD/BRAIN W/O DYE: CPT

## 2021-08-29 PROCEDURE — 87205 SMEAR GRAM STAIN: CPT

## 2021-08-29 PROCEDURE — 6360000002 HC RX W HCPCS: Performed by: STUDENT IN AN ORGANIZED HEALTH CARE EDUCATION/TRAINING PROGRAM

## 2021-08-29 PROCEDURE — 80048 BASIC METABOLIC PNL TOTAL CA: CPT

## 2021-08-29 PROCEDURE — 84295 ASSAY OF SERUM SODIUM: CPT

## 2021-08-29 PROCEDURE — 89051 BODY FLUID CELL COUNT: CPT

## 2021-08-29 PROCEDURE — 84157 ASSAY OF PROTEIN OTHER: CPT

## 2021-08-29 PROCEDURE — 85025 COMPLETE CBC W/AUTO DIFF WBC: CPT

## 2021-08-29 PROCEDURE — 2500000003 HC RX 250 WO HCPCS: Performed by: STUDENT IN AN ORGANIZED HEALTH CARE EDUCATION/TRAINING PROGRAM

## 2021-08-29 PROCEDURE — 82947 ASSAY GLUCOSE BLOOD QUANT: CPT

## 2021-08-29 PROCEDURE — 6360000002 HC RX W HCPCS: Performed by: NURSE ANESTHETIST, CERTIFIED REGISTERED

## 2021-08-29 PROCEDURE — 99291 CRITICAL CARE FIRST HOUR: CPT | Performed by: NEUROLOGICAL SURGERY

## 2021-08-29 PROCEDURE — APPSS45 APP SPLIT SHARED TIME 31-45 MINUTES: Performed by: REGISTERED NURSE

## 2021-08-29 PROCEDURE — 93970 EXTREMITY STUDY: CPT

## 2021-08-29 PROCEDURE — 82945 GLUCOSE OTHER FLUID: CPT

## 2021-08-29 PROCEDURE — 2000000000 HC ICU R&B

## 2021-08-29 PROCEDURE — 2709999900 HC NON-CHARGEABLE SUPPLY: Performed by: SURGERY

## 2021-08-29 PROCEDURE — 95720 EEG PHY/QHP EA INCR W/VEEG: CPT | Performed by: PSYCHIATRY & NEUROLOGY

## 2021-08-29 PROCEDURE — 83605 ASSAY OF LACTIC ACID: CPT

## 2021-08-29 PROCEDURE — 2500000003 HC RX 250 WO HCPCS: Performed by: NURSE ANESTHETIST, CERTIFIED REGISTERED

## 2021-08-29 PROCEDURE — 84145 PROCALCITONIN (PCT): CPT

## 2021-08-29 PROCEDURE — 37799 UNLISTED PX VASCULAR SURGERY: CPT

## 2021-08-29 RX ORDER — MIDAZOLAM HYDROCHLORIDE 1 MG/ML
INJECTION INTRAMUSCULAR; INTRAVENOUS PRN
Status: DISCONTINUED | OUTPATIENT
Start: 2021-08-29 | End: 2021-08-29 | Stop reason: SDUPTHER

## 2021-08-29 RX ORDER — PROPRANOLOL HYDROCHLORIDE 10 MG/1
10 TABLET ORAL 3 TIMES DAILY
Status: DISCONTINUED | OUTPATIENT
Start: 2021-08-29 | End: 2021-08-30

## 2021-08-29 RX ORDER — PROPOFOL 10 MG/ML
INJECTION, EMULSION INTRAVENOUS CONTINUOUS PRN
Status: DISCONTINUED | OUTPATIENT
Start: 2021-08-29 | End: 2021-08-29 | Stop reason: SDUPTHER

## 2021-08-29 RX ORDER — ROCURONIUM BROMIDE 10 MG/ML
INJECTION, SOLUTION INTRAVENOUS PRN
Status: DISCONTINUED | OUTPATIENT
Start: 2021-08-29 | End: 2021-08-29 | Stop reason: SDUPTHER

## 2021-08-29 RX ORDER — MAGNESIUM SULFATE 1 G/100ML
3000 INJECTION INTRAVENOUS
Status: COMPLETED | OUTPATIENT
Start: 2021-08-29 | End: 2021-08-29

## 2021-08-29 RX ORDER — PHENYLEPHRINE HCL IN 0.9% NACL 0.5 MG/5ML
SYRINGE (ML) INTRAVENOUS PRN
Status: DISCONTINUED | OUTPATIENT
Start: 2021-08-29 | End: 2021-08-29 | Stop reason: SDUPTHER

## 2021-08-29 RX ORDER — SODIUM CHLORIDE, SODIUM LACTATE, POTASSIUM CHLORIDE, CALCIUM CHLORIDE 600; 310; 30; 20 MG/100ML; MG/100ML; MG/100ML; MG/100ML
INJECTION, SOLUTION INTRAVENOUS CONTINUOUS PRN
Status: DISCONTINUED | OUTPATIENT
Start: 2021-08-29 | End: 2021-08-29 | Stop reason: SDUPTHER

## 2021-08-29 RX ORDER — PROPOFOL 10 MG/ML
INJECTION, EMULSION INTRAVENOUS PRN
Status: DISCONTINUED | OUTPATIENT
Start: 2021-08-29 | End: 2021-08-29 | Stop reason: SDUPTHER

## 2021-08-29 RX ORDER — FENTANYL CITRATE 50 UG/ML
INJECTION, SOLUTION INTRAMUSCULAR; INTRAVENOUS PRN
Status: DISCONTINUED | OUTPATIENT
Start: 2021-08-29 | End: 2021-08-29 | Stop reason: SDUPTHER

## 2021-08-29 RX ORDER — ULTRASOUND COUPLING MEDIUM
GEL (GRAM) TOPICAL PRN
Status: DISCONTINUED | OUTPATIENT
Start: 2021-08-29 | End: 2021-08-29 | Stop reason: ALTCHOICE

## 2021-08-29 RX ADMIN — GABAPENTIN 300 MG: 300 CAPSULE ORAL at 08:30

## 2021-08-29 RX ADMIN — PROPOFOL 30 MG: 10 INJECTION, EMULSION INTRAVENOUS at 22:33

## 2021-08-29 RX ADMIN — LEVETIRACETAM 1000 MG: 500 SOLUTION ORAL at 20:13

## 2021-08-29 RX ADMIN — FENTANYL CITRATE 100 MCG: 50 INJECTION, SOLUTION INTRAMUSCULAR; INTRAVENOUS at 22:23

## 2021-08-29 RX ADMIN — MAGNESIUM SULFATE HEPTAHYDRATE 1000 MG: 1 INJECTION, SOLUTION INTRAVENOUS at 13:09

## 2021-08-29 RX ADMIN — CHLORHEXIDINE GLUCONATE 0.12% ORAL RINSE 15 ML: 1.2 LIQUID ORAL at 08:36

## 2021-08-29 RX ADMIN — ACETAMINOPHEN 1000 MG: 500 TABLET ORAL at 20:13

## 2021-08-29 RX ADMIN — METHOCARBAMOL TABLETS 750 MG: 750 TABLET, COATED ORAL at 04:45

## 2021-08-29 RX ADMIN — PROPRANOLOL HYDROCHLORIDE 10 MG: 10 TABLET ORAL at 20:13

## 2021-08-29 RX ADMIN — MAGNESIUM SULFATE HEPTAHYDRATE 1000 MG: 1 INJECTION, SOLUTION INTRAVENOUS at 11:48

## 2021-08-29 RX ADMIN — CHLORHEXIDINE GLUCONATE 0.12% ORAL RINSE 15 ML: 1.2 LIQUID ORAL at 20:14

## 2021-08-29 RX ADMIN — ACETAMINOPHEN 1000 MG: 500 TABLET ORAL at 13:56

## 2021-08-29 RX ADMIN — PROPOFOL 20 MG: 10 INJECTION, EMULSION INTRAVENOUS at 22:42

## 2021-08-29 RX ADMIN — ROCURONIUM BROMIDE 50 MG: 10 INJECTION INTRAVENOUS at 22:15

## 2021-08-29 RX ADMIN — MANNITOL 20 G: 20 INJECTION, SOLUTION INTRAVENOUS at 22:39

## 2021-08-29 RX ADMIN — FAMOTIDINE 20 MG: 20 TABLET, FILM COATED ORAL at 08:30

## 2021-08-29 RX ADMIN — SODIUM PHOSPHATE, MONOBASIC, MONOHYDRATE 10 MMOL: 276; 142 INJECTION, SOLUTION INTRAVENOUS at 13:03

## 2021-08-29 RX ADMIN — LEVETIRACETAM 1000 MG: 500 SOLUTION ORAL at 08:30

## 2021-08-29 RX ADMIN — PROPOFOL 50 MG: 10 INJECTION, EMULSION INTRAVENOUS at 22:36

## 2021-08-29 RX ADMIN — MIDAZOLAM HYDROCHLORIDE 2 MG: 1 INJECTION, SOLUTION INTRAMUSCULAR; INTRAVENOUS at 22:32

## 2021-08-29 RX ADMIN — SODIUM CHLORIDE, POTASSIUM CHLORIDE, SODIUM LACTATE AND CALCIUM CHLORIDE: 600; 310; 30; 20 INJECTION, SOLUTION INTRAVENOUS at 22:13

## 2021-08-29 RX ADMIN — Medication 150 MCG: at 22:44

## 2021-08-29 RX ADMIN — PROPRANOLOL HYDROCHLORIDE 10 MG: 10 TABLET ORAL at 13:56

## 2021-08-29 RX ADMIN — CEFAZOLIN 2000 MG: 10 INJECTION, POWDER, FOR SOLUTION INTRAVENOUS at 01:48

## 2021-08-29 RX ADMIN — PROPOFOL 50 MG: 10 INJECTION, EMULSION INTRAVENOUS at 22:40

## 2021-08-29 RX ADMIN — PROPOFOL 50 MG: 10 INJECTION, EMULSION INTRAVENOUS at 22:31

## 2021-08-29 RX ADMIN — SODIUM CHLORIDE, PRESERVATIVE FREE 10 ML: 5 INJECTION INTRAVENOUS at 20:14

## 2021-08-29 RX ADMIN — PROPOFOL 50 MG: 10 INJECTION, EMULSION INTRAVENOUS at 22:34

## 2021-08-29 RX ADMIN — PROPOFOL 150 MCG/KG/MIN: 10 INJECTION, EMULSION INTRAVENOUS at 22:38

## 2021-08-29 RX ADMIN — ENOXAPARIN SODIUM 30 MG: 30 INJECTION SUBCUTANEOUS at 12:58

## 2021-08-29 RX ADMIN — FAMOTIDINE 20 MG: 20 TABLET, FILM COATED ORAL at 20:13

## 2021-08-29 RX ADMIN — CEFAZOLIN 2000 MG: 10 INJECTION, POWDER, FOR SOLUTION INTRAVENOUS at 08:29

## 2021-08-29 RX ADMIN — GABAPENTIN 300 MG: 300 CAPSULE ORAL at 00:45

## 2021-08-29 RX ADMIN — Medication 150 MCG: at 22:37

## 2021-08-29 RX ADMIN — SODIUM CHLORIDE, PRESERVATIVE FREE 10 ML: 5 INJECTION INTRAVENOUS at 08:12

## 2021-08-29 RX ADMIN — PROPOFOL 50 MG: 10 INJECTION, EMULSION INTRAVENOUS at 22:38

## 2021-08-29 RX ADMIN — METHOCARBAMOL TABLETS 750 MG: 750 TABLET, COATED ORAL at 10:24

## 2021-08-29 RX ADMIN — SODIUM CHLORIDE 15 ML/HR: 3 INJECTION, SOLUTION INTRAVENOUS at 06:08

## 2021-08-29 RX ADMIN — ACETAMINOPHEN 1000 MG: 500 TABLET ORAL at 06:20

## 2021-08-29 RX ADMIN — MAGNESIUM SULFATE HEPTAHYDRATE 1000 MG: 1 INJECTION, SOLUTION INTRAVENOUS at 14:25

## 2021-08-29 ASSESSMENT — PULMONARY FUNCTION TESTS
PIF_VALUE: 25
PIF_VALUE: 1
PIF_VALUE: 22
PIF_VALUE: 23
PIF_VALUE: 20
PIF_VALUE: 1
PIF_VALUE: 0
PIF_VALUE: 20
PIF_VALUE: 0
PIF_VALUE: 29
PIF_VALUE: 0
PIF_VALUE: 20
PIF_VALUE: 12
PIF_VALUE: 0
PIF_VALUE: 23
PIF_VALUE: 1
PIF_VALUE: 25
PIF_VALUE: 20
PIF_VALUE: 24
PIF_VALUE: 24
PIF_VALUE: 0
PIF_VALUE: 21
PIF_VALUE: 27
PIF_VALUE: 1
PIF_VALUE: 19
PIF_VALUE: 0
PIF_VALUE: 1
PIF_VALUE: 24
PIF_VALUE: 23
PIF_VALUE: 16
PIF_VALUE: 21
PIF_VALUE: 21
PIF_VALUE: 20
PIF_VALUE: 0
PIF_VALUE: 20
PIF_VALUE: 21
PIF_VALUE: 0
PIF_VALUE: 23
PIF_VALUE: 24
PIF_VALUE: 0
PIF_VALUE: 19
PIF_VALUE: 0
PIF_VALUE: 1
PIF_VALUE: 23
PIF_VALUE: 1
PIF_VALUE: 17
PIF_VALUE: 0
PIF_VALUE: 1
PIF_VALUE: 0
PIF_VALUE: 1
PIF_VALUE: 21
PIF_VALUE: 24
PIF_VALUE: 1
PIF_VALUE: 1
PIF_VALUE: 20
PIF_VALUE: 20
PIF_VALUE: 0
PIF_VALUE: 21

## 2021-08-29 NOTE — PROCEDURES
This gentleman to aeroallergens and Lyrica 30 LONG-TERM EEG-VIDEO [address] [facility] [address]    Patient: Jose Martel  Age: 38 y.o. MRN: [id number]    Referring Physician: No ref. provider found  History: The patient is a 38 y.o. female who presented breakthrough seizure/encephalopathy. This long-term video-EEG monitoring study was performed to determine the nature of the patient's clinical events. The patient is on neuroactive medications.    Adanrlene Delonte   Current Facility-Administered Medications   Medication Dose Route Frequency Provider Last Rate Last Admin    sodium chloride 3 % solution  25 mL/hr IntraVENous Continuous Jase Hopper, DO 15 mL/hr at 08/29/21 0608 15 mL/hr at 08/29/21 0608    mannitol 20 % IVPB 50 g  50 g IntraVENous Once [facility], APRN - CNP   Held at 08/27/21 1133    ceFAZolin (ANCEF) 2000 mg in dextrose 5 % 50 mL IVPB  2,000 mg IntraVENous Q8H [facility], DO   Stopped at 08/29/21 0092    propofol injection  5-50 mcg/kg/min IntraVENous Titrated Beth Arnaud, DO   Stopped at 08/28/21 0630    fentaNYL 20 mcg/mL Infusion  12.5-200 mcg/hr IntraVENous Continuous Alicia Solis MD   Stopped at 08/28/21 0630    oxyCODONE (ROXICODONE) immediate release tablet 5 mg  5 mg Oral Q6H PRN Beth [place], DO   5 mg at 08/28/21 0150    norepinephrine (LEVOPHED) 16 mg in sodium chloride 0.9 % 250 mL infusion  2-100 mcg/min IntraVENous Continuous Alicia Solis MD   Stopped at 08/28/21 0640    famotidine (PEPCID) tablet 20 mg  20 mg Per NG tube BID Beth [place], DO   20 mg at 08/29/21 0830    levETIRAcetam (KEPPRA) 100 MG/ML solution 1,000 mg  1,000 mg Oral BID Beth Arnaud, DO   1,000 mg at 08/29/21 0830    chlorhexidine (PERIDEX) 0.12 % solution 15 mL  15 mL Mouth/Throat BID Beth [place], DO   15 mL at 08/29/21 0836    sodium chloride flush 0.9 % injection 5-40 mL  5-40 mL IntraVENous 2 times per day Beth Arnaud, DO   10 mL at 08/29/21 0637    sodium chloride flush 0.9 % injection 5-40 mL  5-40 mL IntraVENous PRN Beth Arnaud, DO        0.9 % sodium chloride infusion  25 mL IntraVENous PRN Beth Bradley, DO        ondansetron (ZOFRAN-ODT) disintegrating tablet 4 mg  4 mg Oral Q8H PRN Beth Bradley, DO        Or    ondansetron (ZOFRAN) injection 4 mg  4 mg IntraVENous Q6H PRN Beth Bradley, DO        polyethylene glycol (GLYCOLAX) packet 17 g  17 g Oral Daily Beth Arnaud, DO   17 g at 08/28/21 0837    bisacodyl (DULCOLAX) suppository 10 mg  10 mg Rectal Daily Beth Bradley, DO   10 mg at 08/28/21 0837    0.9 % sodium chloride infusion   IntraVENous Continuous Mertie Ditto, APRN - CNP 10 mL/hr at 08/27/21 1040 Rate Change at 08/27/21 1040    acetaminophen (TYLENOL) tablet 1,000 mg  1,000 mg Oral 3 times per day Beth Arnaud, DO   1,000 mg at 08/29/21 0620    gabapentin (NEURONTIN) capsule 300 mg  300 mg Oral Q8H Beth Arnaud, DO   300 mg at 08/29/21 0830    methocarbamol (ROBAXIN) tablet 750 mg  750 mg Oral Q6H Beth Bradley, DO   750 mg at 08/29/21 0445     Technical Description: This is a 21-channel digital EEG recording with time-locked video. Electrodes were placed in accordance with the 10-20 International System of Electrode Placement. Single lead EKG monitoring was included. Baseline EEG Recording:  A formal baseline EEG recording was not obtained. Day 1 - 8/27/21, starting at 1433    Interictal EEG Samples: The background activity consisted of 4 to 5 Hz of polymorphic delta and theta activity of 15 to 20 µV. There was poor anterior posterior amplitude gradient. Frequent periods of diffuse attenuation were seen lasting for up to 3 to 4 seconds. The background appears symmetric. Normal wakeful pattern and normal sleep architecture was not seen. The EKG channel revealed no abnormalities.     Ictal EEG Recording / Patient Events: During this period the patient had no events or seizures. Summary: During this day of recording no events were recorded. The interictal EEG was abnormal due to diffuse polymorphic delta theta slowing and periods of diffuse attenuation suggesting severe encephalopathy. No abnormal epileptiform activity was seen. Monitoring was continued in order to record the patient's typical events. The EKG channel revealed no abnormalities. Day 2 - 8/28/21    Interictal EEG Samples: As the recording progressed, the background appeared more continuous. Ictal EEG Recording / Patient Events: During this period the patient had no events or seizures. Summary: During this day of recording no events were recorded. The interictal EEG was abnormal due to diffuse polymorphic delta and theta slowing suggesting severe encephalopathy, minimally improved from yesterday. No abnormal epileptiform activity was seen. Monitoring was continued in order to record the patients typical events. The EKG channel revealed no abnormalities. Day 3 - 8/29/21, reviewed through 7:30 am    Interictal EEG Samples: Interictal EEG was unchanged from yesterday. Ictal EEG Recording / Patient Events: During this period the patient had no events or seizures. Summary: During this day of recording no events were recorded. The interictal EEG was abnormal due to diffuse polymorphic delta and theta slowing suggesting severe encephalopathy, unchanged from yesterday. Monitoring was continued in order to record the patients typical events. The EKG channel revealed no abnormalities. Zayda Kulkarni MD  Diplomate, American Board of Psychiatry and Neurology  Diplomate, American Board of Clinical Neurophysiology  Diplomate, American Board of Epilepsy         Please note this is a preliminary report and updated daily. The final report will have a summary of behavior and electrographic findings with clinical correlation.

## 2021-08-29 NOTE — PROGRESS NOTES
Gly and seno  DVT: Lovenox  GLYCEMIC CONTROL: not required   HOB >45: Yes       SUBJECTIVE    Silva Alvarez  Was seen and examined this morning. She has remained off all sedation and pressor support. UOP is adequate, having frequent BM. EVD still in place. OBJECTIVE  VITALS: Temp: Temp: 100.6 °F (38.1 °C)Temp  Av.3 °F (38.5 °C)  Min: 100.4 °F (38 °C)  Max: 101.8 °F (86.1 °C) BP Systolic (43BZK), CTL:087 , Min:100 , QSS:246   Diastolic (07TSV), MDL:06, Min:47, Max:59   Pulse Pulse  Av.8  Min: 64  Max: 110 Resp Resp  Av.5  Min: 10  Max: 42 Pulse ox SpO2  Av.1 %  Min: 95 %  Max: 100 %    GENERAL: Intubated, non sedation, not following commands    NEURO: off sedation, withdrawals to pain . HEENT: Bilateral eye edema limiting pupillary exam, left worse than right. Staples intact to scalp with bolt in place, trachea midline, left lateral eye laceration repaired with chromic   : bourgeois in place   LUNGS: normal effort on mechanical vent, no resp distress, no accessory muscle use   HEART: normal rate and regular rhythm  ABDOMEN: soft, non-tender, non-distended   EXTREMITY: no cyanosis or clubbing. Bilateral hand edema present . Bruising to bl knees      LAB:  CBC:   Recent Labs     21  0603 21  0407 21  0439   WBC 18.4* 14.2* 14.8*   HGB 10.0* 9.6* 8.9*   HCT 30.5* 29.2* 27.4*   MCV 87.1 87.4 87.3    231 241     BMP:   Recent Labs     21  0603 21  0920 21  0407 21  0939 21  1507 21  2129 21  0439   *   < > 143   < > 147* 145* 144   K 3.6*  --  3.5*  --   --   --  3.8   *  --  114*  --   --   --  113*   CO2 22  --  22  --   --   --  22   BUN 12  --  16  --   --   --  16   CREATININE 0.35*  --  0.31*  --   --   --  0.32*   GLUCOSE 123*  --  99  --   --   --  126*    < > = values in this interval not displayed.          RADIOLOGY:  CXR:   Impression   Increased lung markings at the right infrahilar region, may be related to   atelectasis versus pneumonia.            Gus Lopez DO  8/29/21, 7:42 AM

## 2021-08-29 NOTE — PLAN OF CARE
Problem: OXYGENATION/RESPIRATORY FUNCTION  Goal: Patient will maintain patent airway  8/28/2021 2041 by Susan Campbell RCP  Outcome: Ongoing     Problem: OXYGENATION/RESPIRATORY FUNCTION  Goal: Patient will achieve/maintain normal respiratory rate/effort  Description: Respiratory rate and effort will be within normal limits for the patient  8/28/2021 2041 by Susan Campbell RCP  Outcome: Ongoing     Problem: MECHANICAL VENTILATION  Goal: Patient will maintain patent airway  8/28/2021 2041 by Susan Campbell RCP  Outcome: Ongoing     Problem: MECHANICAL VENTILATION  Goal: Oral health is maintained or improved  8/28/2021 2041 by Susan Campbell RCP  Outcome: Ongoing     Problem: MECHANICAL VENTILATION  Goal: ET tube will be managed safely  8/28/2021 2041 by Susan Campbell RCP  Outcome: Ongoing     Problem: MECHANICAL VENTILATION  Goal: Ability to express needs and understand communication  8/28/2021 2041 by Susan Campbell RCP  Outcome: Ongoing     Problem: MECHANICAL VENTILATION  Goal: Mobility/activity is maintained at optimum level for patient  8/28/2021 2041 by Susan Campbell RCP  Outcome: Ongoing

## 2021-08-29 NOTE — PLAN OF CARE
Problem: OXYGENATION/RESPIRATORY FUNCTION  Goal: Patient will maintain patent airway  8/29/2021 1133 by Rubens Madison RN  Outcome: Ongoing  8/29/2021 0932 by Eric Wilson RCP  Outcome: Ongoing  Goal: Patient will achieve/maintain normal respiratory rate/effort  Description: Respiratory rate and effort will be within normal limits for the patient  8/29/2021 1133 by Rubens Madison RN  Outcome: Ongoing  8/29/2021 0932 by Eric Wilson RCP  Outcome: Ongoing  Note:   PROVIDE ADEQUATE OXYGENATION WITH ACCEPTABLE SP02/ABG'S    [x]  IDENTIFY APPROPRIATE OXYGEN THERAPY  [x]   MONITOR SP02/ABG'S AS NEEDED   [x]   PATIENT EDUCATION AS NEEDED        Problem: MECHANICAL VENTILATION  Goal: Patient will maintain patent airway  8/29/2021 1133 by Rubens Madison RN  Outcome: Ongoing  8/29/2021 0932 by Eric Wilson RCP  Outcome: Ongoing  Note: MECHANICAL VENTILATION     [x]  PROVIDE OPTIMAL VENTILATION  [x]   ASSESS FOR EXTUBATION READINESS  [x]   ASSESS FOR WEANING READINESS  [x]  EXTUBATE AS TOLERATED  []  IMPLEMENT ADULT MECHANICAL VENTILATION PROTOCOL  [x]  MAINTAIN ADEQUATE OXYGENATION  [x]  PERFORM SPONTANEOUS WEANING TRIAL AS TOLERATED     Goal: Oral health is maintained or improved  8/29/2021 1133 by Rubens Madison RN  Outcome: Ongoing  8/29/2021 0932 by Eric Wilson RCP  Outcome: Ongoing  Goal: ET tube will be managed safely  8/29/2021 1133 by Rubens Madison RN  Outcome: Ongoing  8/29/2021 0932 by Eric Wilson RCP  Outcome: Ongoing  Goal: Ability to express needs and understand communication  8/29/2021 1133 by Rubens Madison RN  Outcome: Ongoing  8/29/2021 0932 by Eric Wilson RCP  Outcome: Ongoing  Goal: Mobility/activity is maintained at optimum level for patient  8/29/2021 1133 by Rubens Madison RN  Outcome: Ongoing  8/29/2021 0932 by Eric Wilson RCP  Outcome: Ongoing     Problem: SKIN INTEGRITY  Goal: Skin integrity is maintained or improved  8/29/2021 1133 by Rubens Madison RN  Outcome: Ongoing  8/29/2021 0932 by Kane Gold RCP  Outcome: Ongoing     Problem: Confusion - Acute:  Goal: Absence of continued neurological deterioration signs and symptoms  Description: Absence of continued neurological deterioration signs and symptoms  Outcome: Ongoing  Goal: Mental status will be restored to baseline  Description: Mental status will be restored to baseline  Outcome: Ongoing     Problem: Discharge Planning:  Goal: Ability to perform activities of daily living will improve  Description: Ability to perform activities of daily living will improve  Outcome: Ongoing  Goal: Participates in care planning  Description: Participates in care planning  Outcome: Ongoing     Problem: Injury - Risk of, Physical Injury:  Goal: Absence of physical injury  Description: Absence of physical injury  Outcome: Ongoing  Goal: Will remain free from falls  Description: Will remain free from falls  Outcome: Ongoing     Problem: Mood - Altered:  Goal: Mood stable  Description: Mood stable  Outcome: Ongoing  Goal: Absence of abusive behavior  Description: Absence of abusive behavior  Outcome: Ongoing  Goal: Verbalizations of feeling emotionally comfortable while being cared for will increase  Description: Verbalizations of feeling emotionally comfortable while being cared for will increase  Outcome: Ongoing     Problem: Psychomotor Activity - Altered:  Goal: Absence of psychomotor disturbance signs and symptoms  Description: Absence of psychomotor disturbance signs and symptoms  Outcome: Ongoing     Problem: Sensory Perception - Impaired:  Goal: Demonstrations of improved sensory functioning will increase  Description: Demonstrations of improved sensory functioning will increase  Outcome: Ongoing  Goal: Decrease in sensory misperception frequency  Description: Decrease in sensory misperception frequency  Outcome: Ongoing  Goal: Able to refrain from responding to false sensory perceptions  Description: Able to refrain from responding to false sensory perceptions  Outcome: Ongoing  Goal: Demonstrates accurate environmental perceptions  Description: Demonstrates accurate environmental perceptions  Outcome: Ongoing  Goal: Able to distinguish between reality-based and nonreality-based thinking  Description: Able to distinguish between reality-based and nonreality-based thinking  Outcome: Ongoing  Goal: Able to interrupt nonreality-based thinking  Description: Able to interrupt nonreality-based thinking  Outcome: Ongoing     Problem: Sleep Pattern Disturbance:  Goal: Appears well-rested  Description: Appears well-rested  Outcome: Ongoing     Problem: Skin Integrity:  Goal: Will show no infection signs and symptoms  Description: Will show no infection signs and symptoms  Outcome: Ongoing  Goal: Absence of new skin breakdown  Description: Absence of new skin breakdown  Outcome: Ongoing     Problem: Falls - Risk of:  Goal: Absence of physical injury  Description: Absence of physical injury  Outcome: Ongoing  Goal: Will remain free from falls  Description: Will remain free from falls  Outcome: Ongoing     Problem: Non-Violent Restraints  Goal: Removal from restraints as soon as assessed to be safe  Outcome: Ongoing  Goal: No harm/injury to patient while restraints in use  Outcome: Ongoing  Goal: Patient's dignity will be maintained  Outcome: Ongoing     Problem: Nutrition  Goal: Optimal nutrition therapy  Outcome: Ongoing

## 2021-08-30 ENCOUNTER — APPOINTMENT (OUTPATIENT)
Dept: GENERAL RADIOLOGY | Age: 36
DRG: 003 | End: 2021-08-30
Payer: COMMERCIAL

## 2021-08-30 LAB
ABSOLUTE EOS #: 0.17 K/UL (ref 0–0.44)
ABSOLUTE EOS #: 0.2 K/UL (ref 0–0.44)
ABSOLUTE IMMATURE GRANULOCYTE: 0.08 K/UL (ref 0–0.3)
ABSOLUTE IMMATURE GRANULOCYTE: 0.1 K/UL (ref 0–0.3)
ABSOLUTE LYMPH #: 1.35 K/UL (ref 1.1–3.7)
ABSOLUTE LYMPH #: 1.54 K/UL (ref 1.1–3.7)
ABSOLUTE MONO #: 0.9 K/UL (ref 0.1–1.2)
ABSOLUTE MONO #: 0.98 K/UL (ref 0.1–1.2)
ALLEN TEST: ABNORMAL
ANION GAP SERPL CALCULATED.3IONS-SCNC: 10 MMOL/L (ref 9–17)
ANION GAP SERPL CALCULATED.3IONS-SCNC: 11 MMOL/L (ref 9–17)
BASOPHILS # BLD: 0 % (ref 0–2)
BASOPHILS # BLD: 0 % (ref 0–2)
BASOPHILS ABSOLUTE: 0.05 K/UL (ref 0–0.2)
BASOPHILS ABSOLUTE: 0.06 K/UL (ref 0–0.2)
BUN BLDV-MCNC: 15 MG/DL (ref 6–20)
BUN BLDV-MCNC: 17 MG/DL (ref 6–20)
BUN/CREAT BLD: ABNORMAL (ref 9–20)
BUN/CREAT BLD: ABNORMAL (ref 9–20)
CALCIUM SERPL-MCNC: 8.1 MG/DL (ref 8.6–10.4)
CALCIUM SERPL-MCNC: 8.1 MG/DL (ref 8.6–10.4)
CHLORIDE BLD-SCNC: 110 MMOL/L (ref 98–107)
CHLORIDE BLD-SCNC: 110 MMOL/L (ref 98–107)
CO2: 21 MMOL/L (ref 20–31)
CO2: 22 MMOL/L (ref 20–31)
CREAT SERPL-MCNC: 0.28 MG/DL (ref 0.5–0.9)
CREAT SERPL-MCNC: 0.32 MG/DL (ref 0.5–0.9)
DIFFERENTIAL TYPE: ABNORMAL
DIFFERENTIAL TYPE: ABNORMAL
EOSINOPHILS RELATIVE PERCENT: 1 % (ref 1–4)
EOSINOPHILS RELATIVE PERCENT: 1 % (ref 1–4)
FIO2: 30
GFR AFRICAN AMERICAN: >60 ML/MIN
GFR AFRICAN AMERICAN: >60 ML/MIN
GFR NON-AFRICAN AMERICAN: >60 ML/MIN
GFR NON-AFRICAN AMERICAN: >60 ML/MIN
GFR SERPL CREATININE-BSD FRML MDRD: ABNORMAL ML/MIN/{1.73_M2}
GLUCOSE BLD-MCNC: 109 MG/DL (ref 70–99)
GLUCOSE BLD-MCNC: 111 MG/DL (ref 74–100)
GLUCOSE BLD-MCNC: 114 MG/DL (ref 70–99)
HCT VFR BLD CALC: 26 % (ref 36.3–47.1)
HCT VFR BLD CALC: 27 % (ref 36.3–47.1)
HEMOGLOBIN: 8.4 G/DL (ref 11.9–15.1)
HEMOGLOBIN: 8.8 G/DL (ref 11.9–15.1)
IMMATURE GRANULOCYTES: 1 %
IMMATURE GRANULOCYTES: 1 %
LYMPHOCYTES # BLD: 10 % (ref 24–43)
LYMPHOCYTES # BLD: 9 % (ref 24–43)
MAGNESIUM: 2 MG/DL (ref 1.6–2.6)
MAGNESIUM: 2.2 MG/DL (ref 1.6–2.6)
MCH RBC QN AUTO: 28.4 PG (ref 25.2–33.5)
MCH RBC QN AUTO: 28.5 PG (ref 25.2–33.5)
MCHC RBC AUTO-ENTMCNC: 32.3 G/DL (ref 28.4–34.8)
MCHC RBC AUTO-ENTMCNC: 32.6 G/DL (ref 28.4–34.8)
MCV RBC AUTO: 87.4 FL (ref 82.6–102.9)
MCV RBC AUTO: 87.8 FL (ref 82.6–102.9)
MODE: ABNORMAL
MONOCYTES # BLD: 6 % (ref 3–12)
MONOCYTES # BLD: 7 % (ref 3–12)
NEGATIVE BASE EXCESS, ART: ABNORMAL (ref 0–2)
NRBC AUTOMATED: 0 PER 100 WBC
NRBC AUTOMATED: 0 PER 100 WBC
O2 DEVICE/FLOW/%: ABNORMAL
PATIENT TEMP: 38.9
PDW BLD-RTO: 13.1 % (ref 11.8–14.4)
PDW BLD-RTO: 13.1 % (ref 11.8–14.4)
PHOSPHORUS: 2.9 MG/DL (ref 2.6–4.5)
PHOSPHORUS: 3.2 MG/DL (ref 2.6–4.5)
PLATELET # BLD: 249 K/UL (ref 138–453)
PLATELET # BLD: 285 K/UL (ref 138–453)
PLATELET ESTIMATE: ABNORMAL
PLATELET ESTIMATE: ABNORMAL
PMV BLD AUTO: 9.3 FL (ref 8.1–13.5)
PMV BLD AUTO: 9.4 FL (ref 8.1–13.5)
POC HCO3: 24.7 MMOL/L (ref 21–28)
POC LACTIC ACID: 0.8 MMOL/L (ref 0.56–1.39)
POC O2 SATURATION: 98 % (ref 94–98)
POC PCO2 TEMP: 37 MM HG
POC PCO2: 33.8 MM HG (ref 35–48)
POC PH TEMP: 7.44
POC PH: 7.47 (ref 7.35–7.45)
POC PO2 TEMP: 106 MM HG
POC PO2: 94.2 MM HG (ref 83–108)
POSITIVE BASE EXCESS, ART: 1 (ref 0–3)
POTASSIUM SERPL-SCNC: 3.8 MMOL/L (ref 3.7–5.3)
POTASSIUM SERPL-SCNC: 3.8 MMOL/L (ref 3.7–5.3)
PROCALCITONIN: 0.14 NG/ML
RBC # BLD: 2.96 M/UL (ref 3.95–5.11)
RBC # BLD: 3.09 M/UL (ref 3.95–5.11)
RBC # BLD: ABNORMAL 10*6/UL
RBC # BLD: ABNORMAL 10*6/UL
SAMPLE SITE: ABNORMAL
SEG NEUTROPHILS: 82 % (ref 36–65)
SEG NEUTROPHILS: 82 % (ref 36–65)
SEGMENTED NEUTROPHILS ABSOLUTE COUNT: 11.67 K/UL (ref 1.5–8.1)
SEGMENTED NEUTROPHILS ABSOLUTE COUNT: 12.48 K/UL (ref 1.5–8.1)
SODIUM BLD-SCNC: 141 MMOL/L (ref 135–144)
SODIUM BLD-SCNC: 143 MMOL/L (ref 135–144)
SODIUM BLD-SCNC: 143 MMOL/L (ref 135–144)
TCO2 (CALC), ART: ABNORMAL MMOL/L (ref 22–29)
WBC # BLD: 14.3 K/UL (ref 3.5–11.3)
WBC # BLD: 15.2 K/UL (ref 3.5–11.3)
WBC # BLD: ABNORMAL 10*3/UL
WBC # BLD: ABNORMAL 10*3/UL

## 2021-08-30 PROCEDURE — 2580000003 HC RX 258: Performed by: STUDENT IN AN ORGANIZED HEALTH CARE EDUCATION/TRAINING PROGRAM

## 2021-08-30 PROCEDURE — 85025 COMPLETE CBC W/AUTO DIFF WBC: CPT

## 2021-08-30 PROCEDURE — 94003 VENT MGMT INPAT SUBQ DAY: CPT

## 2021-08-30 PROCEDURE — 82803 BLOOD GASES ANY COMBINATION: CPT

## 2021-08-30 PROCEDURE — 6360000002 HC RX W HCPCS: Performed by: STUDENT IN AN ORGANIZED HEALTH CARE EDUCATION/TRAINING PROGRAM

## 2021-08-30 PROCEDURE — 95720 EEG PHY/QHP EA INCR W/VEEG: CPT | Performed by: PSYCHIATRY & NEUROLOGY

## 2021-08-30 PROCEDURE — 6370000000 HC RX 637 (ALT 250 FOR IP): Performed by: STUDENT IN AN ORGANIZED HEALTH CARE EDUCATION/TRAINING PROGRAM

## 2021-08-30 PROCEDURE — 99291 CRITICAL CARE FIRST HOUR: CPT | Performed by: NEUROLOGICAL SURGERY

## 2021-08-30 PROCEDURE — 94761 N-INVAS EAR/PLS OXIMETRY MLT: CPT

## 2021-08-30 PROCEDURE — 84295 ASSAY OF SERUM SODIUM: CPT

## 2021-08-30 PROCEDURE — 95718 EEG PHYS/QHP 2-12 HR W/VEEG: CPT | Performed by: PSYCHIATRY & NEUROLOGY

## 2021-08-30 PROCEDURE — 6370000000 HC RX 637 (ALT 250 FOR IP): Performed by: NURSE PRACTITIONER

## 2021-08-30 PROCEDURE — 95711 VEEG 2-12 HR UNMONITORED: CPT

## 2021-08-30 PROCEDURE — 2000000000 HC ICU R&B

## 2021-08-30 PROCEDURE — 84145 PROCALCITONIN (PCT): CPT

## 2021-08-30 PROCEDURE — 80048 BASIC METABOLIC PNL TOTAL CA: CPT

## 2021-08-30 PROCEDURE — 37799 UNLISTED PX VASCULAR SURGERY: CPT

## 2021-08-30 PROCEDURE — 84100 ASSAY OF PHOSPHORUS: CPT

## 2021-08-30 PROCEDURE — 71045 X-RAY EXAM CHEST 1 VIEW: CPT

## 2021-08-30 PROCEDURE — 82947 ASSAY GLUCOSE BLOOD QUANT: CPT

## 2021-08-30 PROCEDURE — APPSS45 APP SPLIT SHARED TIME 31-45 MINUTES: Performed by: REGISTERED NURSE

## 2021-08-30 PROCEDURE — 74018 RADEX ABDOMEN 1 VIEW: CPT

## 2021-08-30 PROCEDURE — 83735 ASSAY OF MAGNESIUM: CPT

## 2021-08-30 PROCEDURE — 83605 ASSAY OF LACTIC ACID: CPT

## 2021-08-30 PROCEDURE — 2700000000 HC OXYGEN THERAPY PER DAY

## 2021-08-30 RX ORDER — AMANTADINE HYDROCHLORIDE 50 MG/5ML
100 SOLUTION ORAL 2 TIMES DAILY
Status: DISCONTINUED | OUTPATIENT
Start: 2021-08-30 | End: 2021-09-22 | Stop reason: HOSPADM

## 2021-08-30 RX ORDER — PROPRANOLOL HYDROCHLORIDE 10 MG/1
10 TABLET ORAL 3 TIMES DAILY
Status: DISCONTINUED | OUTPATIENT
Start: 2021-08-30 | End: 2021-09-06

## 2021-08-30 RX ORDER — FENTANYL CITRATE 50 UG/ML
50 INJECTION, SOLUTION INTRAMUSCULAR; INTRAVENOUS ONCE
Status: COMPLETED | OUTPATIENT
Start: 2021-08-30 | End: 2021-08-30

## 2021-08-30 RX ORDER — MAGNESIUM SULFATE IN WATER 40 MG/ML
2000 INJECTION, SOLUTION INTRAVENOUS
Status: COMPLETED | OUTPATIENT
Start: 2021-08-30 | End: 2021-08-30

## 2021-08-30 RX ADMIN — FAMOTIDINE 20 MG: 20 TABLET, FILM COATED ORAL at 08:31

## 2021-08-30 RX ADMIN — POTASSIUM BICARBONATE 20 MEQ: 391 TABLET, EFFERVESCENT ORAL at 12:17

## 2021-08-30 RX ADMIN — LEVETIRACETAM 1000 MG: 500 SOLUTION ORAL at 08:31

## 2021-08-30 RX ADMIN — SODIUM CHLORIDE 1000 ML: 9 INJECTION, SOLUTION INTRAVENOUS at 04:52

## 2021-08-30 RX ADMIN — AMANTADINE HYDROCHLORIDE 100 MG: 50 SOLUTION ORAL at 14:18

## 2021-08-30 RX ADMIN — FAMOTIDINE 20 MG: 20 TABLET, FILM COATED ORAL at 22:15

## 2021-08-30 RX ADMIN — CHLORHEXIDINE GLUCONATE 0.12% ORAL RINSE 15 ML: 1.2 LIQUID ORAL at 22:15

## 2021-08-30 RX ADMIN — ACETAMINOPHEN 1000 MG: 500 TABLET ORAL at 08:04

## 2021-08-30 RX ADMIN — LEVETIRACETAM 1000 MG: 500 SOLUTION ORAL at 22:15

## 2021-08-30 RX ADMIN — ENOXAPARIN SODIUM 30 MG: 30 INJECTION SUBCUTANEOUS at 22:15

## 2021-08-30 RX ADMIN — MAGNESIUM SULFATE 2000 MG: 2 INJECTION INTRAVENOUS at 10:38

## 2021-08-30 RX ADMIN — CHLORHEXIDINE GLUCONATE 0.12% ORAL RINSE 15 ML: 1.2 LIQUID ORAL at 08:31

## 2021-08-30 RX ADMIN — PROPRANOLOL HYDROCHLORIDE 10 MG: 10 TABLET ORAL at 22:15

## 2021-08-30 RX ADMIN — ACETAMINOPHEN 1000 MG: 500 TABLET ORAL at 14:18

## 2021-08-30 RX ADMIN — ACETAMINOPHEN 1000 MG: 500 TABLET ORAL at 22:15

## 2021-08-30 RX ADMIN — SODIUM CHLORIDE, PRESERVATIVE FREE 10 ML: 5 INJECTION INTRAVENOUS at 10:38

## 2021-08-30 RX ADMIN — ENOXAPARIN SODIUM 30 MG: 30 INJECTION SUBCUTANEOUS at 08:31

## 2021-08-30 RX ADMIN — PROPRANOLOL HYDROCHLORIDE 10 MG: 10 TABLET ORAL at 14:18

## 2021-08-30 RX ADMIN — MAGNESIUM SULFATE 2000 MG: 2 INJECTION INTRAVENOUS at 08:32

## 2021-08-30 RX ADMIN — PROPRANOLOL HYDROCHLORIDE 10 MG: 10 TABLET ORAL at 08:31

## 2021-08-30 RX ADMIN — SODIUM CHLORIDE, PRESERVATIVE FREE 10 ML: 5 INJECTION INTRAVENOUS at 22:15

## 2021-08-30 RX ADMIN — FENTANYL CITRATE 50 MCG: 50 INJECTION, SOLUTION INTRAMUSCULAR; INTRAVENOUS at 04:52

## 2021-08-30 ASSESSMENT — PULMONARY FUNCTION TESTS
PIF_VALUE: 17
PIF_VALUE: 18
PIF_VALUE: 19
PIF_VALUE: 16
PIF_VALUE: 17
PIF_VALUE: 18
PIF_VALUE: 18

## 2021-08-30 NOTE — BRIEF OP NOTE
Brief Postoperative Note      Patient: Lauraine Snellen  YOB: 1985  MRN: [id number]    Date of Procedure: 8/29/2021    Pre-Op Diagnosis: RESPIRATORY FAILURE    Post-Op Diagnosis: Same       Procedure(s):  TRACHEOTOMY,    Surgeon(s):  Chandra Walls MD    Assistant:  Resident: Ambar Carvalho DO    Anesthesia: General    Estimated Blood Loss (mL): 3    Complications: None    Specimens:   * No specimens in log *    Implants:  * No implants in log *      Drains:   NG/OG/NJ/NE Tube Orogastric Center mouth (Active)   Surrounding Skin Dry; Intact 08/29/21 1600   Securement device Yes 08/29/21 1600   Status Suction-low intermittent 08/29/21 1630   Placement Verified by External Catheter Length;by Gastric Contents 08/29/21 1600   NG/OG/NJ/NE External Measurement (cm) 58 cm 08/29/21 1600   Drainage Appearance Tan 08/29/21 1600   Tube Feeding Immune Enhancing 08/29/21 1600   Tube Feeding Status Continuous 08/29/21 1600   Rate/Schedule 0 mL/hr 08/29/21 1630   Tube Feeding Intake (mL) 186 ml 08/29/21 1600   Free Water Flush (mL) 30 mL 08/29/21 1200   Residual Volume (ml) 0 ml 08/29/21 0400   Output (mL) 100 ml 08/27/21 0400       Ventriculostomy Left (Active)   Drain Status Clamped 08/29/21 1600   Drain Level (cm) 10 cm 08/29/21 1600   CSF Color Yellow 08/29/21 1600   Site Description Edema/Swelling 08/29/21 1600   Dressing Status Other (Comment) 08/29/21 1600   Output (ml) 15 ml 08/29/21 1830       Urethral Catheter Temperature probe (Active)   $ Urethral catheter insertion $ Not inserted for procedure 08/26/21 2100   Catheter Indications Need for fluid volume management of the critically ill patient in a critical care setting 08/29/21 2000   Securement Device Date Changed 08/26/21 08/29/21 2000   Site Assessment Urethral drainage 08/29/21 2000   Urine Color Yellow 08/29/21 2000   Urine Appearance Clear 08/29/21 2000   Output (mL) 125 mL 08/29/21 2000       Ventricular Device Ventricular drainage catheter with ICP microsensor Right (Active)   Site Description Edema/Swelling 08/29/21 1600   Dressing Status Other (Comment) 08/29/21 1600       [REMOVED] ICP monitor (Removed)   Status To Pressure Monitoring 08/28/21 1200   Dressing Status New drainage; Intact 08/28/21 1200   Dressing Type Split gauze 08/28/21 1200   Site Assessment Dry 08/28/21 1200   Drainage Blood Tinged 08/28/21 0400       [REMOVED] Ventriculostomy Right (Removed)       [REMOVED] Urethral Catheter (Removed)   Catheter Indications Need for fluid volume management of the critically ill patient in a critical care setting 08/25/21 1600   Site Assessment No urethral drainage 08/25/21 1600   Urine Color Yellow 08/25/21 1600   Urine Appearance Clear 08/25/21 1600   Output (mL) 20 mL 08/25/21 1600       [REMOVED] External Urinary Catheter (Removed)   Catheter changed  Yes 08/25/21 2000   Urine Color Yellow 08/26/21 2000   Urine Appearance Clear 08/26/21 2000   Suction 40 mmgHg continuous 08/26/21 2000   Placement Replaced 08/26/21 2000   Skin Assessment No Injury 08/26/21 2000       Findings: 8 Sonia rainey    Electronically signed by Raymond Nguyen DO on 8/29/2021 at 11:00 PM

## 2021-08-30 NOTE — PROGRESS NOTES
Physical Therapy         Physical Therapy Cancel Note      DATE: 2021    NAME: Maria Eugenia López  MRN: [id number]   : 1985      Patient not seen this date for Physical Therapy due to:    Patient is intubated, not sedated, not following commands. Will continue to pursue for mobility-readiness.       Electronically signed by Princess Marcano PT on 2021 at 3:26 PM

## 2021-08-30 NOTE — PLAN OF CARE
cared for will increase  8/30/2021 1324 by Chryl Fothergill, RN  Outcome: Ongoing  8/30/2021 0539 by Dhara Goff RN  Outcome: Ongoing     Problem: Psychomotor Activity - Altered:  Goal: Absence of psychomotor disturbance signs and symptoms  Description: Absence of psychomotor disturbance signs and symptoms  8/30/2021 1324 by Chryl Fothergill, RN  Outcome: Ongoing  8/30/2021 0539 by Dhara Goff RN  Outcome: Ongoing     Problem: Sensory Perception - Impaired:  Goal: Demonstrations of improved sensory functioning will increase  Description: Demonstrations of improved sensory functioning will increase  8/30/2021 1324 by Chryl Fothergill, RN  Outcome: Ongoing  8/30/2021 0539 by Dhara Goff RN  Outcome: Ongoing  Goal: Decrease in sensory misperception frequency  Description: Decrease in sensory misperception frequency  8/30/2021 1324 by Chryl Fothergill, RN  Outcome: Ongoing  8/30/2021 0539 by Dhara Goff RN  Outcome: Ongoing  Goal: Able to refrain from responding to false sensory perceptions  Description: Able to refrain from responding to false sensory perceptions  8/30/2021 1324 by Chryl Fothergill, RN  Outcome: Ongoing  8/30/2021 0539 by Dhara Goff RN  Outcome: Ongoing  Goal: Demonstrates accurate environmental perceptions  Description: Demonstrates accurate environmental perceptions  8/30/2021 1324 by Chryl Fothergill, RN  Outcome: Ongoing  8/30/2021 0539 by Dhara Goff RN  Outcome: Ongoing  Goal: Able to distinguish between reality-based and nonreality-based thinking  Description: Able to distinguish between reality-based and nonreality-based thinking  8/30/2021 1324 by Chryl Fothergill, RN  Outcome: Ongoing  8/30/2021 0539 by Dhara Goff RN  Outcome: Ongoing  Goal: Able to interrupt nonreality-based thinking  Description: Able to interrupt nonreality-based thinking  8/30/2021 1324 by Chryl Fothergill, RN  Outcome: Ongoing  8/30/2021 0539 by Dhara Goff RN  Outcome: Ongoing     Problem: Skin Integrity:  Goal: Will show no infection signs and symptoms  Description: Will show no infection signs and symptoms  8/30/2021 1324 by Gerardo Ley RN  Outcome: Ongoing  8/30/2021 0539 by Eleonora Andino RN  Outcome: Ongoing  Goal: Absence of new skin breakdown  Description: Absence of new skin breakdown  8/30/2021 1324 by Gerardo Ley RN  Outcome: Ongoing  8/30/2021 0539 by Eleonora Andino RN  Outcome: Ongoing     Problem: Falls - Risk of:  Goal: Absence of physical injury  Description: Absence of physical injury  8/30/2021 1324 by Gerardo Ley RN  Outcome: Ongoing  8/30/2021 0539 by Eleonora Andino RN  Outcome: Ongoing  Goal: Will remain free from falls  Description: Will remain free from falls  8/30/2021 1324 by Gerardo Ley RN  Outcome: Ongoing  8/30/2021 0539 by Eleonora Andino RN  Outcome: Ongoing     Problem: Non-Violent Restraints  Goal: Removal from restraints as soon as assessed to be safe  8/30/2021 1324 by Gerardo Ley RN  Outcome: Completed  8/30/2021 4 [facility] by Eleonora Andino RN  Outcome: Ongoing  Goal: No harm/injury to patient while restraints in use  8/30/2021 1324 by Gerardo Ley RN  Outcome: Completed  8/30/2021 0539 by Eleonora Andino RN  Outcome: Ongoing  Goal: Patient's dignity will be maintained  8/30/2021 1324 by Gerardo Ley RN  Outcome: Completed  8/30/2021 4 [facility] by Eleonora Andino RN  Outcome: Ongoing     Problem: Nutrition  Goal: Optimal nutrition therapy  8/30/2021 1324 by Gerardo Ley RN  Outcome: Ongoing  8/30/2021 0539 by Eleonora Andino RN  Outcome: Ongoing

## 2021-08-30 NOTE — CARE COORDINATION
Transitional Planning:    Pt on trach vent- fio2 30%, has TF via NG, NS does not want PEG, still has EVD, need LTACH choice from family- from [place]- attempted to call the pt's mom, Marin Nolan, no answer-  req call back.
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
## 2021-08-30 NOTE — PROCEDURES
Date: 8/30/2021 from 730 AM to 1300  Referring physician: Dr. Verena Pantoja    Indication  Patient aged 38 y with encephalopathy. EEG done to assess for epileptiform activity. Introduction  This extended more than 2 hrs vEEG was recorded using the International 10-20 System on a [employer] workstation at 256 samples/s. Automated spike and seizure detection algorithms were applied. Description  This EEG was significantly limited due to lead, motion and EMG artifacts. The background consistent of diffuse reactive detla and theta slowing. No consistent focal slowing or interhemispheric asymmetry was noted. Stage I and stage II sleep were not observed. There were no interictal epileptiform discharges or electrographic seizures. Activations  Hyperventilation was not performed. Intermittent photic stimulation was not performed. Impression  Abnormal awake but limited vEEG. The slowing mentioned above suggests moderate non specific encephalopathy. EKG lead was not reliable for read. No epileptiform discharges were identified. Please note the absence of such activity on this record cannot conclusively rule out an epileptic disorder. If such is still clinically suspected, a repeat study with sleep deprivation and/or prolonged sampling may be helpful. Celina Angel MD  Epilepsy Board Certified. Neurology Board Certified.     Electronically Signed

## 2021-08-30 NOTE — PLAN OF CARE
Problem: OXYGENATION/RESPIRATORY FUNCTION  Goal: Patient will maintain patent airway  8/30/2021 0910 by Deion Barrera RCP  Outcome: Ongoing  8/30/2021 0539 by Mayra Mays RN  Outcome: Ongoing  8/29/2021 2050 by Justen Campbell RCP  Outcome: Ongoing  Goal: Patient will achieve/maintain normal respiratory rate/effort  Description: Respiratory rate and effort will be within normal limits for the patient  8/30/2021 0910 by Deion Barrera RCP  Outcome: Ongoing  8/30/2021 0539 by Mayra Mays RN  Outcome: Ongoing  8/29/2021 2050 by Justen Campbell RCP  Outcome: Ongoing     Problem: MECHANICAL VENTILATION  Goal: Patient will maintain patent airway  8/30/2021 0910 by Deion Barrera RCP  Outcome: Ongoing  8/30/2021 0539 by Mayra Mays RN  Outcome: Ongoing  8/29/2021 2050 by Justen Campbell RCP  Outcome: Ongoing  Goal: Oral health is maintained or improved  8/30/2021 0910 by Deion Barrera RCP  Outcome: Ongoing  8/30/2021 0539 by Mayra Mays RN  Outcome: Ongoing  8/29/2021 2050 by Justen Campbell RCP  Outcome: Ongoing  Goal: ET tube will be managed safely  8/30/2021 0910 by Deion Barrera RCP  Outcome: Ongoing  8/30/2021 0539 by Mayra Mays RN  Outcome: Ongoing  8/29/2021 2050 by Justen Campbell RCP  Outcome: Ongoing  Goal: Ability to express needs and understand communication  8/30/2021 0910 by Deion Barrera RCP  Outcome: Ongoing  8/30/2021 0539 by Mayra Mays RN  Outcome: Ongoing  8/29/2021 2050 by Justen Campbell RCP  Outcome: Ongoing  Goal: Mobility/activity is maintained at optimum level for patient  8/30/2021 0910 by Deion Barrera RCP  Outcome: Ongoing  8/30/2021 0539 by Mayra Mays RN  Outcome: Ongoing  8/29/2021 2050 by Justen Campbell RCP  Outcome: Ongoing
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
## 2021-08-30 NOTE — PROCEDURES
This gentleman to aeroallergens and Lyrica 30 LONG-TERM EEG-VIDEO 5656 [address]    Patient: Lew Bautista  Age: 38 y.o. MRN: [id number]    Referring Physician: No ref. provider found  History: The patient is a 38 y.o. female who presented breakthrough seizure/encephalopathy. This long-term video-EEG monitoring study was performed to determine the nature of the patient's clinical events. The patient is on neuroactive medications.    Lew Bautista   Current Facility-Administered Medications   Medication Dose Route Frequency Provider Last Rate Last Admin    magnesium sulfate 2000 mg in 50 mL IVPB premix  2,000 mg IntraVENous Q2H Laqueta Prost, DO 25 mL/hr at 08/30/21 0832 2,000 mg at 08/30/21 8430    propranolol (INDERAL) tablet 10 mg  10 mg Oral TID CAIO Donahue - CNP   10 mg at 08/30/21 0831    enoxaparin (LOVENOX) injection 30 mg  30 mg Subcutaneous BID Jamie Davis, DO   30 mg at 08/30/21 0831    famotidine (PEPCID) tablet 20 mg  20 mg Per NG tube BID Jamie Davis, DO   20 mg at 08/30/21 0831    levETIRAcetam (KEPPRA) 100 MG/ML solution 1,000 mg  1,000 mg Oral BID Jamie Davis, DO   1,000 mg at 08/30/21 0831    chlorhexidine (PERIDEX) 0.12 % solution 15 mL  15 mL Mouth/Throat BID Jamie Davis, DO   15 mL at 08/30/21 0831    sodium chloride flush 0.9 % injection 5-40 mL  5-40 mL IntraVENous 2 times per day Jamie Davis, DO   10 mL at 08/29/21 2014    sodium chloride flush 0.9 % injection 5-40 mL  5-40 mL IntraVENous PRN Jamie Davis, DO        0.9 % sodium chloride infusion  25 mL IntraVENous PRN Jamie Davis, DO        ondansetron (ZOFRAN-ODT) disintegrating tablet 4 mg  4 mg Oral Q8H PRN Jamie Davis, DO        Or    ondansetron [facility]) injection 4 mg  4 mg IntraVENous Q6H PRN Jamie Davis, DO        polyethylene glycol (GLYCOLAX) packet 17 g  17 g Oral Daily Jamie Davis DO   17 g at 08/28/21 7811    bisacodyl (DULCOLAX) suppository 10 mg  10 mg Rectal Daily Ricatawny Zaldivarian, DO   10 mg at 08/28/21 0837    0.9 % sodium chloride infusion   IntraVENous Continuous [place],  mL/hr at 08/30/21 0452 1,000 mL at 08/30/21 0452    acetaminophen (TYLENOL) tablet 1,000 mg  1,000 mg Oral 3 times per day [place], DO   1,000 mg at 08/30/21 0001     Technical Description: This is a 21-channel digital EEG recording with time-locked video. Electrodes were placed in accordance with the 10-20 International System of Electrode Placement. Single lead EKG monitoring was included. Baseline EEG Recording:  A formal baseline EEG recording was not obtained. Day 1 - 8/27/21, starting at 1433    Interictal EEG Samples: The background activity consisted of 4 to 5 Hz of polymorphic delta and theta activity of 15 to 20 µV. There was poor anterior posterior amplitude gradient. Frequent periods of diffuse attenuation were seen lasting for up to 3 to 4 seconds. The background appears symmetric. Normal wakeful pattern and normal sleep architecture was not seen. The EKG channel revealed no abnormalities. Ictal EEG Recording / Patient Events: During this period the patient had no events or seizures. Summary: During this day of recording no events were recorded. The interictal EEG was abnormal due to diffuse polymorphic delta theta slowing and periods of diffuse attenuation suggesting severe encephalopathy. No abnormal epileptiform activity was seen. Monitoring was continued in order to record the patient's typical events. The EKG channel revealed no abnormalities. Day 2 - 8/28/21    Interictal EEG Samples: As the recording progressed, the background appeared more continuous. Ictal EEG Recording / Patient Events: During this period the patient had no events or seizures. Summary: During this day of recording no events were recorded.   The interictal EEG was abnormal due to diffuse polymorphic delta and theta slowing suggesting severe encephalopathy, minimally improved from yesterday. No abnormal epileptiform activity was seen. Monitoring was continued in order to record the patients typical events. The EKG channel revealed no abnormalities. Day 3 - 8/29/21    Interictal EEG Samples: Interictal EEG was unchanged from yesterday. Ictal EEG Recording / Patient Events: During this period the patient had no events or seizures. Summary: During this day of recording no events were recorded. The interictal EEG was abnormal due to diffuse polymorphic delta and theta slowing suggesting severe encephalopathy, unchanged from yesterday. Monitoring was continued in order to record the patients typical events. The EKG channel revealed no abnormalities. Day 4 - 8/30/21, reviewed through 7:30 am Remainder of the EEG will be reviewed by my colleague Dr. Momo Neville. Interictal EEG Samples: Interictal EEG was unchanged from yesterday. Ictal EEG Recording / Patient Events: During this period the patient had no events or seizures. Summary: During this day of recording no events were recorded. The interictal EEG was abnormal due to diffuse polymorphic delta and theta slowing suggesting severe encephalopathy. Monitoring was continued in order to record the patients typical events. The EKG channel revealed no abnormalities.       Kirstin Fisher MD  Diplomate, American Board of Psychiatry and Neurology  Diplomate, American Board of Clinical Neurophysiology  Diplomate, American Board of Epilepsy
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
## 2021-08-30 NOTE — FLOWSHEET NOTE
Assessment:  Patient is a 38year old female. Patient's family In TICU waiting area.  introduced himself. Patient's mother tearfully asked for payer. Intervention:   prayed with family. Outcome: Family expressed gratitude    Plan: Chaplains will remain available for spiritual and emotional support as needed. 08/29/21 6343   Encounter Summary   Services provided to: [address] Parent; Family members   Continue Visiting   (8/29/2021)   Complexity of Encounter Low   Length of Encounter 30 minutes   Spiritual Assessment Completed Yes   Routine   Type Follow up   Assessment Tearful;Grieving   Intervention Active listening;Prayer;Sustaining presence/ Ministry of presence   Outcome Expressed gratitude

## 2021-08-30 NOTE — FLOWSHEET NOTE
Patient's mother Jerry Staley called the unit for update on patient. All questions answered at this time.  Jerry Staley verbalized her understanding of the current plan of care and expressed her gratitude for the health care team.      Electronically signed by Kevin Grijalva RN on 8/30/2021 at 11:04 AM

## 2021-08-30 NOTE — ANESTHESIA POSTPROCEDURE EVALUATION
Department of Anesthesiology  Postprocedure Note    Patient: Elizabeth Cao  MRN: [id number]  [place]: 1985  Date of evaluation: 8/30/2021  Time:  3:42 AM     Procedure Summary     Date: 08/29/21 Room / Location: [address]    Anesthesia Start: 2208 Anesthesia Stop: 6317    Procedure: TRACHEOTOMY, (N/A ) Diagnosis: (RESPIRATORY FAILURE)    Surgeons: Law Cabral MD Responsible Provider: Jacquelin Torrez MD    Anesthesia Type: Not recorded ASA Status: Not recorded          Anesthesia Type: No value filed. Patito Phase I:      Patito Phase II:      Last vitals: Reviewed and per EMR flowsheets.        Anesthesia Post Evaluation    Patient location during evaluation: ICU  Patient participation: complete - patient cannot participate  Level of consciousness: awake and alert  Airway patency: patent  Nausea & Vomiting: no nausea and no vomiting  Complications: no  Cardiovascular status: blood pressure returned to baseline  Respiratory status: acceptable  Hydration status: euvolemic

## 2021-08-30 NOTE — PLAN OF CARE
Problem: OXYGENATION/RESPIRATORY FUNCTION  Goal: Patient will maintain patent airway  8/29/2021 2050 by William العراقي RCP  Outcome: Ongoing     Problem: OXYGENATION/RESPIRATORY FUNCTION  Goal: Patient will achieve/maintain normal respiratory rate/effort  Description: Respiratory rate and effort will be within normal limits for the patient  8/29/2021 2050 by William العراقي RCP  Outcome: Ongoing     Problem: MECHANICAL VENTILATION  Goal: Patient will maintain patent airway  8/29/2021 2050 by William العراقي RCP  Outcome: Ongoing     Problem: MECHANICAL VENTILATION  Goal: Oral health is maintained or improved  8/29/2021 2050 by William العراقي RCP  Outcome: Ongoing     Problem: MECHANICAL VENTILATION  Goal: ET tube will be managed safely  8/29/2021 2050 by William العراقي RCP  Outcome: Ongoing     Problem: MECHANICAL VENTILATION  Goal: Ability to express needs and understand communication  8/29/2021 2050 by William العراقي RCP  Outcome: Ongoing     Problem: MECHANICAL VENTILATION  Goal: Mobility/activity is maintained at optimum level for patient  8/29/2021 2050 by William العراقي RCP  Outcome: Ongoing     Problem: SKIN INTEGRITY  Goal: Skin integrity is maintained or improved  8/29/2021 2050 by William العراقي RCP  Outcome: Ongoing

## 2021-08-31 ENCOUNTER — APPOINTMENT (OUTPATIENT)
Dept: GENERAL RADIOLOGY | Age: 36
DRG: 003 | End: 2021-08-31
Payer: COMMERCIAL

## 2021-08-31 ENCOUNTER — APPOINTMENT (OUTPATIENT)
Dept: MRI IMAGING | Age: 36
DRG: 003 | End: 2021-08-31
Payer: COMMERCIAL

## 2021-08-31 LAB
ABSOLUTE EOS #: 0 K/UL (ref 0–0.4)
ABSOLUTE EOS #: 0.26 K/UL (ref 0–0.44)
ABSOLUTE IMMATURE GRANULOCYTE: 0.14 K/UL (ref 0–0.3)
ABSOLUTE IMMATURE GRANULOCYTE: 0.69 K/UL (ref 0–0.3)
ABSOLUTE LYMPH #: 0.86 K/UL (ref 1–4.8)
ABSOLUTE LYMPH #: 1.7 K/UL (ref 1.1–3.7)
ABSOLUTE MONO #: 0.17 K/UL (ref 0.1–0.8)
ABSOLUTE MONO #: 0.95 K/UL (ref 0.1–1.2)
ALLEN TEST: ABNORMAL
ALLEN TEST: ABNORMAL
ANION GAP SERPL CALCULATED.3IONS-SCNC: 10 MMOL/L (ref 9–17)
ANION GAP SERPL CALCULATED.3IONS-SCNC: 13 MMOL/L (ref 9–17)
BASOPHILS # BLD: 0 % (ref 0–2)
BASOPHILS # BLD: 0 % (ref 0–2)
BASOPHILS ABSOLUTE: 0 K/UL (ref 0–0.2)
BASOPHILS ABSOLUTE: 0.05 K/UL (ref 0–0.2)
BUN BLDV-MCNC: 16 MG/DL (ref 6–20)
BUN BLDV-MCNC: 46 MG/DL (ref 6–20)
BUN/CREAT BLD: ABNORMAL (ref 9–20)
BUN/CREAT BLD: ABNORMAL (ref 9–20)
CALCIUM SERPL-MCNC: 7.4 MG/DL (ref 8.6–10.4)
CALCIUM SERPL-MCNC: 8 MG/DL (ref 8.6–10.4)
CHLORIDE BLD-SCNC: 110 MMOL/L (ref 98–107)
CHLORIDE BLD-SCNC: 122 MMOL/L (ref 98–107)
CO2: 17 MMOL/L (ref 20–31)
CO2: 22 MMOL/L (ref 20–31)
CREAT SERPL-MCNC: 0.32 MG/DL (ref 0.5–0.9)
CREAT SERPL-MCNC: 1.41 MG/DL (ref 0.5–0.9)
DIFFERENTIAL TYPE: ABNORMAL
DIFFERENTIAL TYPE: ABNORMAL
EOSINOPHILS RELATIVE PERCENT: 0 % (ref 1–4)
EOSINOPHILS RELATIVE PERCENT: 2 % (ref 1–4)
FIO2: 30
FIO2: ABNORMAL
GFR AFRICAN AMERICAN: 51 ML/MIN
GFR AFRICAN AMERICAN: >60 ML/MIN
GFR NON-AFRICAN AMERICAN: 42 ML/MIN
GFR NON-AFRICAN AMERICAN: >60 ML/MIN
GFR SERPL CREATININE-BSD FRML MDRD: ABNORMAL ML/MIN/{1.73_M2}
GLUCOSE BLD-MCNC: 115 MG/DL (ref 74–100)
GLUCOSE BLD-MCNC: 118 MG/DL (ref 70–99)
GLUCOSE BLD-MCNC: 127 MG/DL (ref 70–99)
HCT VFR BLD CALC: 25.5 % (ref 36.3–47.1)
HCT VFR BLD CALC: 31.9 % (ref 36.3–47.1)
HEMOGLOBIN: 10.3 G/DL (ref 11.9–15.1)
HEMOGLOBIN: 8.3 G/DL (ref 11.9–15.1)
IMMATURE GRANULOCYTES: 1 %
IMMATURE GRANULOCYTES: 4 %
LYMPHOCYTES # BLD: 13 % (ref 24–43)
LYMPHOCYTES # BLD: 5 % (ref 24–44)
MAGNESIUM: 2 MG/DL (ref 1.6–2.6)
MAGNESIUM: 2.2 MG/DL (ref 1.6–2.6)
MCH RBC QN AUTO: 28.3 PG (ref 25.2–33.5)
MCH RBC QN AUTO: 29.3 PG (ref 25.2–33.5)
MCHC RBC AUTO-ENTMCNC: 32.3 G/DL (ref 28.4–34.8)
MCHC RBC AUTO-ENTMCNC: 32.5 G/DL (ref 28.4–34.8)
MCV RBC AUTO: 87 FL (ref 82.6–102.9)
MCV RBC AUTO: 90.9 FL (ref 82.6–102.9)
MODE: ABNORMAL
MODE: ABNORMAL
MONOCYTES # BLD: 1 % (ref 1–7)
MONOCYTES # BLD: 7 % (ref 3–12)
MORPHOLOGY: ABNORMAL
NEGATIVE BASE EXCESS, ART: ABNORMAL (ref 0–2)
NEGATIVE BASE EXCESS, ART: ABNORMAL (ref 0–2)
NRBC AUTOMATED: 0 PER 100 WBC
NRBC AUTOMATED: 4.5 PER 100 WBC
NUCLEATED RED BLOOD CELLS: 7 PER 100 WBC
O2 DEVICE/FLOW/%: ABNORMAL
O2 DEVICE/FLOW/%: ABNORMAL
PATIENT TEMP: 37.9
PATIENT TEMP: ABNORMAL
PDW BLD-RTO: 12.7 % (ref 11.8–14.4)
PDW BLD-RTO: 15.3 % (ref 11.8–14.4)
PHOSPHORUS: 2.6 MG/DL (ref 2.6–4.5)
PHOSPHORUS: 2.6 MG/DL (ref 2.6–4.5)
PLATELET # BLD: 309 K/UL (ref 138–453)
PLATELET # BLD: ABNORMAL K/UL (ref 138–453)
PLATELET ESTIMATE: ABNORMAL
PLATELET ESTIMATE: ABNORMAL
PMV BLD AUTO: 9.2 FL (ref 8.1–13.5)
PMV BLD AUTO: ABNORMAL FL (ref 8.1–13.5)
POC HCO3: 23.5 MMOL/L (ref 21–28)
POC HCO3: 25.4 MMOL/L (ref 21–28)
POC LACTIC ACID: 0.66 MMOL/L (ref 0.56–1.39)
POC LACTIC ACID: 0.66 MMOL/L (ref 0.56–1.39)
POC O2 SATURATION: 99 % (ref 94–98)
POC O2 SATURATION: 99 % (ref 94–98)
POC PCO2 TEMP: 30 MM HG
POC PCO2 TEMP: ABNORMAL MM HG
POC PCO2: 29.1 MM HG (ref 35–48)
POC PCO2: 33.5 MM HG (ref 35–48)
POC PH TEMP: 7.5
POC PH TEMP: ABNORMAL
POC PH: 7.49 (ref 7.35–7.45)
POC PH: 7.52 (ref 7.35–7.45)
POC PO2 TEMP: 137 MM HG
POC PO2 TEMP: ABNORMAL MM HG
POC PO2: 128.3 MM HG (ref 83–108)
POC PO2: 131.7 MM HG (ref 83–108)
POSITIVE BASE EXCESS, ART: 1 (ref 0–3)
POSITIVE BASE EXCESS, ART: 2 (ref 0–3)
POTASSIUM SERPL-SCNC: 3.6 MMOL/L (ref 3.7–5.3)
POTASSIUM SERPL-SCNC: 3.7 MMOL/L (ref 3.7–5.3)
RBC # BLD: 2.93 M/UL (ref 3.95–5.11)
RBC # BLD: 3.51 M/UL (ref 3.95–5.11)
RBC # BLD: ABNORMAL 10*6/UL
RBC # BLD: ABNORMAL 10*6/UL
SAMPLE SITE: ABNORMAL
SAMPLE SITE: ABNORMAL
SEG NEUTROPHILS: 76 % (ref 36–65)
SEG NEUTROPHILS: 90 % (ref 36–66)
SEGMENTED NEUTROPHILS ABSOLUTE COUNT: 15.48 K/UL (ref 1.8–7.7)
SEGMENTED NEUTROPHILS ABSOLUTE COUNT: 9.95 K/UL (ref 1.5–8.1)
SODIUM BLD-SCNC: 142 MMOL/L (ref 135–144)
SODIUM BLD-SCNC: 152 MMOL/L (ref 135–144)
TCO2 (CALC), ART: ABNORMAL MMOL/L (ref 22–29)
TCO2 (CALC), ART: ABNORMAL MMOL/L (ref 22–29)
WBC # BLD: 13.1 K/UL (ref 3.5–11.3)
WBC # BLD: 17.2 K/UL (ref 3.5–11.3)
WBC # BLD: ABNORMAL 10*3/UL
WBC # BLD: ABNORMAL 10*3/UL

## 2021-08-31 PROCEDURE — 2580000003 HC RX 258: Performed by: STUDENT IN AN ORGANIZED HEALTH CARE EDUCATION/TRAINING PROGRAM

## 2021-08-31 PROCEDURE — 82803 BLOOD GASES ANY COMBINATION: CPT

## 2021-08-31 PROCEDURE — 6370000000 HC RX 637 (ALT 250 FOR IP): Performed by: NURSE PRACTITIONER

## 2021-08-31 PROCEDURE — 6370000000 HC RX 637 (ALT 250 FOR IP): Performed by: STUDENT IN AN ORGANIZED HEALTH CARE EDUCATION/TRAINING PROGRAM

## 2021-08-31 PROCEDURE — 37799 UNLISTED PX VASCULAR SURGERY: CPT

## 2021-08-31 PROCEDURE — 71045 X-RAY EXAM CHEST 1 VIEW: CPT

## 2021-08-31 PROCEDURE — 2000000000 HC ICU R&B

## 2021-08-31 PROCEDURE — 72190 X-RAY EXAM OF PELVIS: CPT

## 2021-08-31 PROCEDURE — 94761 N-INVAS EAR/PLS OXIMETRY MLT: CPT

## 2021-08-31 PROCEDURE — 84100 ASSAY OF PHOSPHORUS: CPT

## 2021-08-31 PROCEDURE — 94003 VENT MGMT INPAT SUBQ DAY: CPT

## 2021-08-31 PROCEDURE — 70551 MRI BRAIN STEM W/O DYE: CPT

## 2021-08-31 PROCEDURE — 80048 BASIC METABOLIC PNL TOTAL CA: CPT

## 2021-08-31 PROCEDURE — 85025 COMPLETE CBC W/AUTO DIFF WBC: CPT

## 2021-08-31 PROCEDURE — 2700000000 HC OXYGEN THERAPY PER DAY

## 2021-08-31 PROCEDURE — 83605 ASSAY OF LACTIC ACID: CPT

## 2021-08-31 PROCEDURE — 83735 ASSAY OF MAGNESIUM: CPT

## 2021-08-31 PROCEDURE — 6360000002 HC RX W HCPCS: Performed by: STUDENT IN AN ORGANIZED HEALTH CARE EDUCATION/TRAINING PROGRAM

## 2021-08-31 PROCEDURE — 99291 CRITICAL CARE FIRST HOUR: CPT | Performed by: NEUROLOGICAL SURGERY

## 2021-08-31 PROCEDURE — 82947 ASSAY GLUCOSE BLOOD QUANT: CPT

## 2021-08-31 PROCEDURE — APPSS45 APP SPLIT SHARED TIME 31-45 MINUTES: Performed by: REGISTERED NURSE

## 2021-08-31 RX ORDER — ATROPINE SULFATE 0.1 MG/ML
1 INJECTION INTRAVENOUS ONCE
Status: DISCONTINUED | OUTPATIENT
Start: 2021-08-31 | End: 2021-09-03

## 2021-08-31 RX ADMIN — ENOXAPARIN SODIUM 30 MG: 30 INJECTION SUBCUTANEOUS at 08:36

## 2021-08-31 RX ADMIN — LEVETIRACETAM 1000 MG: 500 SOLUTION ORAL at 08:36

## 2021-08-31 RX ADMIN — PROPRANOLOL HYDROCHLORIDE 10 MG: 10 TABLET ORAL at 08:36

## 2021-08-31 RX ADMIN — SODIUM CHLORIDE, PRESERVATIVE FREE 10 ML: 5 INJECTION INTRAVENOUS at 08:36

## 2021-08-31 RX ADMIN — PROPRANOLOL HYDROCHLORIDE 10 MG: 10 TABLET ORAL at 14:45

## 2021-08-31 RX ADMIN — ACETAMINOPHEN 1000 MG: 500 TABLET ORAL at 14:45

## 2021-08-31 RX ADMIN — ACETAMINOPHEN 1000 MG: 500 TABLET ORAL at 05:51

## 2021-08-31 RX ADMIN — PROPRANOLOL HYDROCHLORIDE 10 MG: 10 TABLET ORAL at 21:38

## 2021-08-31 RX ADMIN — FAMOTIDINE 20 MG: 20 TABLET, FILM COATED ORAL at 21:39

## 2021-08-31 RX ADMIN — ACETAMINOPHEN 1000 MG: 500 TABLET ORAL at 21:39

## 2021-08-31 RX ADMIN — SODIUM CHLORIDE, PRESERVATIVE FREE 10 ML: 5 INJECTION INTRAVENOUS at 21:40

## 2021-08-31 RX ADMIN — LEVETIRACETAM 1000 MG: 500 SOLUTION ORAL at 21:39

## 2021-08-31 RX ADMIN — ENOXAPARIN SODIUM 30 MG: 30 INJECTION SUBCUTANEOUS at 21:39

## 2021-08-31 RX ADMIN — CHLORHEXIDINE GLUCONATE 0.12% ORAL RINSE 15 ML: 1.2 LIQUID ORAL at 21:38

## 2021-08-31 RX ADMIN — FAMOTIDINE 20 MG: 20 TABLET, FILM COATED ORAL at 08:37

## 2021-08-31 RX ADMIN — AMANTADINE HYDROCHLORIDE 100 MG: 50 SOLUTION ORAL at 14:45

## 2021-08-31 RX ADMIN — AMANTADINE HYDROCHLORIDE 100 MG: 50 SOLUTION ORAL at 08:37

## 2021-08-31 ASSESSMENT — PULMONARY FUNCTION TESTS
PIF_VALUE: 12
PIF_VALUE: 17
PIF_VALUE: 19
PIF_VALUE: 19
PIF_VALUE: 16
PIF_VALUE: 17
PIF_VALUE: 17

## 2021-08-31 ASSESSMENT — ENCOUNTER SYMPTOMS: TACHYPNEA: 1

## 2021-08-31 NOTE — PROGRESS NOTES
08/31/21 0753   Vent Information   Vent Mode CPAP   Pressure Support 8 cmH20   PEEP/CPAP 8     Patient started on SBT. Will wean PS as tolerated.

## 2021-08-31 NOTE — PLAN OF CARE
Nutrition Problem #1: Inadequate oral intake  Intervention: Food and/or Nutrient Delivery: Continue NPO (Resume TF as able)  Nutritional Goals: EN intake to meet >75% of estimated nutrition needs

## 2021-08-31 NOTE — PROGRESS NOTES
08/31/21 1337   Vent Information   Vent Mode PRVC  (Returned from MRI, placed on PRVC due to no spont breaths)   Patient Transport   Time spent transporting 46-60   Transport ventillation type Transport vent   Transport from ICU   Transport destination MRI   Transport destination ICU   Patient Observation   Observations Returned from MRI without incident
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
## 2021-08-31 NOTE — PLAN OF CARE
Patient continues with ICP monitoring and neuro checks, no acute changes noted. Tolerated weaning process this AM, trach secure. Skin assessment completed Qshift and prn. Patient with periods of bradycardia throughout the day, see vitals.
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
## 2021-08-31 NOTE — CARE COORDINATION
Transitional Planning:    Pt has trach- cpap during day, vent support at night. Pt still has EVD in place, and has NG- no peg yet. Met with Radha, trauma coord, who reported that the mom is requesting [facility]. Referral sent. 1553- called Saul Corona with [email or web address] regarding referral. She stated that the only thing they would not be able to take is the EVD- that it would need to be pulled prior to initiating precert. She stated that she will followup with the pt's mother, Tenisha Rodriguez, to discuss [facility] and that they will follow until the EVD is pulled.
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
## 2021-08-31 NOTE — OP NOTE
Operative Note      Patient: Francisco Wills  YOB: 1985  MRN: [id number]    Date of Procedure: 8/26/2021    Pre-Op Diagnosis: Acute subdural hematoma    Post-Op Diagnosis: Same       Procedure(s):  RIGHT FRONTAL CRANIOTOMY FOR SUBDURAL HEMATOMA EVACUATION insertion of intracranial pressure monitor    Surgeon(s):  Rudolph Heard MD    Assistant:   First Assistant: Corrie Lobo RN    Anesthesia: General    Estimated Blood Loss (mL): less than 906     Complications: None    Specimens:   * No specimens in log *    Implants:  Implant Name Type Inv. Item Serial No.  Lot No. LRB No. Used Action   PLATE BNE 6 H CRANIOMAXILLOFACIAL TI LO PROF DBL Y W/ BAR  PLATE BNE 6 H CRANIOMAXILLOFACIAL TI LO PROF DBL Y W/ BAR  CARLITA CRANIOMAXILLOFACIAL-WD  N/A 1 Implanted   COVER BUR H [id number] CRANIOMAXILLOFACIAL PLT LO PROF W/ TAB  COVER BUR H [id number] CRANIOMAXILLOFACIAL PLT LO PROF W/ TAB  CARLITA CRANIOMAXILLOFACIAL-WD  N/A 2 Implanted   COVER BUR H [id number] CRANIOMAXILLOFACIAL PLT LO PROF W/ TAB  COVER BUR H [id number] CRANIOMAXILLOFACIAL PLT LO PROF W/ TAB  CARLITA CRANIOMAXILLOFACIAL-WD  N/A 1 Implanted   SCREW BNE L5MM DIA1.5MM UNIV SELF DRL CRSS PIN 5/EA  SCREW BNE L5MM DIA1.5MM UNIV SELF DRL CRSS PIN 5/EA  CARLITA CRANIOMAXILLOFACIAL-WD  N/A 16 Implanted         Drains:   NG/OG/NJ/NE Tube Nasogastric 16 fr Left nostril (Active)   Surrounding Skin Dry; Intact 08/31/21 0400   Securement device Yes 08/31/21 0400   Status Clamped 08/31/21 0400   Placement Verified by X-Ray (Initial) 08/31/21 0400   NG/OG/NJ/NE External Measurement (cm) 60 cm 08/31/21 0400   Drainage Appearance Milky 08/31/21 0400   Tube Feeding Immune Enhancing 08/31/21 0400   Tube Feeding Status Stopped 08/31/21 0400   Rate/Schedule 45 mL/hr 08/31/21 0000   Tube Feeding Supplement Amount (mL) 367 08/30/21 1600   Tube Feeding Intake (mL) 200 ml 08/31/21 0400   Free Water Flush (mL) 40 mL 08/30/21 2000   Residual Volume (ml) 0 ml 08/31/21 0400       Ventriculostomy Left (Active)   Drain Status Clamped 08/31/21 0400   Drain Level (cm) 10 cm 08/31/21 0925   CSF Color Yellow 08/31/21 0925   Site Description Healing 08/31/21 0925   Dressing Status Other (Comment) 08/31/21 0400   Output (ml) 13 ml 08/31/21 0925       External Urinary Catheter (Active)   Catheter changed  Yes 08/30/21 2000   Urine Color Yellow 08/31/21 0400   Urine Appearance Cloudy 08/31/21 0400   Output (mL) 125 mL 08/31/21 0815   Suction 40 mmgHg continuous 08/31/21 0400   Placement Replaced 08/30/21 2000       Ventricular Device Ventricular drainage catheter with ICP microsensor Right (Active)   Site Description Edema/Swelling 08/31/21 0400   Dressing Status Other (Comment) 08/31/21 0400       [REMOVED] ICP monitor (Removed)   Status To Pressure Monitoring 08/28/21 1200   Dressing Status New drainage; Intact 08/28/21 1200   Dressing Type Split gauze 08/28/21 1200   Site Assessment Dry 08/28/21 1200   Drainage Blood Tinged 08/28/21 0400       [REMOVED] NG/OG/NJ/NE Tube Orogastric Center mouth (Removed)   Surrounding Skin Dry; Intact 08/30/21 0800   Securement device Yes 08/30/21 0800   Status Other (Comment) 08/30/21 0800   Placement Verified by External Catheter Length;by Gastric Contents 08/30/21 0800   NG/OG/NJ/NE External Measurement (cm) 58 cm 08/30/21 0800   Drainage Appearance Tan 08/30/21 0800   Tube Feeding Immune Enhancing 08/30/21 0800   Tube Feeding Status Continuous 08/30/21 0900   Rate/Schedule 45 mL/hr 08/30/21 0900   Tube Feeding Intake (mL) 186 ml 08/29/21 1600   Free Water Flush (mL) 40 mL 08/29/21 2000   Residual Volume (ml) 0 ml 08/29/21 2000   Output (mL) 100 ml 08/27/21 0400       [REMOVED] Ventriculostomy Right (Removed)       [REMOVED] Urethral Catheter (Removed)   Catheter Indications Need for fluid volume management of the critically ill patient in a critical care setting 08/25/21 1600   Site Assessment No urethral drainage 08/25/21 1600   Urine Color Yellow 08/25/21 1600   Urine Appearance Clear 08/25/21 1600   Output (mL) 20 mL 08/25/21 1600       [REMOVED] Urethral Catheter Temperature probe (Removed)   $ Urethral catheter insertion $ Not inserted for procedure 08/26/21 2100   Catheter Indications Need for fluid volume management of the critically ill patient in a critical care setting 08/30/21 1200   Securement Device Date Changed 08/26/21 08/30/21 1200   Site Assessment Urethral drainage 08/30/21 1200   Urine Color Yellow 08/30/21 1200   Urine Appearance Clear 08/30/21 1200   Output (mL) 110 mL 08/30/21 1345       [REMOVED] External Urinary Catheter (Removed)   Catheter changed  Yes 08/25/21 2000   Urine Color Yellow 08/26/21 2000   Urine Appearance Clear 08/26/21 2000   Suction 40 mmgHg continuous 08/26/21 2000   Placement Replaced 08/26/21 2000   Skin Assessment No Injury 08/26/21 2000       Findings: Acute subdural hematoma right normal intracranial pressure    Detailed Description of Procedure:   After an adequate level of general tracheal anesthesia been obtained the patient was prepared and draped in the usual sterile fashion. A right frontal parietal scalp flap was fashioned and reflected anteriorly bur holes were placed in the bone flap was then removed. The dura was tense it was opened in and a 1 cm acute subdural hematoma was evacuated. Brain beneath this girl actually look good there is no underlying injury to the cortex and not a lot of swelling. I had considered leaving the bone flap operative since the brain looked good I elected to close the dura with a running 4-0 Nurolon suture after I obtain hemostasis. Balloon flap was then reattached with in system. The scalp flap was then closed in layers. Right frontal twist drill ileostomy was performed attempted to access the lateral ventricle with an EVD but was unable to presumably due to the shift.   Elected at this point to place a parenchymal monitor no other twist drill was made and the been monitor was placed in the parenchyma of the intracranial pressures were in the range of 14-15. This was secured. A sterile dressing applied patient was taken to the recovery room in stable condition.     Electronically signed by Shahla Kelley MD on 8/31/2021 at 9:50 AM

## 2021-08-31 NOTE — PROGRESS NOTES
Comprehensive Nutrition Assessment    Type and Reason for Visit:  Reassess    Nutrition Recommendations/Plan:   - Continue NPO  - Resume tube feeding as able  - Will continue to monitor    Nutrition Assessment:  Patient remains on vent. Tube feeding stopped at time of visit. RN reports possible PEG tube placement today. Prior, patient was tolerating tube feeding at goal rate of 45 mL/hr. NG remains in place, but is clamped. Labs/Meds reviewed. Malnutrition Assessment:  Malnutrition Status:  Insufficient data    Context:  Acute Illness     Findings of the 6 clinical characteristics of malnutrition:  Energy Intake:  Mild decrease in energy intake   Weight Loss:  Unable to assess     Body Fat Loss:  Unable to assess     Muscle Mass Loss:  Unable to assess    Fluid Accumulation:  No significant fluid accumulation     Strength:  Not Performed    Estimated Daily Nutrient Needs:  Energy (kcal):  7488-1061 kcal/d (23-25 kcal/kg); Weight Used for Energy Requirements:  Admission     Protein (g):  95 g protein/d (1.5 g/kg); Weight Used for Protein Requirements:  Admission        Fluid (ml/day):  5510-5856 mL fluid/d or per MD; Method Used for Fluid Requirements:  ml/Kg (25-30 mL)      Nutrition Related Findings:  Labs/Meds reviewed. Last BM 8/31. Wounds:  Multiple, Surgical Incision       Current Nutrition Therapies:    Diet NPO Exceptions are: Sips of Water with Meds  ADULT TUBE FEEDING; Orogastric; Immune Enhancing; Continuous; 45; No; 30;  Other (specify); none  Current Tube Feeding (TF) Orders:  · Feeding Route: Nasoenteric  · Formula: Immune Enhancing (Pivot 1.5 Yazan)  · Schedule: Continuous  · Water Flushes: Per MD  · Current TF & Flush Orders Provides: Stopped  · Goal TF & Flush Orders Provides: @ 45 mL/hr = 1620 kcal, 101 g protein, 810 mL free water    Anthropometric Measures:  · Height: 5' 5\" (165.1 cm)  · Current Body Weight: 140 lb 3.4 oz (63.6 kg)   · Admission Body Weight: 140 lb 3.4 oz (63.6 kg)

## 2021-08-31 NOTE — PLAN OF CARE
Problem: OXYGENATION/RESPIRATORY FUNCTION  Goal: Patient will maintain patent airway  8/30/2021 2007 by Talon Marino RCP  Outcome: Ongoing     Problem: OXYGENATION/RESPIRATORY FUNCTION  Goal: Patient will achieve/maintain normal respiratory rate/effort  Description: Respiratory rate and effort will be within normal limits for the patient  8/30/2021 2007 by Talon Marino RCP  Outcome: Ongoing     Problem: MECHANICAL VENTILATION  Goal: Patient will maintain patent airway  8/30/2021 2007 by Talon Marino RCP  Outcome: Ongoing     Problem: MECHANICAL VENTILATION  Goal: Oral health is maintained or improved  8/30/2021 2007 by Talon Marino RCP  Outcome: Ongoing     Problem: MECHANICAL VENTILATION  Goal: ET tube will be managed safely  8/30/2021 2007 by Talon Marino RCP  Outcome: Ongoing     Problem: MECHANICAL VENTILATION  Goal: Ability to express needs and understand communication  8/30/2021 2007 by Talon Marino RCP  Outcome: Ongoing     Problem: MECHANICAL VENTILATION  Goal: Mobility/activity is maintained at optimum level for patient  8/30/2021 2007 by Talon Marino RCP  Outcome: Ongoing     Problem: SKIN INTEGRITY  Goal: Skin integrity is maintained or improved  8/30/2021 2007 by Talon Marino RCP  Outcome: Ongoing

## 2021-09-01 LAB
ABSOLUTE EOS #: 0.29 K/UL (ref 0–0.44)
ABSOLUTE IMMATURE GRANULOCYTE: 0.12 K/UL (ref 0–0.3)
ABSOLUTE LYMPH #: 2.15 K/UL (ref 1.1–3.7)
ABSOLUTE MONO #: 0.98 K/UL (ref 0.1–1.2)
ALLEN TEST: ABNORMAL
ANION GAP SERPL CALCULATED.3IONS-SCNC: 10 MMOL/L (ref 9–17)
BASOPHILS # BLD: 1 % (ref 0–2)
BASOPHILS ABSOLUTE: 0.06 K/UL (ref 0–0.2)
BUN BLDV-MCNC: 18 MG/DL (ref 6–20)
BUN/CREAT BLD: ABNORMAL (ref 9–20)
CALCIUM SERPL-MCNC: 8.3 MG/DL (ref 8.6–10.4)
CHLORIDE BLD-SCNC: 110 MMOL/L (ref 98–107)
CO2: 22 MMOL/L (ref 20–31)
CREAT SERPL-MCNC: 0.28 MG/DL (ref 0.5–0.9)
CULTURE: ABNORMAL
DIFFERENTIAL TYPE: ABNORMAL
DIRECT EXAM: ABNORMAL
EOSINOPHILS RELATIVE PERCENT: 2 % (ref 1–4)
FIO2: 30
GFR AFRICAN AMERICAN: >60 ML/MIN
GFR NON-AFRICAN AMERICAN: >60 ML/MIN
GFR SERPL CREATININE-BSD FRML MDRD: ABNORMAL ML/MIN/{1.73_M2}
GFR SERPL CREATININE-BSD FRML MDRD: ABNORMAL ML/MIN/{1.73_M2}
GLUCOSE BLD-MCNC: 116 MG/DL (ref 70–99)
GLUCOSE BLD-MCNC: 124 MG/DL (ref 74–100)
HCT VFR BLD CALC: 26.1 % (ref 36.3–47.1)
HEMOGLOBIN: 8.4 G/DL (ref 11.9–15.1)
IMMATURE GRANULOCYTES: 1 %
LYMPHOCYTES # BLD: 18 % (ref 24–43)
Lab: ABNORMAL
MAGNESIUM: 1.8 MG/DL (ref 1.6–2.6)
MCH RBC QN AUTO: 27.8 PG (ref 25.2–33.5)
MCHC RBC AUTO-ENTMCNC: 32.2 G/DL (ref 28.4–34.8)
MCV RBC AUTO: 86.4 FL (ref 82.6–102.9)
MODE: ABNORMAL
MONOCYTES # BLD: 8 % (ref 3–12)
NEGATIVE BASE EXCESS, ART: ABNORMAL (ref 0–2)
NRBC AUTOMATED: 0 PER 100 WBC
O2 DEVICE/FLOW/%: ABNORMAL
PATIENT TEMP: ABNORMAL
PDW BLD-RTO: 12.5 % (ref 11.8–14.4)
PHOSPHORUS: 3 MG/DL (ref 2.6–4.5)
PLATELET # BLD: 365 K/UL (ref 138–453)
PLATELET ESTIMATE: ABNORMAL
PMV BLD AUTO: 9 FL (ref 8.1–13.5)
POC HCO3: 24.9 MMOL/L (ref 21–28)
POC LACTIC ACID: 0.78 MMOL/L (ref 0.56–1.39)
POC O2 SATURATION: 99 % (ref 94–98)
POC PCO2 TEMP: ABNORMAL MM HG
POC PCO2: 33.9 MM HG (ref 35–48)
POC PH TEMP: ABNORMAL
POC PH: 7.47 (ref 7.35–7.45)
POC PO2 TEMP: ABNORMAL MM HG
POC PO2: 117.8 MM HG (ref 83–108)
POSITIVE BASE EXCESS, ART: 1 (ref 0–3)
POTASSIUM SERPL-SCNC: 3.5 MMOL/L (ref 3.7–5.3)
RBC # BLD: 3.02 M/UL (ref 3.95–5.11)
RBC # BLD: ABNORMAL 10*6/UL
SAMPLE SITE: ABNORMAL
SEG NEUTROPHILS: 70 % (ref 36–65)
SEGMENTED NEUTROPHILS ABSOLUTE COUNT: 8.67 K/UL (ref 1.5–8.1)
SODIUM BLD-SCNC: 142 MMOL/L (ref 135–144)
SPECIMEN DESCRIPTION: ABNORMAL
TCO2 (CALC), ART: ABNORMAL MMOL/L (ref 22–29)
WBC # BLD: 12.3 K/UL (ref 3.5–11.3)
WBC # BLD: ABNORMAL 10*3/UL

## 2021-09-01 PROCEDURE — 6370000000 HC RX 637 (ALT 250 FOR IP): Performed by: STUDENT IN AN ORGANIZED HEALTH CARE EDUCATION/TRAINING PROGRAM

## 2021-09-01 PROCEDURE — 82803 BLOOD GASES ANY COMBINATION: CPT

## 2021-09-01 PROCEDURE — 83735 ASSAY OF MAGNESIUM: CPT

## 2021-09-01 PROCEDURE — 94761 N-INVAS EAR/PLS OXIMETRY MLT: CPT

## 2021-09-01 PROCEDURE — 83605 ASSAY OF LACTIC ACID: CPT

## 2021-09-01 PROCEDURE — 6370000000 HC RX 637 (ALT 250 FOR IP): Performed by: NURSE PRACTITIONER

## 2021-09-01 PROCEDURE — 2700000000 HC OXYGEN THERAPY PER DAY

## 2021-09-01 PROCEDURE — 84100 ASSAY OF PHOSPHORUS: CPT

## 2021-09-01 PROCEDURE — 6360000002 HC RX W HCPCS: Performed by: STUDENT IN AN ORGANIZED HEALTH CARE EDUCATION/TRAINING PROGRAM

## 2021-09-01 PROCEDURE — 94003 VENT MGMT INPAT SUBQ DAY: CPT

## 2021-09-01 PROCEDURE — 97110 THERAPEUTIC EXERCISES: CPT

## 2021-09-01 PROCEDURE — 2000000000 HC ICU R&B

## 2021-09-01 PROCEDURE — 97162 PT EVAL MOD COMPLEX 30 MIN: CPT

## 2021-09-01 PROCEDURE — 99291 CRITICAL CARE FIRST HOUR: CPT | Performed by: NEUROLOGICAL SURGERY

## 2021-09-01 PROCEDURE — APPSS30 APP SPLIT SHARED TIME 16-30 MINUTES: Performed by: PHYSICIAN ASSISTANT

## 2021-09-01 PROCEDURE — 82947 ASSAY GLUCOSE BLOOD QUANT: CPT

## 2021-09-01 PROCEDURE — 2580000003 HC RX 258: Performed by: STUDENT IN AN ORGANIZED HEALTH CARE EDUCATION/TRAINING PROGRAM

## 2021-09-01 PROCEDURE — 85025 COMPLETE CBC W/AUTO DIFF WBC: CPT

## 2021-09-01 PROCEDURE — 80048 BASIC METABOLIC PNL TOTAL CA: CPT

## 2021-09-01 PROCEDURE — 37799 UNLISTED PX VASCULAR SURGERY: CPT

## 2021-09-01 RX ORDER — POLYETHYLENE GLYCOL 3350 17 G/17G
17 POWDER, FOR SOLUTION ORAL DAILY PRN
Status: DISCONTINUED | OUTPATIENT
Start: 2021-09-01 | End: 2021-09-08

## 2021-09-01 RX ORDER — MAGNESIUM SULFATE IN WATER 40 MG/ML
2000 INJECTION, SOLUTION INTRAVENOUS ONCE
Status: COMPLETED | OUTPATIENT
Start: 2021-09-01 | End: 2021-09-01

## 2021-09-01 RX ADMIN — PROPRANOLOL HYDROCHLORIDE 10 MG: 10 TABLET ORAL at 20:28

## 2021-09-01 RX ADMIN — FAMOTIDINE 20 MG: 20 TABLET, FILM COATED ORAL at 20:28

## 2021-09-01 RX ADMIN — AMANTADINE HYDROCHLORIDE 100 MG: 50 SOLUTION ORAL at 08:19

## 2021-09-01 RX ADMIN — ACETAMINOPHEN 1000 MG: 500 TABLET ORAL at 06:40

## 2021-09-01 RX ADMIN — ENOXAPARIN SODIUM 30 MG: 30 INJECTION SUBCUTANEOUS at 08:18

## 2021-09-01 RX ADMIN — AMANTADINE HYDROCHLORIDE 100 MG: 50 SOLUTION ORAL at 13:11

## 2021-09-01 RX ADMIN — MAGNESIUM SULFATE 2000 MG: 2 INJECTION INTRAVENOUS at 08:52

## 2021-09-01 RX ADMIN — FAMOTIDINE 20 MG: 20 TABLET, FILM COATED ORAL at 08:19

## 2021-09-01 RX ADMIN — ENOXAPARIN SODIUM 30 MG: 30 INJECTION SUBCUTANEOUS at 20:28

## 2021-09-01 RX ADMIN — PROPRANOLOL HYDROCHLORIDE 10 MG: 10 TABLET ORAL at 13:11

## 2021-09-01 RX ADMIN — POTASSIUM BICARBONATE 40 MEQ: 391 TABLET, EFFERVESCENT ORAL at 09:32

## 2021-09-01 RX ADMIN — PROPRANOLOL HYDROCHLORIDE 10 MG: 10 TABLET ORAL at 08:19

## 2021-09-01 RX ADMIN — ACETAMINOPHEN 1000 MG: 500 TABLET ORAL at 22:40

## 2021-09-01 RX ADMIN — ACETAMINOPHEN 1000 MG: 500 TABLET ORAL at 13:11

## 2021-09-01 RX ADMIN — SODIUM CHLORIDE, PRESERVATIVE FREE 10 ML: 5 INJECTION INTRAVENOUS at 08:19

## 2021-09-01 RX ADMIN — CHLORHEXIDINE GLUCONATE 0.12% ORAL RINSE 15 ML: 1.2 LIQUID ORAL at 08:19

## 2021-09-01 RX ADMIN — SODIUM CHLORIDE, PRESERVATIVE FREE 10 ML: 5 INJECTION INTRAVENOUS at 20:28

## 2021-09-01 ASSESSMENT — PULMONARY FUNCTION TESTS
PIF_VALUE: 21
PIF_VALUE: 17
PIF_VALUE: 17
PIF_VALUE: 16
PIF_VALUE: 16
PIF_VALUE: 17
PIF_VALUE: 25
PIF_VALUE: 16
PIF_VALUE: 17
PIF_VALUE: 16
PIF_VALUE: 15
PIF_VALUE: 16
PIF_VALUE: 22
PIF_VALUE: 16
PIF_VALUE: 17
PIF_VALUE: 14
PIF_VALUE: 13
PIF_VALUE: 15
PIF_VALUE: 13
PIF_VALUE: 15
PIF_VALUE: 15
PIF_VALUE: 17
PIF_VALUE: 14
PIF_VALUE: 13
PIF_VALUE: 17
PIF_VALUE: 15
PIF_VALUE: 17
PIF_VALUE: 16
PIF_VALUE: 16
PIF_VALUE: 15
PIF_VALUE: 18
PIF_VALUE: 16
PIF_VALUE: 15
PIF_VALUE: 12
PIF_VALUE: 16
PIF_VALUE: 16
PIF_VALUE: 17
PIF_VALUE: 15
PIF_VALUE: 16
PIF_VALUE: 21
PIF_VALUE: 17
PIF_VALUE: 15
PIF_VALUE: 15
PIF_VALUE: 17
PIF_VALUE: 15
PIF_VALUE: 17
PIF_VALUE: 16

## 2021-09-01 ASSESSMENT — PAIN SCALES - WONG BAKER

## 2021-09-01 ASSESSMENT — PAIN SCALES - GENERAL: PAINLEVEL_OUTOF10: 0
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
## 2021-09-01 NOTE — PLAN OF CARE
Problem: OXYGENATION/RESPIRATORY FUNCTION  Goal: Patient will maintain patent airway  9/1/2021 1128 by Mary Butt RN  Outcome: Ongoing  9/1/2021 0851 by Cleve Lundberg RCP  Outcome: Ongoing  Goal: Patient will achieve/maintain normal respiratory rate/effort  Description: Respiratory rate and effort will be within normal limits for the patient  9/1/2021 1128 by Mary Butt RN  Outcome: Ongoing  9/1/2021 0851 by Cleve Lundberg RCP  Outcome: Ongoing     Problem: MECHANICAL VENTILATION  Goal: Patient will maintain patent airway  9/1/2021 1128 by Mary Butt RN  Outcome: Ongoing  9/1/2021 0851 by Cleve Lundberg RCP  Outcome: Ongoing  Goal: Oral health is maintained or improved  9/1/2021 1128 by Mary Butt RN  Outcome: Ongoing  9/1/2021 0851 by Cleve Lundberg RCP  Outcome: Ongoing  Goal: ET tube will be managed safely  9/1/2021 1128 by Mary Butt RN  Outcome: Ongoing  9/1/2021 0851 by Cleve Lundberg RCP  Outcome: Ongoing  Goal: Ability to express needs and understand communication  9/1/2021 1128 by Mary Butt RN  Outcome: Ongoing  9/1/2021 0851 by Cleve Lundberg RCP  Outcome: Ongoing  Goal: Mobility/activity is maintained at optimum level for patient  9/1/2021 1128 by Mary Butt RN  Outcome: Ongoing  9/1/2021 0851 by Cleve Lundberg RCP  Outcome: Ongoing     Problem: SKIN INTEGRITY  Goal: Skin integrity is maintained or improved  9/1/2021 1128 by Mary Butt RN  Outcome: Ongoing  9/1/2021 0851 by Cleve Lundberg RCP  Outcome: Ongoing     Problem: Confusion - Acute:  Goal: Absence of continued neurological deterioration signs and symptoms  Description: Absence of continued neurological deterioration signs and symptoms  Outcome: Ongoing  Goal: Mental status will be restored to baseline  Description: Mental status will be restored to baseline  Outcome: Ongoing     Problem: Discharge Planning:  Goal: Ability to perform activities of daily living will improve  Description: Ability to perform activities of daily living will improve  Outcome: Ongoing  Goal: Participates in care planning  Description: Participates in care planning  Outcome: Ongoing     Problem: Injury - Risk of, Physical Injury:  Goal: Absence of physical injury  Description: Absence of physical injury  Outcome: Ongoing  Goal: Will remain free from falls  Description: Will remain free from falls  Outcome: Ongoing     Problem: Mood - Altered:  Goal: Mood stable  Description: Mood stable  Outcome: Ongoing  Goal: Absence of abusive behavior  Description: Absence of abusive behavior  Outcome: Ongoing  Goal: Verbalizations of feeling emotionally comfortable while being cared for will increase  Description: Verbalizations of feeling emotionally comfortable while being cared for will increase  Outcome: Ongoing     Problem: Psychomotor Activity - Altered:  Goal: Absence of psychomotor disturbance signs and symptoms  Description: Absence of psychomotor disturbance signs and symptoms  Outcome: Ongoing     Problem: Sensory Perception - Impaired:  Goal: Demonstrations of improved sensory functioning will increase  Description: Demonstrations of improved sensory functioning will increase  Outcome: Ongoing  Goal: Decrease in sensory misperception frequency  Description: Decrease in sensory misperception frequency  Outcome: Ongoing  Goal: Able to refrain from responding to false sensory perceptions  Description: Able to refrain from responding to false sensory perceptions  Outcome: Ongoing  Goal: Demonstrates accurate environmental perceptions  Description: Demonstrates accurate environmental perceptions  Outcome: Ongoing  Goal: Able to distinguish between reality-based and nonreality-based thinking  Description: Able to distinguish between reality-based and nonreality-based thinking  Outcome: Ongoing  Goal: Able to interrupt nonreality-based thinking  Description: Able to interrupt nonreality-based thinking  Outcome: Ongoing     Problem: Sleep Pattern Disturbance:  Goal: Appears well-rested  Description: Appears well-rested  Outcome: Ongoing     Problem: Skin Integrity:  Goal: Will show no infection signs and symptoms  Description: Will show no infection signs and symptoms  Outcome: Ongoing  Goal: Absence of new skin breakdown  Description: Absence of new skin breakdown  Outcome: Ongoing     Problem: Falls - Risk of:  Goal: Absence of physical injury  Description: Absence of physical injury  Outcome: Ongoing  Goal: Will remain free from falls  Description: Will remain free from falls  Outcome: Ongoing     Problem: Nutrition  Goal: Optimal nutrition therapy  Outcome: Ongoing

## 2021-09-01 NOTE — PROGRESS NOTES
Occupational [address]  Occupational Therapy Not Seen Note    DATE: 2021    NAME: Maria Eugenia López  MRN: [id number]   : 1985      Patient not seen this date for Occupational Therapy due to:    Patient is not appropriate for active participation in OT evaluation/treatment at this time d/t intubation and not following commands.     Next Scheduled Treatment: Check back 9/3     Electronically signed by Naveen Geiger OT on 2021 at 11:25 AM

## 2021-09-01 NOTE — PLAN OF CARE
Problem: OXYGENATION/RESPIRATORY FUNCTION  Goal: Patient will maintain patent airway  9/1/2021 0851 by Ezequeil Winters RCP  Outcome: Ongoing     Problem: OXYGENATION/RESPIRATORY FUNCTION  Goal: Patient will achieve/maintain normal respiratory rate/effort  Description: Respiratory rate and effort will be within normal limits for the patient  9/1/2021 0851 by Ezequiel Winters RCP  Outcome: Ongoing     Problem: MECHANICAL VENTILATION  Goal: Patient will maintain patent airway  9/1/2021 0851 by Ezequiel Winters RCP  Outcome: Ongoing     Problem: MECHANICAL VENTILATION  Goal: Oral health is maintained or improved  9/1/2021 0851 by Ezequiel Winters RCP  Outcome: Ongoing     Problem: MECHANICAL VENTILATION  Goal: ET tube will be managed safely  9/1/2021 0851 by Ezequiel Winters RCP  Outcome: Ongoing     Problem: MECHANICAL VENTILATION  Goal: Ability to express needs and understand communication  9/1/2021 0851 by Ezequiel Winters RCP  Outcome: Ongoing     Problem: MECHANICAL VENTILATION  Goal: Mobility/activity is maintained at optimum level for patient  9/1/2021 0851 by Ezequiel Winters RCP  Outcome: Ongoing     Problem: SKIN INTEGRITY  Goal: Skin integrity is maintained or improved  9/1/2021 0851 by Ezequiel Winters RCP  Outcome: Ongoing
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
## 2021-09-01 NOTE — CARE COORDINATION
Transitional Planning  Received call from [facility] with [facility]  Updated her pt still has EVD. [facility] can accept once EVD pulled.   She also stated they could take pt prior to peg tube as well

## 2021-09-02 LAB
ABSOLUTE EOS #: 0.34 K/UL (ref 0–0.44)
ABSOLUTE IMMATURE GRANULOCYTE: 0.1 K/UL (ref 0–0.3)
ABSOLUTE LYMPH #: 2.02 K/UL (ref 1.1–3.7)
ABSOLUTE MONO #: 0.88 K/UL (ref 0.1–1.2)
ALLEN TEST: ABNORMAL
ANION GAP SERPL CALCULATED.3IONS-SCNC: 11 MMOL/L (ref 9–17)
BASOPHILS # BLD: 0 % (ref 0–2)
BASOPHILS ABSOLUTE: 0.04 K/UL (ref 0–0.2)
BUN BLDV-MCNC: 14 MG/DL (ref 6–20)
BUN/CREAT BLD: ABNORMAL (ref 9–20)
CALCIUM SERPL-MCNC: 8.2 MG/DL (ref 8.6–10.4)
CHLORIDE BLD-SCNC: 108 MMOL/L (ref 98–107)
CO2: 22 MMOL/L (ref 20–31)
CREAT SERPL-MCNC: 0.26 MG/DL (ref 0.5–0.9)
DIFFERENTIAL TYPE: ABNORMAL
EOSINOPHILS RELATIVE PERCENT: 3 % (ref 1–4)
FIO2: 30
GFR AFRICAN AMERICAN: >60 ML/MIN
GFR NON-AFRICAN AMERICAN: >60 ML/MIN
GFR SERPL CREATININE-BSD FRML MDRD: ABNORMAL ML/MIN/{1.73_M2}
GFR SERPL CREATININE-BSD FRML MDRD: ABNORMAL ML/MIN/{1.73_M2}
GLUCOSE BLD-MCNC: 117 MG/DL (ref 70–99)
GLUCOSE BLD-MCNC: 128 MG/DL (ref 74–100)
HCT VFR BLD CALC: 25.6 % (ref 36.3–47.1)
HEMOGLOBIN: 8.3 G/DL (ref 11.9–15.1)
IMMATURE GRANULOCYTES: 1 %
LYMPHOCYTES # BLD: 16 % (ref 24–43)
MAGNESIUM: 1.9 MG/DL (ref 1.6–2.6)
MCH RBC QN AUTO: 27.8 PG (ref 25.2–33.5)
MCHC RBC AUTO-ENTMCNC: 32.4 G/DL (ref 28.4–34.8)
MCV RBC AUTO: 85.6 FL (ref 82.6–102.9)
MODE: ABNORMAL
MONOCYTES # BLD: 7 % (ref 3–12)
NEGATIVE BASE EXCESS, ART: ABNORMAL (ref 0–2)
NRBC AUTOMATED: 0 PER 100 WBC
O2 DEVICE/FLOW/%: ABNORMAL
PATIENT TEMP: ABNORMAL
PDW BLD-RTO: 12.5 % (ref 11.8–14.4)
PHOSPHORUS: 3.4 MG/DL (ref 2.6–4.5)
PLATELET # BLD: 401 K/UL (ref 138–453)
PLATELET ESTIMATE: ABNORMAL
PMV BLD AUTO: 8.8 FL (ref 8.1–13.5)
POC HCO3: 24.9 MMOL/L (ref 21–28)
POC LACTIC ACID: 0.84 MMOL/L (ref 0.56–1.39)
POC O2 SATURATION: 99 % (ref 94–98)
POC PCO2 TEMP: ABNORMAL MM HG
POC PCO2: 34.5 MM HG (ref 35–48)
POC PH TEMP: ABNORMAL
POC PH: 7.47 (ref 7.35–7.45)
POC PO2 TEMP: ABNORMAL MM HG
POC PO2: 128.7 MM HG (ref 83–108)
POSITIVE BASE EXCESS, ART: 1 (ref 0–3)
POTASSIUM SERPL-SCNC: 4 MMOL/L (ref 3.7–5.3)
RBC # BLD: 2.99 M/UL (ref 3.95–5.11)
RBC # BLD: ABNORMAL 10*6/UL
SAMPLE SITE: ABNORMAL
SEG NEUTROPHILS: 73 % (ref 36–65)
SEGMENTED NEUTROPHILS ABSOLUTE COUNT: 9.1 K/UL (ref 1.5–8.1)
SODIUM BLD-SCNC: 141 MMOL/L (ref 135–144)
TCO2 (CALC), ART: ABNORMAL MMOL/L (ref 22–29)
WBC # BLD: 12.5 K/UL (ref 3.5–11.3)
WBC # BLD: ABNORMAL 10*3/UL

## 2021-09-02 PROCEDURE — 82803 BLOOD GASES ANY COMBINATION: CPT

## 2021-09-02 PROCEDURE — 80048 BASIC METABOLIC PNL TOTAL CA: CPT

## 2021-09-02 PROCEDURE — 2000000000 HC ICU R&B

## 2021-09-02 PROCEDURE — 83735 ASSAY OF MAGNESIUM: CPT

## 2021-09-02 PROCEDURE — 94761 N-INVAS EAR/PLS OXIMETRY MLT: CPT

## 2021-09-02 PROCEDURE — 6370000000 HC RX 637 (ALT 250 FOR IP): Performed by: NURSE PRACTITIONER

## 2021-09-02 PROCEDURE — 6370000000 HC RX 637 (ALT 250 FOR IP): Performed by: STUDENT IN AN ORGANIZED HEALTH CARE EDUCATION/TRAINING PROGRAM

## 2021-09-02 PROCEDURE — 85025 COMPLETE CBC W/AUTO DIFF WBC: CPT

## 2021-09-02 PROCEDURE — 82947 ASSAY GLUCOSE BLOOD QUANT: CPT

## 2021-09-02 PROCEDURE — 2580000003 HC RX 258: Performed by: STUDENT IN AN ORGANIZED HEALTH CARE EDUCATION/TRAINING PROGRAM

## 2021-09-02 PROCEDURE — 83605 ASSAY OF LACTIC ACID: CPT

## 2021-09-02 PROCEDURE — APPSS30 APP SPLIT SHARED TIME 16-30 MINUTES: Performed by: PHYSICIAN ASSISTANT

## 2021-09-02 PROCEDURE — 6360000002 HC RX W HCPCS: Performed by: STUDENT IN AN ORGANIZED HEALTH CARE EDUCATION/TRAINING PROGRAM

## 2021-09-02 PROCEDURE — 94003 VENT MGMT INPAT SUBQ DAY: CPT

## 2021-09-02 PROCEDURE — 37799 UNLISTED PX VASCULAR SURGERY: CPT

## 2021-09-02 PROCEDURE — 99291 CRITICAL CARE FIRST HOUR: CPT | Performed by: NEUROLOGICAL SURGERY

## 2021-09-02 PROCEDURE — 2700000000 HC OXYGEN THERAPY PER DAY

## 2021-09-02 PROCEDURE — 84100 ASSAY OF PHOSPHORUS: CPT

## 2021-09-02 RX ADMIN — ACETAMINOPHEN 1000 MG: 500 TABLET ORAL at 13:49

## 2021-09-02 RX ADMIN — FAMOTIDINE 20 MG: 20 TABLET, FILM COATED ORAL at 07:49

## 2021-09-02 RX ADMIN — SODIUM CHLORIDE, PRESERVATIVE FREE 10 ML: 5 INJECTION INTRAVENOUS at 20:58

## 2021-09-02 RX ADMIN — AMANTADINE HYDROCHLORIDE 100 MG: 50 SOLUTION ORAL at 07:50

## 2021-09-02 RX ADMIN — MAGNESIUM GLUCONATE 500 MG ORAL TABLET 400 MG: 500 TABLET ORAL at 09:10

## 2021-09-02 RX ADMIN — ENOXAPARIN SODIUM 30 MG: 30 INJECTION SUBCUTANEOUS at 07:50

## 2021-09-02 RX ADMIN — ENOXAPARIN SODIUM 30 MG: 30 INJECTION SUBCUTANEOUS at 20:57

## 2021-09-02 RX ADMIN — AMANTADINE HYDROCHLORIDE 100 MG: 50 SOLUTION ORAL at 13:49

## 2021-09-02 RX ADMIN — SODIUM CHLORIDE, PRESERVATIVE FREE 10 ML: 5 INJECTION INTRAVENOUS at 07:50

## 2021-09-02 RX ADMIN — PROPRANOLOL HYDROCHLORIDE 10 MG: 10 TABLET ORAL at 20:57

## 2021-09-02 RX ADMIN — FAMOTIDINE 20 MG: 20 TABLET, FILM COATED ORAL at 20:57

## 2021-09-02 RX ADMIN — PROPRANOLOL HYDROCHLORIDE 10 MG: 10 TABLET ORAL at 07:50

## 2021-09-02 RX ADMIN — MAGNESIUM GLUCONATE 500 MG ORAL TABLET 400 MG: 500 TABLET ORAL at 13:49

## 2021-09-02 RX ADMIN — ACETAMINOPHEN 1000 MG: 500 TABLET ORAL at 05:33

## 2021-09-02 RX ADMIN — CHLORHEXIDINE GLUCONATE 0.12% ORAL RINSE 15 ML: 1.2 LIQUID ORAL at 07:49

## 2021-09-02 RX ADMIN — MAGNESIUM GLUCONATE 500 MG ORAL TABLET 400 MG: 500 TABLET ORAL at 20:58

## 2021-09-02 RX ADMIN — ACETAMINOPHEN 1000 MG: 500 TABLET ORAL at 21:00

## 2021-09-02 RX ADMIN — PROPRANOLOL HYDROCHLORIDE 10 MG: 10 TABLET ORAL at 13:49

## 2021-09-02 ASSESSMENT — PAIN SCALES - WONG BAKER
WONGBAKER_NUMERICALRESPONSE: 0

## 2021-09-02 ASSESSMENT — PULMONARY FUNCTION TESTS
PIF_VALUE: 10
PIF_VALUE: 17
PIF_VALUE: 18
PIF_VALUE: 11
PIF_VALUE: 18
PIF_VALUE: 11
PIF_VALUE: 15
PIF_VALUE: 18

## 2021-09-02 NOTE — PROGRESS NOTES
Occupational [address]  Occupational Therapy Not Seen Note    DATE: 2021  Name: Solitario Hess  : 1985  MRN: [id number]    Patient not available for Occupational Therapy due to:      [x] Other: Vented via trach, not actively following commands. Next Scheduled Treatment: Attempt on  as appropriate.     Mliss Rell, OT OTR/L
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
## 2021-09-02 NOTE — PLAN OF CARE
Problem: Injury - Risk of, Physical Injury:  Goal: Absence of physical injury  Description: Absence of physical injury  Outcome: Met This Shift  Goal: Will remain free from falls  Description: Will remain free from falls  Outcome: Met This Shift     Problem: Falls - Risk of:  Goal: Absence of physical injury  Description: Absence of physical injury  Outcome: Met This Shift  Goal: Will remain free from falls  Description: Will remain free from falls  Outcome: Met This Shift     Problem: OXYGENATION/RESPIRATORY FUNCTION  Goal: Patient will maintain patent airway  9/2/2021 0603 by Yessy Mcghee RN  Outcome: Ongoing  9/2/2021 0217 by Adeel Tyler RCP  Outcome: Ongoing  Goal: Patient will achieve/maintain normal respiratory rate/effort  Description: Respiratory rate and effort will be within normal limits for the patient  9/2/2021 0603 by Yessy Mcghee RN  Outcome: Ongoing  9/2/2021 0217 by Adeel Tyler RCP  Outcome: Ongoing     Problem: MECHANICAL VENTILATION  Goal: Patient will maintain patent airway  9/2/2021 0603 by Yessy Mcghee RN  Outcome: Ongoing  9/2/2021 0217 by Adeel Tyler RCP  Outcome: Ongoing  Goal: Oral health is maintained or improved  9/2/2021 0603 by Yessy Mcghee RN  Outcome: Ongoing  9/2/2021 0217 by Adeel Tyler RCP  Outcome: Ongoing  Goal: Ability to express needs and understand communication  9/2/2021 0603 by Yessy Mcghee RN  Outcome: Ongoing  9/2/2021 0217 by Adeel Tyler RCP  Outcome: Ongoing  Goal: Mobility/activity is maintained at optimum level for patient  9/2/2021 0603 by Yesys Mcghee RN  Outcome: Ongoing  9/2/2021 0217 by Adeel Tyler RCP  Outcome: Ongoing  Goal: Tracheostomy will be managed safely  9/2/2021 0603 by Yessy Mcghee RN  Outcome: Ongoing  9/2/2021 0217 by Adeel Tyler RCP  Outcome: Ongoing     Problem: SKIN INTEGRITY  Goal: Skin integrity is maintained or improved  9/2/2021 0603 by Yessy Mcghee RN  Outcome: Ongoing  9/2/2021 0217 by Jay Cote RCP  Outcome: Ongoing     Problem: Confusion - Acute:  Goal: Absence of continued neurological deterioration signs and symptoms  Description: Absence of continued neurological deterioration signs and symptoms  Outcome: Ongoing  Goal: Mental status will be restored to baseline  Description: Mental status will be restored to baseline  Outcome: Ongoing     Problem: Discharge Planning:  Goal: Ability to perform activities of daily living will improve  Description: Ability to perform activities of daily living will improve  Outcome: Ongoing  Goal: Participates in care planning  Description: Participates in care planning  Outcome: Ongoing     Problem: Mood - Altered:  Goal: Mood stable  Description: Mood stable  Outcome: Ongoing  Goal: Absence of abusive behavior  Description: Absence of abusive behavior  Outcome: Ongoing  Goal: Verbalizations of feeling emotionally comfortable while being cared for will increase  Description: Verbalizations of feeling emotionally comfortable while being cared for will increase  Outcome: Ongoing     Problem: Psychomotor Activity - Altered:  Goal: Absence of psychomotor disturbance signs and symptoms  Description: Absence of psychomotor disturbance signs and symptoms  Outcome: Ongoing     Problem: Sensory Perception - Impaired:  Goal: Demonstrations of improved sensory functioning will increase  Description: Demonstrations of improved sensory functioning will increase  Outcome: Ongoing  Goal: Decrease in sensory misperception frequency  Description: Decrease in sensory misperception frequency  Outcome: Ongoing  Goal: Able to refrain from responding to false sensory perceptions  Description: Able to refrain from responding to false sensory perceptions  Outcome: Ongoing  Goal: Demonstrates accurate environmental perceptions  Description: Demonstrates accurate environmental perceptions  Outcome: Ongoing  Goal: Able to distinguish between reality-based and

## 2021-09-02 NOTE — PROGRESS NOTES
Comprehensive Nutrition Assessment    Type and Reason for Visit:  Reassess    Nutrition Recommendations/Plan: Continue current tube feeding at this time. Will continue to monitor TF tolerance/adequacy. Nutrition Assessment:  Chart reviewed. Immune Enhancing Tube Feeding at goal rate of 45 mL/hr. Last BM noted: 8/31/21. Meds/labs reviewed. Malnutrition Assessment:  Malnutrition Status:  Insufficient data      Estimated Daily Nutrient Needs:  Energy (kcal):  3087-8941 kcal/d (23-25 kcal/kg); Weight Used for Energy Requirements:  Admission     Protein (g):  95 g protein/d (1.5 g/kg); Weight Used for Protein Requirements:  Admission        Fluid (ml/day):  8337-1054 mL fluid/d or per MD; Method Used for Fluid Requirements:  ml/Kg (25-30 mL)      Nutrition Related Findings:  Labs/Meds reviewed. Last BM 8/31. Wounds:  Multiple, Surgical Incision       Current Nutrition Therapies:    Diet NPO Exceptions are: Sips of Water with Meds  ADULT TUBE FEEDING; Orogastric; Immune Enhancing; Continuous; 45; No; 30; Other (specify); none  Current Tube Feeding (TF) Orders:  · Feeding Route: Nasoenteric  · Formula: Immune Enhancing  · Schedule: Continuous at 45 mL/hr  · Water Flushes: Per MD  · Current TF & Flush Orders Provides: 45 mL/xp=4008 kcal, 101 g pro/, 810 mL free water  · Goal TF & Flush Orders Provides: @ 45 mL/hr = 1620 kcal, 101 g protein, 810 mL free water    Additional Calorie Sources:   None    Anthropometric Measures:  · Height: 5' 5\" (165.1 cm)  · Current Body Weight: 140 lb 3.4 oz (63.6 kg)   · Admission Body Weight: 140 lb 3.4 oz (63.6 kg)    · Usual Body Weight: 137 lb 13 oz (62.5 kg) (7/22/2021)     · Ideal Body Weight: 125 lbs; % Ideal Body Weight 112.2 %   · BMI: 23.3  · BMI Categories: Normal Weight (BMI 18.5-24. 9)       Nutrition Diagnosis:   · Inadequate oral intake related to impaired respiratory function, acute injury/trauma as evidenced by NPO or clear liquid status due to medical condition, intubation, nutrition support - enteral nutrition    Nutrition Interventions:   Food and/or Nutrient Delivery:  Continue Current Tube Feeding  Nutrition Education/Counseling:  No recommendation at this time   Coordination of Nutrition Care:  Continue to monitor while inpatient    Goals:  EN intake to meet >75% of estimated nutrition needs-goal achieved     Nutrition Monitoring and Evaluation:   Behavioral-Environmental Outcomes:  None Identified   Food/Nutrient Intake Outcomes:  Enteral Nutrition Intake/Tolerance  Physical Signs/Symptoms Outcomes:  Biochemical Data, Nutrition Focused Physical Findings, Skin, Weight     Discharge Planning:     Too soon to determine     Electronically signed by Cat Mirza RD, LD on 9/2/21 at 2:11 PM EDT    Contact: [phone]

## 2021-09-02 NOTE — PROGRESS NOTES
Dr. Dmitriy Caba clamped EVD while rounding on patient at 11:20. Verbally stating orders to follow. Quan Orozco notified at 11:26  that ICP increasing ranging from 25-30. Per Quan Orozco open drain.

## 2021-09-02 NOTE — CARE COORDINATION
SBIRT deferred - pt with trach, TBI            Alcohol Screening and Brief Intervention          Deferred [x]     Completed on: 9/2/2021   TRISH Mariee

## 2021-09-02 NOTE — CARE COORDINATION
Received call from Marcela at [facility], she is requesting a copy of patient's insurance card be faxed to her before patient can come to their facility. No copy of pt's insurance card found in chart. Called and left VM for pt's mother stating we need copy of patient's insurance card to send to [facility].

## 2021-09-02 NOTE — PLAN OF CARE
Problem: OXYGENATION/RESPIRATORY FUNCTION  Goal: Patient will maintain patent airway  9/2/2021 0754 by Nile Jc RCP  Outcome: Ongoing     Problem: OXYGENATION/RESPIRATORY FUNCTION  Goal: Patient will achieve/maintain normal respiratory rate/effort  Description: Respiratory rate and effort will be within normal limits for the patient  9/2/2021 0754 by Nile Jc RCP  Outcome: Ongoing     Problem: MECHANICAL VENTILATION  Goal: Patient will maintain patent airway  9/2/2021 0754 by Nile Jc RCP  Outcome: Ongoing     Problem: MECHANICAL VENTILATION  Goal: Oral health is maintained or improved  9/2/2021 0754 by Nile Jc RCP  Outcome: Ongoing       Problem: MECHANICAL VENTILATION  Goal: Ability to express needs and understand communication  9/2/2021 0754 by Nile Jc RCP  Outcome: Ongoing     Problem: MECHANICAL VENTILATION  Goal: Mobility/activity is maintained at optimum level for patient  9/2/2021 0754 by Nile Jc RCP  Outcome: Ongoing     Problem: MECHANICAL VENTILATION  Goal: Tracheostomy will be managed safely  9/2/2021 0754 by Nile Jc RCP  Outcome: Ongoing     Problem: SKIN INTEGRITY  Goal: Skin integrity is maintained or improved  9/2/2021 0754 by Nile Jc RCP  Outcome: Ongoing

## 2021-09-03 LAB
APPEARANCE CSF: ABNORMAL
GLUCOSE BLD-MCNC: 107 MG/DL (ref 65–105)
GLUCOSE, CSF: 71 MG/DL (ref 40–70)
PROTEIN CSF: 35.7 MG/DL (ref 15–45)
RBC CSF: 3000 /MM3
SUPERNAT COLOR CSF: ABNORMAL
TUBE NUMBER CSF: ABNORMAL
VOLUME CSF: 2
WBC CSF: 10 /MM3
XANTHOCHROMIA: PRESENT

## 2021-09-03 PROCEDURE — 94761 N-INVAS EAR/PLS OXIMETRY MLT: CPT

## 2021-09-03 PROCEDURE — 2580000003 HC RX 258: Performed by: STUDENT IN AN ORGANIZED HEALTH CARE EDUCATION/TRAINING PROGRAM

## 2021-09-03 PROCEDURE — 84157 ASSAY OF PROTEIN OTHER: CPT

## 2021-09-03 PROCEDURE — APPSS30 APP SPLIT SHARED TIME 16-30 MINUTES: Performed by: PHYSICIAN ASSISTANT

## 2021-09-03 PROCEDURE — 6360000002 HC RX W HCPCS: Performed by: STUDENT IN AN ORGANIZED HEALTH CARE EDUCATION/TRAINING PROGRAM

## 2021-09-03 PROCEDURE — 94003 VENT MGMT INPAT SUBQ DAY: CPT

## 2021-09-03 PROCEDURE — 6370000000 HC RX 637 (ALT 250 FOR IP): Performed by: STUDENT IN AN ORGANIZED HEALTH CARE EDUCATION/TRAINING PROGRAM

## 2021-09-03 PROCEDURE — 99291 CRITICAL CARE FIRST HOUR: CPT | Performed by: NEUROLOGICAL SURGERY

## 2021-09-03 PROCEDURE — 82945 GLUCOSE OTHER FLUID: CPT

## 2021-09-03 PROCEDURE — 87205 SMEAR GRAM STAIN: CPT

## 2021-09-03 PROCEDURE — 89051 BODY FLUID CELL COUNT: CPT

## 2021-09-03 PROCEDURE — 6370000000 HC RX 637 (ALT 250 FOR IP): Performed by: NURSE PRACTITIONER

## 2021-09-03 PROCEDURE — 2000000000 HC ICU R&B

## 2021-09-03 PROCEDURE — 87015 SPECIMEN INFECT AGNT CONCNTJ: CPT

## 2021-09-03 PROCEDURE — 82947 ASSAY GLUCOSE BLOOD QUANT: CPT

## 2021-09-03 PROCEDURE — 97110 THERAPEUTIC EXERCISES: CPT

## 2021-09-03 PROCEDURE — 87070 CULTURE OTHR SPECIMN AEROBIC: CPT

## 2021-09-03 PROCEDURE — 2700000000 HC OXYGEN THERAPY PER DAY

## 2021-09-03 RX ADMIN — SODIUM CHLORIDE, PRESERVATIVE FREE 10 ML: 5 INJECTION INTRAVENOUS at 08:57

## 2021-09-03 RX ADMIN — CHLORHEXIDINE GLUCONATE 0.12% ORAL RINSE 15 ML: 1.2 LIQUID ORAL at 20:08

## 2021-09-03 RX ADMIN — PROPRANOLOL HYDROCHLORIDE 10 MG: 10 TABLET ORAL at 08:56

## 2021-09-03 RX ADMIN — ACETAMINOPHEN 1000 MG: 500 TABLET ORAL at 22:06

## 2021-09-03 RX ADMIN — FAMOTIDINE 20 MG: 20 TABLET, FILM COATED ORAL at 20:08

## 2021-09-03 RX ADMIN — PROPRANOLOL HYDROCHLORIDE 10 MG: 10 TABLET ORAL at 15:50

## 2021-09-03 RX ADMIN — PROPRANOLOL HYDROCHLORIDE 10 MG: 10 TABLET ORAL at 20:11

## 2021-09-03 RX ADMIN — AMANTADINE HYDROCHLORIDE 100 MG: 50 SOLUTION ORAL at 08:55

## 2021-09-03 RX ADMIN — ENOXAPARIN SODIUM 30 MG: 30 INJECTION SUBCUTANEOUS at 08:55

## 2021-09-03 RX ADMIN — CHLORHEXIDINE GLUCONATE 0.12% ORAL RINSE 15 ML: 1.2 LIQUID ORAL at 08:30

## 2021-09-03 RX ADMIN — MAGNESIUM GLUCONATE 500 MG ORAL TABLET 400 MG: 500 TABLET ORAL at 15:50

## 2021-09-03 RX ADMIN — MAGNESIUM GLUCONATE 500 MG ORAL TABLET 400 MG: 500 TABLET ORAL at 09:02

## 2021-09-03 RX ADMIN — FAMOTIDINE 20 MG: 20 TABLET, FILM COATED ORAL at 08:55

## 2021-09-03 RX ADMIN — ENOXAPARIN SODIUM 30 MG: 30 INJECTION SUBCUTANEOUS at 20:08

## 2021-09-03 RX ADMIN — AMANTADINE HYDROCHLORIDE 100 MG: 50 SOLUTION ORAL at 15:49

## 2021-09-03 RX ADMIN — ACETAMINOPHEN 1000 MG: 500 TABLET ORAL at 05:01

## 2021-09-03 RX ADMIN — ACETAMINOPHEN 1000 MG: 500 TABLET ORAL at 16:00

## 2021-09-03 RX ADMIN — MAGNESIUM GLUCONATE 500 MG ORAL TABLET 400 MG: 500 TABLET ORAL at 20:08

## 2021-09-03 ASSESSMENT — PULMONARY FUNCTION TESTS
PIF_VALUE: 11
PIF_VALUE: 16
PIF_VALUE: 10
PIF_VALUE: 11

## 2021-09-03 NOTE — CARE COORDINATION
Transitional Planning:    Pt has trach vent- fio2 30%. Pt is off of LTME, but still has EVD as clamp trial failed yesterday. Pt still needs a PEG. [facility] is able to accept once the EVD is pulled and PEG is placed. Awaiting copy of insurance card from family to send to [facility]. [phone]- received message from Anita Grijalva- called her back and stated that pt failed clamp trial of EVD today as well. Anita Grijalva from [facility] stated that they cannot accept the EVD, but pt does not need to have PEG tube in order to come there.
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
## 2021-09-03 NOTE — PLAN OF CARE
Problem: OXYGENATION/RESPIRATORY FUNCTION  Goal: Patient will maintain patent airway  9/3/2021 1712 by Corinna Romo, RCP  Outcome: Ongoing  9/3/2021 0433 by Jeffrey Padgett RN  Outcome: Ongoing  Goal: Patient will achieve/maintain normal respiratory rate/effort  Description: Respiratory rate and effort will be within normal limits for the patient  9/3/2021 1712 by Corinna Romo, RCP  Outcome: Ongoing  9/3/2021 0433 by Jeffrey Padgett RN  Outcome: Ongoing     Problem: MECHANICAL VENTILATION  Goal: Patient will maintain patent airway  9/3/2021 1712 by Corinna Romo, RCP  Outcome: Ongoing  9/3/2021 0433 by Jeffrey Padgett RN  Outcome: Ongoing  Goal: Oral health is maintained or improved  9/3/2021 1712 by Corinna Romo, RCP  Outcome: Ongoing  9/3/2021 0433 by Jeffrey Padgett RN  Outcome: Ongoing  Goal: Ability to express needs and understand communication  9/3/2021 1712 by Corinna Romo, RCP  Outcome: Ongoing  9/3/2021 0433 by Jeffrey Padgett RN  Outcome: Ongoing  Goal: Mobility/activity is maintained at optimum level for patient  9/3/2021 1712 by Corinna Romo, RCP  Outcome: Ongoing  9/3/2021 0433 by Jeffrey Padgett RN  Outcome: Ongoing  Goal: Tracheostomy will be managed safely  9/3/2021 1712 by Corinna Romo, RCP  Outcome: Ongoing  9/3/2021 0433 by Jeffrey Padgett RN  Outcome: Ongoing     Problem: SKIN INTEGRITY  Goal: Skin integrity is maintained or improved  9/3/2021 1712 by Corinna Romo, RCP  Outcome: Ongoing  9/3/2021 0433 by Jeffrey Padgett RN  Outcome: Ongoing

## 2021-09-03 NOTE — PROGRESS NOTES
Clamp trial started w/ Dr. Dmitriy Caba at bedside @ 1330 - Pt laid flat and tolerated until [phone] when ICP's were sustaining in the mid to high 30's - Pt agitated at this time also.  - Trial stopped and patient sat up and drained set to 10 mmhg per Order and opened to drain - ICP returned to normal ranges and neuro status unchanged post trial

## 2021-09-03 NOTE — PROGRESS NOTES
Physical Therapy  DATE: 9/3/2021  NAME: Clarita Barrera  MRN: [id number]   : 1985    Discharge Recommendations: Continue to Assess (pending progress)     Subjective: Pt resting in bed upon arrival, appears comfortable  Pain: JORGE  Patient follows: No Commands  Is patient on ventilator: Yes  Is patient on sedation: Yes  Precautions: EVD, ART line    Therapeutic exercises:  UE/LE(s)  Bilateral Passive range of motion all planes x 10 reps  bilateral gastrocnemius stretching 2 reps x 30 seconds    Goals  Short Term Goals  Short term goal 1: Prevent contractures through ROM and stretching  Short term goal 2: Pt to follow some commands for active exercise. Short term goal 3: Progress with mobility as appropriate          Plan: Progress functional mobility as medically appropriate.    Time In: 1120  Time Out: 1136  Time Coded Minutes (treatment minutes): 16  Rehab Potential: Fair  Treatments/week: 2-3x/wk    Temitope Oakley PTA

## 2021-09-03 NOTE — PLAN OF CARE
Problem: OXYGENATION/RESPIRATORY FUNCTION  Goal: Patient will maintain patent airway  9/3/2021 1956 by Prateek Haney RCP  Outcome: Ongoing     Problem: OXYGENATION/RESPIRATORY FUNCTION  Goal: Patient will achieve/maintain normal respiratory rate/effort  Description: Respiratory rate and effort will be within normal limits for the patient  9/3/2021 1956 by Nicholas Haney RCP  Outcome: Ongoing     Problem: MECHANICAL VENTILATION  Goal: Patient will maintain patent airway  9/3/2021 1956 by Prateek Haney RCP  Outcome: Ongoing     Problem: MECHANICAL VENTILATION  Goal: Oral health is maintained or improved  9/3/2021 1956 by Nicholas Haney RCP  Outcome: Ongoing     Problem: MECHANICAL VENTILATION  Goal: Ability to express needs and understand communication  9/3/2021 1956 by Lai Bowers RCP  Outcome: Ongoing     Problem: MECHANICAL VENTILATION  Goal: Mobility/activity is maintained at optimum level for patient  9/3/2021 1956 by Nicholas Haney RCP  Outcome: Ongoing     Problem: MECHANICAL VENTILATION  Goal: Tracheostomy will be managed safely  9/3/2021 1956 by Nicholas Haney RCP  Outcome: Ongoing     Problem: SKIN INTEGRITY  Goal: Skin integrity is maintained or improved  9/3/2021 1956 by Prateek Haney RCP  Outcome: Ongoing

## 2021-09-03 NOTE — PLAN OF CARE
Problem: Non-Violent Restraints  Goal: Removal from restraints as soon as assessed to be safe  9/3/2021 1804 by Satish Sears RN  Outcome: Ongoing  9/3/2021 0621 by Alanna Galvan RN  Outcome: Ongoing     Problem: Non-Violent Restraints  Goal: No harm/injury to patient while restraints in use  9/3/2021 1804 by Satish Sears RN  Outcome: Ongoing  9/3/2021 0621 by Alanna Galvan RN  Outcome: Met This Shift     Problem: Non-Violent Restraints  Goal: Patient's dignity will be maintained  9/3/2021 1804 by Satish Sears RN  Outcome: Ongoing  9/3/2021 0621 by Alanna Galvan RN  Outcome: Met This Shift

## 2021-09-03 NOTE — PLAN OF CARE
Problem: Non-Violent Restraints  Goal: No harm/injury to patient while restraints in use  Outcome: Met This Shift  Goal: Patient's dignity will be maintained  Outcome: Met This Shift

## 2021-09-04 LAB
ABSOLUTE EOS #: 0.34 K/UL (ref 0–0.44)
ABSOLUTE IMMATURE GRANULOCYTE: 0.19 K/UL (ref 0–0.3)
ABSOLUTE LYMPH #: 1.95 K/UL (ref 1.1–3.7)
ABSOLUTE MONO #: 0.8 K/UL (ref 0.1–1.2)
ANION GAP SERPL CALCULATED.3IONS-SCNC: 11 MMOL/L (ref 9–17)
BASOPHILS # BLD: 1 % (ref 0–2)
BASOPHILS ABSOLUTE: 0.1 K/UL (ref 0–0.2)
BUN BLDV-MCNC: 17 MG/DL (ref 6–20)
BUN/CREAT BLD: ABNORMAL (ref 9–20)
CALCIUM SERPL-MCNC: 9.2 MG/DL (ref 8.6–10.4)
CHLORIDE BLD-SCNC: 101 MMOL/L (ref 98–107)
CO2: 25 MMOL/L (ref 20–31)
CREAT SERPL-MCNC: 0.32 MG/DL (ref 0.5–0.9)
DIFFERENTIAL TYPE: ABNORMAL
EOSINOPHILS RELATIVE PERCENT: 2 % (ref 1–4)
GFR AFRICAN AMERICAN: >60 ML/MIN
GFR NON-AFRICAN AMERICAN: >60 ML/MIN
GFR SERPL CREATININE-BSD FRML MDRD: ABNORMAL ML/MIN/{1.73_M2}
GFR SERPL CREATININE-BSD FRML MDRD: ABNORMAL ML/MIN/{1.73_M2}
GLUCOSE BLD-MCNC: 119 MG/DL (ref 70–99)
HCT VFR BLD CALC: 31.7 % (ref 36.3–47.1)
HEMOGLOBIN: 10.2 G/DL (ref 11.9–15.1)
IMMATURE GRANULOCYTES: 1 %
LYMPHOCYTES # BLD: 9 % (ref 24–43)
MCH RBC QN AUTO: 28.1 PG (ref 25.2–33.5)
MCHC RBC AUTO-ENTMCNC: 32.2 G/DL (ref 28.4–34.8)
MCV RBC AUTO: 87.3 FL (ref 82.6–102.9)
MONOCYTES # BLD: 4 % (ref 3–12)
NRBC AUTOMATED: 0 PER 100 WBC
PDW BLD-RTO: 12.6 % (ref 11.8–14.4)
PLATELET # BLD: 567 K/UL (ref 138–453)
PLATELET ESTIMATE: ABNORMAL
PMV BLD AUTO: 8.7 FL (ref 8.1–13.5)
POTASSIUM SERPL-SCNC: 4.4 MMOL/L (ref 3.7–5.3)
RBC # BLD: 3.63 M/UL (ref 3.95–5.11)
RBC # BLD: ABNORMAL 10*6/UL
SEG NEUTROPHILS: 85 % (ref 36–65)
SEGMENTED NEUTROPHILS ABSOLUTE COUNT: 18.65 K/UL (ref 1.5–8.1)
SODIUM BLD-SCNC: 137 MMOL/L (ref 135–144)
WBC # BLD: 22 K/UL (ref 3.5–11.3)
WBC # BLD: ABNORMAL 10*3/UL

## 2021-09-04 PROCEDURE — 6360000002 HC RX W HCPCS: Performed by: STUDENT IN AN ORGANIZED HEALTH CARE EDUCATION/TRAINING PROGRAM

## 2021-09-04 PROCEDURE — 76937 US GUIDE VASCULAR ACCESS: CPT

## 2021-09-04 PROCEDURE — 94003 VENT MGMT INPAT SUBQ DAY: CPT

## 2021-09-04 PROCEDURE — 2580000003 HC RX 258: Performed by: STUDENT IN AN ORGANIZED HEALTH CARE EDUCATION/TRAINING PROGRAM

## 2021-09-04 PROCEDURE — 36415 COLL VENOUS BLD VENIPUNCTURE: CPT

## 2021-09-04 PROCEDURE — 99233 SBSQ HOSP IP/OBS HIGH 50: CPT | Performed by: NEUROLOGICAL SURGERY

## 2021-09-04 PROCEDURE — 80048 BASIC METABOLIC PNL TOTAL CA: CPT

## 2021-09-04 PROCEDURE — 2000000000 HC ICU R&B

## 2021-09-04 PROCEDURE — 6370000000 HC RX 637 (ALT 250 FOR IP): Performed by: STUDENT IN AN ORGANIZED HEALTH CARE EDUCATION/TRAINING PROGRAM

## 2021-09-04 PROCEDURE — 6370000000 HC RX 637 (ALT 250 FOR IP): Performed by: NURSE PRACTITIONER

## 2021-09-04 PROCEDURE — 94761 N-INVAS EAR/PLS OXIMETRY MLT: CPT

## 2021-09-04 PROCEDURE — 85025 COMPLETE CBC W/AUTO DIFF WBC: CPT

## 2021-09-04 PROCEDURE — 2700000000 HC OXYGEN THERAPY PER DAY

## 2021-09-04 RX ADMIN — PROPRANOLOL HYDROCHLORIDE 10 MG: 10 TABLET ORAL at 13:27

## 2021-09-04 RX ADMIN — FAMOTIDINE 20 MG: 20 TABLET, FILM COATED ORAL at 08:25

## 2021-09-04 RX ADMIN — AMANTADINE HYDROCHLORIDE 100 MG: 50 SOLUTION ORAL at 08:25

## 2021-09-04 RX ADMIN — FAMOTIDINE 20 MG: 20 TABLET, FILM COATED ORAL at 20:57

## 2021-09-04 RX ADMIN — PROPRANOLOL HYDROCHLORIDE 10 MG: 10 TABLET ORAL at 08:25

## 2021-09-04 RX ADMIN — SODIUM CHLORIDE, PRESERVATIVE FREE 10 ML: 5 INJECTION INTRAVENOUS at 20:57

## 2021-09-04 RX ADMIN — AMANTADINE HYDROCHLORIDE 100 MG: 50 SOLUTION ORAL at 13:27

## 2021-09-04 RX ADMIN — MAGNESIUM GLUCONATE 500 MG ORAL TABLET 400 MG: 500 TABLET ORAL at 08:25

## 2021-09-04 RX ADMIN — ENOXAPARIN SODIUM 30 MG: 30 INJECTION SUBCUTANEOUS at 08:23

## 2021-09-04 RX ADMIN — ACETAMINOPHEN 1000 MG: 500 TABLET ORAL at 06:02

## 2021-09-04 RX ADMIN — MAGNESIUM GLUCONATE 500 MG ORAL TABLET 400 MG: 500 TABLET ORAL at 20:57

## 2021-09-04 RX ADMIN — SODIUM CHLORIDE, PRESERVATIVE FREE 10 ML: 5 INJECTION INTRAVENOUS at 08:21

## 2021-09-04 RX ADMIN — ACETAMINOPHEN 1000 MG: 500 TABLET ORAL at 13:29

## 2021-09-04 RX ADMIN — MAGNESIUM GLUCONATE 500 MG ORAL TABLET 400 MG: 500 TABLET ORAL at 13:27

## 2021-09-04 RX ADMIN — ACETAMINOPHEN 1000 MG: 500 TABLET ORAL at 21:00

## 2021-09-04 RX ADMIN — PROPRANOLOL HYDROCHLORIDE 10 MG: 10 TABLET ORAL at 20:57

## 2021-09-04 RX ADMIN — ENOXAPARIN SODIUM 30 MG: 30 INJECTION SUBCUTANEOUS at 20:57

## 2021-09-04 RX ADMIN — CHLORHEXIDINE GLUCONATE 0.12% ORAL RINSE 15 ML: 1.2 LIQUID ORAL at 20:57

## 2021-09-04 ASSESSMENT — PULMONARY FUNCTION TESTS
PIF_VALUE: 11
PIF_VALUE: 10

## 2021-09-04 NOTE — PLAN OF CARE
Problem: Non-Violent Restraints  Goal: No harm/injury to patient while restraints in use  9/4/2021 1445 by Slick Wall RN  Outcome: Met This Shift     Problem: Non-Violent Restraints  Goal: Patient's dignity will be maintained  9/4/2021 1445 by Slick Wall RN  Outcome: Met This Shift     Problem: SKIN INTEGRITY  Goal: Skin integrity is maintained or improved  9/4/2021 1445 by Slick Wall RN  Outcome: Ongoing     Problem: Confusion - Acute:  Goal: Absence of continued neurological deterioration signs and symptoms  Description: Absence of continued neurological deterioration signs and symptoms  9/4/2021 1445 by Slick Wall RN  Outcome: Ongoing     Problem: Injury - Risk of, Physical Injury:  Goal: Absence of physical injury  Description: Absence of physical injury  9/4/2021 1445 by Slick Wall RN  Outcome: Ongoing     Problem: Mood - Altered:  Goal: Mood stable  Description: Mood stable  9/4/2021 1445 by Slick Wall RN  Outcome: Ongoing     Problem: Skin Integrity:  Goal: Will show no infection signs and symptoms  Description: Will show no infection signs and symptoms  9/4/2021 1445 by Slick Wall RN  Outcome: Ongoing     Problem: Falls - Risk of:  Goal: Absence of physical injury  Description: Absence of physical injury  9/4/2021 1445 by Slick Wall RN  Outcome: Ongoing     Problem: Non-Violent Restraints  Goal: Removal from restraints as soon as assessed to be safe  9/4/2021 1445 by Slick Wall RN  Outcome: Ongoing     Problem: Nutrition  Goal: Optimal nutrition therapy  9/4/2021 1445 by Slick Wall RN  Outcome: Ongoing
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
## 2021-09-04 NOTE — PLAN OF CARE
Problem: OXYGENATION/RESPIRATORY FUNCTION  Goal: Patient will maintain patent airway  9/4/2021 0744 by Yanelis Romeo RCP  Outcome: Ongoing  9/3/2021 1956 by Bakari Haney RCP  Outcome: Ongoing  Goal: Patient will achieve/maintain normal respiratory rate/effort  Description: Respiratory rate and effort will be within normal limits for the patient  9/4/2021 0744 by Yanelis Romeo RCP  Outcome: Ongoing  9/3/2021 1956 by Bakari Haney RCP  Outcome: Ongoing     Problem: MECHANICAL VENTILATION  Goal: Patient will maintain patent airway  9/4/2021 0744 by Yanelis Romeo RCP  Outcome: Ongoing  9/3/2021 1956 by Peña Buck RCP  Outcome: Ongoing  Goal: Oral health is maintained or improved  9/4/2021 0744 by Yanelis Romeo RCP  Outcome: Ongoing  9/3/2021 1956 by Peña Buck RCP  Outcome: Ongoing  Goal: Ability to express needs and understand communication  9/4/2021 0744 by Yanelis Romeo RCP  Outcome: Ongoing  9/3/2021 1956 by Peña Buck RCP  Outcome: Ongoing  Goal: Mobility/activity is maintained at optimum level for patient  9/4/2021 0744 by Yanelis Romeo RCP  Outcome: Ongoing  9/3/2021 1956 by Peña Buck RCP  Outcome: Ongoing  Goal: Tracheostomy will be managed safely  9/4/2021 0744 by Yanelis Romeo RCP  Outcome: Ongoing  9/3/2021 1956 by Bakari Haney RCP  Outcome: Ongoing

## 2021-09-04 NOTE — PLAN OF CARE
Problem: OXYGENATION/RESPIRATORY FUNCTION  Goal: Patient will maintain patent airway  9/4/2021 1934 by Mauri Haney RCP  Outcome: Ongoing     Problem: OXYGENATION/RESPIRATORY FUNCTION  Goal: Patient will achieve/maintain normal respiratory rate/effort  Description: Respiratory rate and effort will be within normal limits for the patient  9/4/2021 1934 by Myriam Haney RCP  Outcome: Ongoing     Problem: MECHANICAL VENTILATION  Goal: Patient will maintain patent airway  9/4/2021 1934 by Mauri Haney RCP  Outcome: Ongoing     Problem: MECHANICAL VENTILATION  Goal: Oral health is maintained or improved  9/4/2021 1934 by Myriam Haney RCP  Outcome: Ongoing     Problem: MECHANICAL VENTILATION  Goal: Ability to express needs and understand communication  9/4/2021 1934 by Kari Lacey RCP  Outcome: Ongoing     Problem: MECHANICAL VENTILATION  Goal: Mobility/activity is maintained at optimum level for patient  9/4/2021 1934 by Myriam Haney RCP  Outcome: Ongoing     Problem: MECHANICAL VENTILATION  Goal: Tracheostomy will be managed safely  9/4/2021 1934 by Myriam Haney RCP  Outcome: Ongoing     Problem: SKIN INTEGRITY  Goal: Skin integrity is maintained or improved  9/4/2021 1934 by Kari Lacey RCP  Outcome: Ongoing

## 2021-09-04 NOTE — PROGRESS NOTES
Neurosurgery Service  Resident Daily Progress Note   9/4/2021 7:06 AM     Subjective    Patient seen and examined at the bed side, no acute events overnight. No new complaints. EVD clamp trial failed yesterday  EVD drain 297 overnight,   with ICP ranges from 8-12    Objective    Vitals:    09/04/21 0500 09/04/21 0530 09/04/21 0600 09/04/21 0630   BP: (!) 110/56  105/61    Pulse: 78 83 70 70   Resp: 17 16 25 21   Temp:   98.9 °F (37.2 °C)    TempSrc:   Oral    SpO2: 100% 100% 100%    Weight:       Height:           Physical Exam:  Gen: Intubated,   CV: RRR  Resp: CTAB  GI: Abdomen soft, non-tender  Skin: Cap refill< 2 seconds, surgical incision    Neuro:    Intubated, not on sedation  Does not open eyes or follows,  E1 V1T M 5    Localizes to pain bilateral upper extremity,   withdraws to pain bilateral lower extremity  Labs:  Lab Results   Component Value Date    WBC 12.5 (H) 09/02/2021    HGB 8.3 (L) 09/02/2021    HCT 25.6 (L) 09/02/2021     09/02/2021    TRIG 74 08/28/2021     09/02/2021    K 4.0 09/02/2021     (H) 09/02/2021    CREATININE 0.26 (L) 09/02/2021    BUN 14 09/02/2021    CO2 22 09/02/2021    INR 1.1 08/24/2021       Radiology (last 24 hours): No new studies    ASSESSMENT & PLAN:    Chicho Sidhu is a 38 y.o. female who presents with right-sided SDH      Severe TBI  POD 9 s/p craniotomy  EVD day 8  Continue EVD open at 10  Monitor ICP and output hourly  Clamp trial early next week    Please contact neurosurgery with any changes in patient's neurologic status.       Quan Dejesus MD  PGY-3 Neurology Resident Physician  Neurosurgery/Neuro Critical Care Team  Pager [phone]

## 2021-09-04 NOTE — PLAN OF CARE
Problem: Non-Violent Restraints  Goal: Removal from restraints as soon as assessed to be safe  9/4/2021 0751 by Gina Tamayo RN  Outcome: Met This Shift  9/3/2021 1804 by Nomi Dominguez RN  Outcome: Ongoing  Goal: No harm/injury to patient while restraints in use  9/4/2021 0751 by Gina Tamayo RN  Outcome: Met This Shift  9/3/2021 1804 by Nomi Dominguez RN  Outcome: Ongoing  Goal: Patient's dignity will be maintained  9/4/2021 0751 by Gina Tamayo RN  Outcome: Met This Shift  9/3/2021 1804 by Nomi Dominguez RN  Outcome: Ongoing     Problem: OXYGENATION/RESPIRATORY FUNCTION  Goal: Patient will maintain patent airway  9/4/2021 0751 by Gina Tamayo RN  Outcome: Ongoing  9/4/2021 0744 by Douglas Pacheco RCP  Outcome: Ongoing  9/3/2021 1956 by Mary Haney RCP  Outcome: Ongoing  Goal: Patient will achieve/maintain normal respiratory rate/effort  Description: Respiratory rate and effort will be within normal limits for the patient  9/4/2021 0751 by Gina Tamayo RN  Outcome: Ongoing  9/4/2021 0744 by Douglas Pacheoc RCP  Outcome: Ongoing  9/3/2021 1956 by Mary Haney RCP  Outcome: Ongoing     Problem: MECHANICAL VENTILATION  Goal: Patient will maintain patent airway  9/4/2021 0751 by Gina Tamayo RN  Outcome: Ongoing  9/4/2021 0744 by Douglas Pacheco RCP  Outcome: Ongoing  9/3/2021 1956 by Shauna Mae RCP  Outcome: Ongoing  Goal: Oral health is maintained or improved  9/4/2021 0751 by Gina Tamayo RN  Outcome: Ongoing  9/4/2021 0744 by Douglas Pacheco RCP  Outcome: Ongoing  9/3/2021 1956 by Shauna Mae RCP  Outcome: Ongoing  Goal: Ability to express needs and understand communication  9/4/2021 0751 by Gina Tamayo RN  Outcome: Ongoing  9/4/2021 0744 by Douglas Pacheco RCP  Outcome: Ongoing  9/3/2021 1956 by Shauna Mae RCP  Outcome: Ongoing  Goal: Mobility/activity is maintained at optimum level for patient  9/4/2021 0751 by Gina Tamayo RN  Outcome: Ongoing  9/4/2021 9865 by Chuy Ward RCP  Outcome: Ongoing  9/3/2021 1956 by Young Haney RCP  Outcome: Ongoing  Goal: Tracheostomy will be managed safely  9/4/2021 0751 by Hong Hernandez RN  Outcome: Ongoing  9/4/2021 0744 by Chuy Ward RCP  Outcome: Ongoing  9/3/2021 1956 by Young Haney RCP  Outcome: Ongoing     Problem: SKIN INTEGRITY  Goal: Skin integrity is maintained or improved  9/4/2021 0751 by Hong Hernandez RN  Outcome: Ongoing  9/4/2021 0744 by Chuy Ward RCP  Outcome: Ongoing  9/3/2021 1956 by Young Haney RCP  Outcome: Ongoing     Problem: Confusion - Acute:  Goal: Absence of continued neurological deterioration signs and symptoms  Description: Absence of continued neurological deterioration signs and symptoms  Outcome: Ongoing  Goal: Mental status will be restored to baseline  Description: Mental status will be restored to baseline  Outcome: Ongoing     Problem: Discharge Planning:  Goal: Ability to perform activities of daily living will improve  Description: Ability to perform activities of daily living will improve  Outcome: Ongoing  Goal: Participates in care planning  Description: Participates in care planning  Outcome: Ongoing     Problem: Injury - Risk of, Physical Injury:  Goal: Absence of physical injury  Description: Absence of physical injury  Outcome: Ongoing  Goal: Will remain free from falls  Description: Will remain free from falls  Outcome: Ongoing     Problem: Mood - Altered:  Goal: Mood stable  Description: Mood stable  Outcome: Ongoing  Goal: Absence of abusive behavior  Description: Absence of abusive behavior  Outcome: Ongoing  Goal: Verbalizations of feeling emotionally comfortable while being cared for will increase  Description: Verbalizations of feeling emotionally comfortable while being cared for will increase  Outcome: Ongoing     Problem: Psychomotor Activity - Altered:  Goal: Absence of psychomotor disturbance signs and symptoms  Description: Absence of psychomotor disturbance signs and symptoms  Outcome: Ongoing     Problem: Sensory Perception - Impaired:  Goal: Demonstrations of improved sensory functioning will increase  Description: Demonstrations of improved sensory functioning will increase  Outcome: Ongoing  Goal: Decrease in sensory misperception frequency  Description: Decrease in sensory misperception frequency  Outcome: Ongoing  Goal: Able to refrain from responding to false sensory perceptions  Description: Able to refrain from responding to false sensory perceptions  Outcome: Ongoing  Goal: Demonstrates accurate environmental perceptions  Description: Demonstrates accurate environmental perceptions  Outcome: Ongoing  Goal: Able to distinguish between reality-based and nonreality-based thinking  Description: Able to distinguish between reality-based and nonreality-based thinking  Outcome: Ongoing  Goal: Able to interrupt nonreality-based thinking  Description: Able to interrupt nonreality-based thinking  Outcome: Ongoing     Problem: Sleep Pattern Disturbance:  Goal: Appears well-rested  Description: Appears well-rested  Outcome: Ongoing     Problem: Skin Integrity:  Goal: Will show no infection signs and symptoms  Description: Will show no infection signs and symptoms  Outcome: Ongoing  Goal: Absence of new skin breakdown  Description: Absence of new skin breakdown  Outcome: Ongoing     Problem: Falls - Risk of:  Goal: Absence of physical injury  Description: Absence of physical injury  Outcome: Ongoing  Goal: Will remain free from falls  Description: Will remain free from falls  Outcome: Ongoing     Problem: Nutrition  Goal: Optimal nutrition therapy  Outcome: Ongoing

## 2021-09-05 ENCOUNTER — APPOINTMENT (OUTPATIENT)
Dept: GENERAL RADIOLOGY | Age: 36
DRG: 003 | End: 2021-09-05
Payer: COMMERCIAL

## 2021-09-05 LAB
ABSOLUTE EOS #: 0.3 K/UL (ref 0–0.44)
ABSOLUTE IMMATURE GRANULOCYTE: 0.15 K/UL (ref 0–0.3)
ABSOLUTE LYMPH #: 1.79 K/UL (ref 1.1–3.7)
ABSOLUTE MONO #: 0.97 K/UL (ref 0.1–1.2)
BASOPHILS # BLD: 0 % (ref 0–2)
BASOPHILS ABSOLUTE: 0.07 K/UL (ref 0–0.2)
DIFFERENTIAL TYPE: ABNORMAL
EOSINOPHILS RELATIVE PERCENT: 1 % (ref 1–4)
HCT VFR BLD CALC: 33.1 % (ref 36.3–47.1)
HEMOGLOBIN: 10.6 G/DL (ref 11.9–15.1)
IMMATURE GRANULOCYTES: 1 %
LYMPHOCYTES # BLD: 9 % (ref 24–43)
MCH RBC QN AUTO: 27.8 PG (ref 25.2–33.5)
MCHC RBC AUTO-ENTMCNC: 32 G/DL (ref 28.4–34.8)
MCV RBC AUTO: 86.9 FL (ref 82.6–102.9)
MONOCYTES # BLD: 5 % (ref 3–12)
NRBC AUTOMATED: 0 PER 100 WBC
PDW BLD-RTO: 12.8 % (ref 11.8–14.4)
PLATELET # BLD: 627 K/UL (ref 138–453)
PLATELET ESTIMATE: ABNORMAL
PMV BLD AUTO: 8.9 FL (ref 8.1–13.5)
RBC # BLD: 3.81 M/UL (ref 3.95–5.11)
RBC # BLD: ABNORMAL 10*6/UL
SEG NEUTROPHILS: 84 % (ref 36–65)
SEGMENTED NEUTROPHILS ABSOLUTE COUNT: 17.5 K/UL (ref 1.5–8.1)
WBC # BLD: 20.8 K/UL (ref 3.5–11.3)
WBC # BLD: ABNORMAL 10*3/UL

## 2021-09-05 PROCEDURE — 6370000000 HC RX 637 (ALT 250 FOR IP): Performed by: NURSE PRACTITIONER

## 2021-09-05 PROCEDURE — 6370000000 HC RX 637 (ALT 250 FOR IP): Performed by: STUDENT IN AN ORGANIZED HEALTH CARE EDUCATION/TRAINING PROGRAM

## 2021-09-05 PROCEDURE — 94003 VENT MGMT INPAT SUBQ DAY: CPT

## 2021-09-05 PROCEDURE — 36415 COLL VENOUS BLD VENIPUNCTURE: CPT

## 2021-09-05 PROCEDURE — 2580000003 HC RX 258: Performed by: STUDENT IN AN ORGANIZED HEALTH CARE EDUCATION/TRAINING PROGRAM

## 2021-09-05 PROCEDURE — 85025 COMPLETE CBC W/AUTO DIFF WBC: CPT

## 2021-09-05 PROCEDURE — 2700000000 HC OXYGEN THERAPY PER DAY

## 2021-09-05 PROCEDURE — 6360000002 HC RX W HCPCS: Performed by: STUDENT IN AN ORGANIZED HEALTH CARE EDUCATION/TRAINING PROGRAM

## 2021-09-05 PROCEDURE — 94761 N-INVAS EAR/PLS OXIMETRY MLT: CPT

## 2021-09-05 PROCEDURE — 99222 1ST HOSP IP/OBS MODERATE 55: CPT | Performed by: NURSE PRACTITIONER

## 2021-09-05 PROCEDURE — 71045 X-RAY EXAM CHEST 1 VIEW: CPT

## 2021-09-05 PROCEDURE — 2000000000 HC ICU R&B

## 2021-09-05 PROCEDURE — 87641 MR-STAPH DNA AMP PROBE: CPT

## 2021-09-05 PROCEDURE — 99232 SBSQ HOSP IP/OBS MODERATE 35: CPT | Performed by: NEUROLOGICAL SURGERY

## 2021-09-05 RX ADMIN — AMANTADINE HYDROCHLORIDE 100 MG: 50 SOLUTION ORAL at 07:58

## 2021-09-05 RX ADMIN — MAGNESIUM GLUCONATE 500 MG ORAL TABLET 400 MG: 500 TABLET ORAL at 13:39

## 2021-09-05 RX ADMIN — CHLORHEXIDINE GLUCONATE 0.12% ORAL RINSE 15 ML: 1.2 LIQUID ORAL at 21:10

## 2021-09-05 RX ADMIN — AMANTADINE HYDROCHLORIDE 100 MG: 50 SOLUTION ORAL at 13:39

## 2021-09-05 RX ADMIN — PROPRANOLOL HYDROCHLORIDE 10 MG: 10 TABLET ORAL at 21:10

## 2021-09-05 RX ADMIN — PROPRANOLOL HYDROCHLORIDE 10 MG: 10 TABLET ORAL at 13:39

## 2021-09-05 RX ADMIN — MAGNESIUM GLUCONATE 500 MG ORAL TABLET 400 MG: 500 TABLET ORAL at 07:58

## 2021-09-05 RX ADMIN — PROPRANOLOL HYDROCHLORIDE 10 MG: 10 TABLET ORAL at 07:58

## 2021-09-05 RX ADMIN — MAGNESIUM GLUCONATE 500 MG ORAL TABLET 400 MG: 500 TABLET ORAL at 21:10

## 2021-09-05 RX ADMIN — ENOXAPARIN SODIUM 30 MG: 30 INJECTION SUBCUTANEOUS at 07:57

## 2021-09-05 RX ADMIN — ACETAMINOPHEN 1000 MG: 500 TABLET ORAL at 13:41

## 2021-09-05 RX ADMIN — SODIUM CHLORIDE, PRESERVATIVE FREE 10 ML: 5 INJECTION INTRAVENOUS at 07:53

## 2021-09-05 RX ADMIN — ACETAMINOPHEN 1000 MG: 500 TABLET ORAL at 06:03

## 2021-09-05 RX ADMIN — FAMOTIDINE 20 MG: 20 TABLET, FILM COATED ORAL at 07:58

## 2021-09-05 RX ADMIN — ACETAMINOPHEN 1000 MG: 500 TABLET ORAL at 21:10

## 2021-09-05 RX ADMIN — SODIUM CHLORIDE, PRESERVATIVE FREE 10 ML: 5 INJECTION INTRAVENOUS at 21:10

## 2021-09-05 RX ADMIN — ENOXAPARIN SODIUM 30 MG: 30 INJECTION SUBCUTANEOUS at 21:10

## 2021-09-05 RX ADMIN — FAMOTIDINE 20 MG: 20 TABLET, FILM COATED ORAL at 21:10

## 2021-09-05 ASSESSMENT — PULMONARY FUNCTION TESTS
PIF_VALUE: 11
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
## 2021-09-05 NOTE — PROGRESS NOTES
09/05/21 1159   Oxygen Therapy/Pulse Ox   O2 Therapy Oxygen humidified   O2 Device T-Piece   O2 Flow Rate (L/min) 10 L/min   FiO2  40 %   Resp 19   SpO2 100 %     Patient placed on Cool aerosol T-piece. Tolerating well.

## 2021-09-05 NOTE — ACP (ADVANCE CARE PLANNING)
Advance Care Planning     Advance Care Planning (ACP) Physician/NP/PA Conversation    Date of Conversation: 8/24/2021  Conducted with:  Healthcare Decision Maker: Next of Kin by law (only applies in absence of above) (name) Mother and Father are patients decision maker    Healthcare Decision Maker:      Primary Decision Maker (Active): Dee Allen - Parent - [phone]    Primary Decision Maker: Omar Vital - Parent - [phone]    Click here to complete Healthcare Decision Makers including selection of the Healthcare Decision Maker Relationship (ie \"Primary\")  Today we documented Decision Maker(s) consistent with Legal Next of Kin hierarchy. Care Preferences:    Hospitalization: \"If your health worsens and it becomes clear that your chance of recovery is unlikely, what would be your preference regarding hospitalization? \"  The patient would prefer hospitalization. Ventilation: \"If you were unable to breath on your own and your chance of recovery was unlikely, what would be your preference about the use of a ventilator (breathing machine) if it was available to you? \"  Patient is a full code    Resuscitation: \"In the event your heart stopped as a result of an underlying serious health condition, would you want attempts made to restart your heart, or would you prefer a natural death? \"  Patient is a full code    Patient does not have a HCPOA.     Conversation Outcomes / Follow-Up Plan:  ACP complete - no further action today  Reviewed DNR/DNI and patient elects Full Code (Attempt Resuscitation)    Length of Voluntary ACP Conversation in minutes:  16 minutes    Nely Caruso APRN - CNP

## 2021-09-05 NOTE — PLAN OF CARE
Problem: Non-Violent Restraints  Goal: No harm/injury to patient while restraints in use  Outcome: Met This Shift     Problem: Non-Violent Restraints  Goal: Patient's dignity will be maintained  Outcome: Met This Shift     Problem: SKIN INTEGRITY  Goal: Skin integrity is maintained or improved  Outcome: Ongoing     Problem: Confusion - Acute:  Goal: Absence of continued neurological deterioration signs and symptoms  Description: Absence of continued neurological deterioration signs and symptoms  Outcome: Ongoing     Problem: Discharge Planning:  Goal: Ability to perform activities of daily living will improve  Description: Ability to perform activities of daily living will improve  Outcome: Ongoing     Problem: Injury - Risk of, Physical Injury:  Goal: Absence of physical injury  Description: Absence of physical injury  Outcome: Ongoing     Problem: Skin Integrity:  Goal: Will show no infection signs and symptoms  Description: Will show no infection signs and symptoms  Outcome: Ongoing     Problem: Falls - Risk of:  Goal: Absence of physical injury  Description: Absence of physical injury  Outcome: Ongoing     Problem: Non-Violent Restraints  Goal: Removal from restraints as soon as assessed to be safe  Outcome: Ongoing     Problem: Nutrition  Goal: Optimal nutrition therapy  Outcome: Ongoing
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
## 2021-09-05 NOTE — PLAN OF CARE
Problem: OXYGENATION/RESPIRATORY FUNCTION  Goal: Patient will maintain patent airway  9/5/2021 0220 by Susan Carrera RN  Outcome: Ongoing     Problem: OXYGENATION/RESPIRATORY FUNCTION  Goal: Patient will achieve/maintain normal respiratory rate/effort  Description: Respiratory rate and effort will be within normal limits for the patient  9/5/2021 0220 by Susan Carrera RN  Outcome: Ongoing     Problem: MECHANICAL VENTILATION  Goal: Patient will maintain patent airway  9/5/2021 0220 by Susan Carrera RN  Outcome: Ongoing     Problem: MECHANICAL VENTILATION  Goal: Oral health is maintained or improved  9/5/2021 0220 by Susan Carrera RN  Outcome: Ongoing     Problem: MECHANICAL VENTILATION  Goal: Ability to express needs and understand communication  9/5/2021 0220 by Susan Carrera RN  Outcome: Ongoing     Problem: MECHANICAL VENTILATION  Goal: Mobility/activity is maintained at optimum level for patient  9/5/2021 0220 by Susan Carrera RN  Outcome: Ongoing     Problem: MECHANICAL VENTILATION  Goal: Tracheostomy will be managed safely  9/5/2021 0220 by Susan Carrera RN  Outcome: Ongoing     Problem: SKIN INTEGRITY  Goal: Skin integrity is maintained or improved  9/5/2021 0220 by Susan Carrera RN  Outcome: Ongoing     Problem: Confusion - Acute:  Goal: Absence of continued neurological deterioration signs and symptoms  Description: Absence of continued neurological deterioration signs and symptoms  9/5/2021 0220 by Susan Carrera RN  Outcome: Ongoing     Problem: Confusion - Acute:  Goal: Mental status will be restored to baseline  Description: Mental status will be restored to baseline  Outcome: Ongoing     Problem: Discharge Planning:  Goal: Ability to perform activities of daily living will improve  Description: Ability to perform activities of daily living will improve  Outcome: Ongoing     Problem: Discharge Planning:  Goal: Participates in care planning  Description: Participates in care planning  Outcome: Ongoing     Problem: Injury - Risk of, Physical Injury:  Goal: Absence of physical injury  Description: Absence of physical injury  9/5/2021 0220 by Kristofer Whitaker RN  Outcome: Ongoing     Problem: Injury - Risk of, Physical Injury:  Goal: Will remain free from falls  Description: Will remain free from falls  Outcome: Ongoing     Problem: Mood - Altered:  Goal: Mood stable  Description: Mood stable  9/5/2021 0220 by Kristofer Whitaker RN  Outcome: Ongoing     Problem: Mood - Altered:  Goal: Absence of abusive behavior  Description: Absence of abusive behavior  Outcome: Ongoing     Problem: Mood - Altered:  Goal: Verbalizations of feeling emotionally comfortable while being cared for will increase  Description: Verbalizations of feeling emotionally comfortable while being cared for will increase  Outcome: Ongoing     Problem: Psychomotor Activity - Altered:  Goal: Absence of psychomotor disturbance signs and symptoms  Description: Absence of psychomotor disturbance signs and symptoms  Outcome: Ongoing     Problem: Sensory Perception - Impaired:  Goal: Demonstrations of improved sensory functioning will increase  Description: Demonstrations of improved sensory functioning will increase  Outcome: Ongoing  Goal: Decrease in sensory misperception frequency  Description: Decrease in sensory misperception frequency  Outcome: Ongoing  Goal: Able to refrain from responding to false sensory perceptions  Description: Able to refrain from responding to false sensory perceptions  Outcome: Ongoing  Goal: Demonstrates accurate environmental perceptions  Description: Demonstrates accurate environmental perceptions  Outcome: Ongoing  Goal: Able to distinguish between reality-based and nonreality-based thinking  Description: Able to distinguish between reality-based and nonreality-based thinking  Outcome: Ongoing  Goal: Able to interrupt nonreality-based thinking  Description: Able to interrupt nonreality-based thinking  Outcome: Ongoing     Problem: Sleep Pattern Disturbance:  Goal: Appears well-rested  Description: Appears well-rested  Outcome: Ongoing     Problem: Skin Integrity:  Goal: Will show no infection signs and symptoms  Description: Will show no infection signs and symptoms  9/5/2021 0220 by Vannessa Xiong RN  Outcome: Ongoing     Problem: Skin Integrity:  Goal: Absence of new skin breakdown  Description: Absence of new skin breakdown  Outcome: Ongoing     Problem: Falls - Risk of:  Goal: Absence of physical injury  Description: Absence of physical injury  9/5/2021 0220 by Vannessa Xiong RN  Outcome: Ongoing     Problem: Falls - Risk of:  Goal: Will remain free from falls  Description: Will remain free from falls  Outcome: Ongoing     Problem: Non-Violent Restraints  Goal: Removal from restraints as soon as assessed to be safe  9/5/2021 0220 by Vannessa Xiong RN  Outcome: Ongoing     Problem: Non-Violent Restraints  Goal: No harm/injury to patient while restraints in use  9/5/2021 0220 by Vannessa Xiong RN  Outcome: Ongoing     Problem: Non-Violent Restraints  Goal: Patient's dignity will be maintained  9/5/2021 0220 by Vannessa Xiong RN  Outcome: Ongoing     Problem: Nutrition  Goal: Optimal nutrition therapy  9/5/2021 0220 by Vannessa Xiong RN  Outcome: Ongoing

## 2021-09-05 NOTE — PROGRESS NOTES
Neurosurgery Service  Resident Daily Progress Note   9/5/2021 10:01 AM     Subjective    Patient seen and examined at the bed side, no acute events overnight. Intubated   EVD this morning was at 10 mm Hg  Drained 305 over last 24 hours  ICP: 7-8 Cm H2O    Objective    Vitals:    09/05/21 0758 09/05/21 0800 09/05/21 0819 09/05/21 0900   BP:  107/63  118/66   Pulse: 64 68 70 65   Resp:  18 11 18   Temp:  98.7 °F (37.1 °C)     TempSrc:  Oral     SpO2:  100% 100% 100%   Weight:       Height:           Physical Exam:  Physical Exam  Constitutional:       Interventions: She is intubated. HENT:      Head: Normocephalic and atraumatic. Mouth/Throat:      Mouth: Mucous membranes are moist.   Cardiovascular:      Rate and Rhythm: Normal rate and regular rhythm. Pulmonary:      Effort: She is intubated. Breath sounds: Normal breath sounds. Abdominal:      General: Abdomen is flat. Palpations: Abdomen is soft. Skin:     General: Skin is warm and dry. Neurological:      GCS: GCS eye subscore is 1. GCS verbal subscore is 1. GCS motor subscore is 5. Cranial Nerves: Cranial nerve deficit present. No dysarthria. Motor: Weakness present. No atrophy or seizure activity. Deep Tendon Reflexes: Babinski sign absent on the right side. Babinski sign absent on the left side. Reflex Scores:       Tricep reflexes are 2+ on the right side and 2+ on the left side. Bicep reflexes are 2+ on the right side and 2+ on the left side. Brachioradialis reflexes are 2+ on the right side and 2+ on the left side. Patellar reflexes are 2+ on the right side and 2+ on the left side. Achilles reflexes are 2+ on the right side and 2+ on the left side.      Comments:   Right pupil: 3mm, Left pupil 5 mm reactive to light   Moves RUE/RLE spontaneously                Labs:  Lab Results   Component Value Date    WBC 20.8 (H) 09/05/2021    HGB 10.6 (L) 09/05/2021    HCT 33.1 (L) 09/05/2021    PLT 627 (H) 09/05/2021    TRIG 74 08/28/2021     09/04/2021    K 4.4 09/04/2021     09/04/2021    CREATININE 0.32 (L) 09/04/2021    BUN 17 09/04/2021    CO2 25 09/04/2021    INR 1.1 08/24/2021       Radiology (last 24 hours): No new studies    ASSESSMENT & PLAN:    Kamila Andrews is a 38 y.o. female who presents with right-sided SDH      Severe TBI  POD 10 s/p craniotomy  EVD day 9  Increased EVD pressure to 15 mm Hg  Continue to keep EVD open   Monitor ICP and output hourly  Clamp trial early next week    Please contact neurosurgery with any changes in patient's neurologic status.       Eric Montanez MD  PGY-4 Neurology Resident Physician  Neurosurgery/Neuro Critical Care Team  Pager [phone]

## 2021-09-06 ENCOUNTER — APPOINTMENT (OUTPATIENT)
Dept: GENERAL RADIOLOGY | Age: 36
DRG: 003 | End: 2021-09-06
Payer: COMMERCIAL

## 2021-09-06 LAB
ANION GAP SERPL CALCULATED.3IONS-SCNC: 9 MMOL/L (ref 9–17)
BUN BLDV-MCNC: 29 MG/DL (ref 6–20)
BUN/CREAT BLD: ABNORMAL (ref 9–20)
CALCIUM SERPL-MCNC: 9.6 MG/DL (ref 8.6–10.4)
CHLORIDE BLD-SCNC: 99 MMOL/L (ref 98–107)
CO2: 26 MMOL/L (ref 20–31)
CREAT SERPL-MCNC: 0.37 MG/DL (ref 0.5–0.9)
CULTURE: ABNORMAL
DIRECT EXAM: ABNORMAL
GFR AFRICAN AMERICAN: >60 ML/MIN
GFR NON-AFRICAN AMERICAN: >60 ML/MIN
GFR SERPL CREATININE-BSD FRML MDRD: ABNORMAL ML/MIN/{1.73_M2}
GFR SERPL CREATININE-BSD FRML MDRD: ABNORMAL ML/MIN/{1.73_M2}
GLUCOSE BLD-MCNC: 113 MG/DL (ref 70–99)
HCT VFR BLD CALC: 33.6 % (ref 36.3–47.1)
HEMOGLOBIN: 10.7 G/DL (ref 11.9–15.1)
Lab: ABNORMAL
MCH RBC QN AUTO: 28 PG (ref 25.2–33.5)
MCHC RBC AUTO-ENTMCNC: 31.8 G/DL (ref 28.4–34.8)
MCV RBC AUTO: 88 FL (ref 82.6–102.9)
MRSA, DNA, NASAL: NORMAL
NRBC AUTOMATED: 0 PER 100 WBC
PDW BLD-RTO: 12.7 % (ref 11.8–14.4)
PLATELET # BLD: 712 K/UL (ref 138–453)
PMV BLD AUTO: 9.1 FL (ref 8.1–13.5)
POTASSIUM SERPL-SCNC: 4.6 MMOL/L (ref 3.7–5.3)
RBC # BLD: 3.82 M/UL (ref 3.95–5.11)
SODIUM BLD-SCNC: 134 MMOL/L (ref 135–144)
SPECIMEN DESCRIPTION: ABNORMAL
SPECIMEN DESCRIPTION: NORMAL
WBC # BLD: 20.5 K/UL (ref 3.5–11.3)

## 2021-09-06 PROCEDURE — 2700000000 HC OXYGEN THERAPY PER DAY

## 2021-09-06 PROCEDURE — 6370000000 HC RX 637 (ALT 250 FOR IP): Performed by: STUDENT IN AN ORGANIZED HEALTH CARE EDUCATION/TRAINING PROGRAM

## 2021-09-06 PROCEDURE — 94761 N-INVAS EAR/PLS OXIMETRY MLT: CPT

## 2021-09-06 PROCEDURE — 6370000000 HC RX 637 (ALT 250 FOR IP): Performed by: NURSE PRACTITIONER

## 2021-09-06 PROCEDURE — 2000000000 HC ICU R&B

## 2021-09-06 PROCEDURE — 80048 BASIC METABOLIC PNL TOTAL CA: CPT

## 2021-09-06 PROCEDURE — 85027 COMPLETE CBC AUTOMATED: CPT

## 2021-09-06 PROCEDURE — 6360000002 HC RX W HCPCS: Performed by: STUDENT IN AN ORGANIZED HEALTH CARE EDUCATION/TRAINING PROGRAM

## 2021-09-06 PROCEDURE — 99232 SBSQ HOSP IP/OBS MODERATE 35: CPT | Performed by: NEUROLOGICAL SURGERY

## 2021-09-06 PROCEDURE — 94003 VENT MGMT INPAT SUBQ DAY: CPT

## 2021-09-06 PROCEDURE — 36415 COLL VENOUS BLD VENIPUNCTURE: CPT

## 2021-09-06 PROCEDURE — 74018 RADEX ABDOMEN 1 VIEW: CPT

## 2021-09-06 PROCEDURE — 2580000003 HC RX 258: Performed by: STUDENT IN AN ORGANIZED HEALTH CARE EDUCATION/TRAINING PROGRAM

## 2021-09-06 RX ORDER — PROPRANOLOL HYDROCHLORIDE 10 MG/1
20 TABLET ORAL 3 TIMES DAILY
Status: DISCONTINUED | OUTPATIENT
Start: 2021-09-06 | End: 2021-09-08

## 2021-09-06 RX ADMIN — SODIUM CHLORIDE, PRESERVATIVE FREE 10 ML: 5 INJECTION INTRAVENOUS at 08:43

## 2021-09-06 RX ADMIN — AMANTADINE HYDROCHLORIDE 100 MG: 50 SOLUTION ORAL at 14:09

## 2021-09-06 RX ADMIN — PROPRANOLOL HYDROCHLORIDE 10 MG: 10 TABLET ORAL at 08:42

## 2021-09-06 RX ADMIN — SODIUM CHLORIDE, PRESERVATIVE FREE 10 ML: 5 INJECTION INTRAVENOUS at 21:05

## 2021-09-06 RX ADMIN — ENOXAPARIN SODIUM 30 MG: 30 INJECTION SUBCUTANEOUS at 08:42

## 2021-09-06 RX ADMIN — MAGNESIUM GLUCONATE 500 MG ORAL TABLET 400 MG: 500 TABLET ORAL at 14:08

## 2021-09-06 RX ADMIN — ACETAMINOPHEN 1000 MG: 500 TABLET ORAL at 21:05

## 2021-09-06 RX ADMIN — AMANTADINE HYDROCHLORIDE 100 MG: 50 SOLUTION ORAL at 08:43

## 2021-09-06 RX ADMIN — FAMOTIDINE 20 MG: 20 TABLET, FILM COATED ORAL at 21:05

## 2021-09-06 RX ADMIN — ACETAMINOPHEN 1000 MG: 500 TABLET ORAL at 14:12

## 2021-09-06 RX ADMIN — PROPRANOLOL HYDROCHLORIDE 20 MG: 10 TABLET ORAL at 14:08

## 2021-09-06 RX ADMIN — ENOXAPARIN SODIUM 30 MG: 30 INJECTION SUBCUTANEOUS at 21:05

## 2021-09-06 RX ADMIN — MAGNESIUM GLUCONATE 500 MG ORAL TABLET 400 MG: 500 TABLET ORAL at 21:05

## 2021-09-06 RX ADMIN — ACETAMINOPHEN 1000 MG: 500 TABLET ORAL at 06:33

## 2021-09-06 RX ADMIN — FAMOTIDINE 20 MG: 20 TABLET, FILM COATED ORAL at 08:42

## 2021-09-06 RX ADMIN — MAGNESIUM GLUCONATE 500 MG ORAL TABLET 400 MG: 500 TABLET ORAL at 08:42

## 2021-09-06 ASSESSMENT — PULMONARY FUNCTION TESTS
PIF_VALUE: 11
PIF_VALUE: 11
PIF_VALUE: 10
PIF_VALUE: 11
PIF_VALUE: 10
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
## 2021-09-06 NOTE — PLAN OF CARE
Problem: OXYGENATION/RESPIRATORY FUNCTION  Goal: Patient will maintain patent airway  Outcome: Ongoing  Goal: Patient will achieve/maintain normal respiratory rate/effort  Description: Respiratory rate and effort will be within normal limits for the patient  Outcome: Ongoing     Problem: MECHANICAL VENTILATION  Goal: Patient will maintain patent airway  Outcome: Ongoing  Goal: Oral health is maintained or improved  Outcome: Ongoing  Goal: Ability to express needs and understand communication  Outcome: Ongoing  Goal: Mobility/activity is maintained at optimum level for patient  Outcome: Ongoing  Goal: Tracheostomy will be managed safely  Outcome: Ongoing     Problem: SKIN INTEGRITY  Goal: Skin integrity is maintained or improved  9/6/2021 0225 by Ara Primrose, RN  Outcome: Ongoing  9/5/2021 1749 by Etta Everett RN  Outcome: Ongoing     Problem: Confusion - Acute:  Goal: Absence of continued neurological deterioration signs and symptoms  Description: Absence of continued neurological deterioration signs and symptoms  9/6/2021 0225 by Ara Primrose, RN  Outcome: Ongoing  9/5/2021 1749 by Etta Everett RN  Outcome: Ongoing  Goal: Mental status will be restored to baseline  Description: Mental status will be restored to baseline  Outcome: Ongoing     Problem: Discharge Planning:  Goal: Ability to perform activities of daily living will improve  Description: Ability to perform activities of daily living will improve  9/6/2021 0225 by Ara Primrose, RN  Outcome: Ongoing  9/5/2021 1749 by Etta Everett RN  Outcome: Ongoing  Goal: Participates in care planning  Description: Participates in care planning  Outcome: Ongoing     Problem: Injury - Risk of, Physical Injury:  Goal: Absence of physical injury  Description: Absence of physical injury  9/6/2021 0225 by Ara Primrose, RN  Outcome: Ongoing  9/5/2021 1749 by Etta Everett RN  Outcome: Ongoing  Goal: Will remain free from falls  Description: Will remain free from falls  Outcome: Ongoing     Problem: Mood - Altered:  Goal: Mood stable  Description: Mood stable  Outcome: Ongoing  Goal: Absence of abusive behavior  Description: Absence of abusive behavior  Outcome: Ongoing  Goal: Verbalizations of feeling emotionally comfortable while being cared for will increase  Description: Verbalizations of feeling emotionally comfortable while being cared for will increase  Outcome: Ongoing     Problem: Psychomotor Activity - Altered:  Goal: Absence of psychomotor disturbance signs and symptoms  Description: Absence of psychomotor disturbance signs and symptoms  Outcome: Ongoing     Problem: Sensory Perception - Impaired:  Goal: Demonstrations of improved sensory functioning will increase  Description: Demonstrations of improved sensory functioning will increase  Outcome: Ongoing  Goal: Decrease in sensory misperception frequency  Description: Decrease in sensory misperception frequency  Outcome: Ongoing  Goal: Able to refrain from responding to false sensory perceptions  Description: Able to refrain from responding to false sensory perceptions  Outcome: Ongoing  Goal: Demonstrates accurate environmental perceptions  Description: Demonstrates accurate environmental perceptions  Outcome: Ongoing  Goal: Able to distinguish between reality-based and nonreality-based thinking  Description: Able to distinguish between reality-based and nonreality-based thinking  Outcome: Ongoing  Goal: Able to interrupt nonreality-based thinking  Description: Able to interrupt nonreality-based thinking  Outcome: Ongoing     Problem: Sleep Pattern Disturbance:  Goal: Appears well-rested  Description: Appears well-rested  Outcome: Ongoing     Problem: Skin Integrity:  Goal: Will show no infection signs and symptoms  Description: Will show no infection signs and symptoms  9/6/2021 0225 by Mariano Dumont RN  Outcome: Ongoing  9/5/2021 1749 by José Miguel Edmonds RN  Outcome: Ongoing  Goal: Absence of new skin breakdown  Description: Absence of new skin breakdown  Outcome: Ongoing     Problem: Falls - Risk of:  Goal: Absence of physical injury  Description: Absence of physical injury  9/6/2021 0225 by Charles Quintana RN  Outcome: Ongoing  9/5/2021 1749 by Dalia Cash RN  Outcome: Ongoing  Goal: Will remain free from falls  Description: Will remain free from falls  Outcome: Ongoing     Problem: Non-Violent Restraints  Goal: Removal from restraints as soon as assessed to be safe  9/6/2021 0225 by Charles Quintana RN  Outcome: Ongoing  9/5/2021 1749 by Dalia Cash RN  Outcome: Ongoing  Goal: No harm/injury to patient while restraints in use  9/6/2021 0225 by Charles Quintana RN  Outcome: Ongoing  9/5/2021 1749 by Dalia Cash RN  Outcome: Met This Shift  Goal: Patient's dignity will be maintained  9/6/2021 0225 by Charles Quintana RN  Outcome: Ongoing  9/5/2021 1749 by Dalia Cash RN  Outcome: Met This Shift     Problem: Nutrition  Goal: Optimal nutrition therapy  9/6/2021 0225 by Charles Quintana RN  Outcome: Ongoing  9/5/2021 1749 by Dalia Cash RN  Outcome: Ongoing

## 2021-09-06 NOTE — PROGRESS NOTES
Neurosurgery Service  Resident Daily Progress Note   9/6/2021 8:04 AM     Subjective    Patient seen and examined at the bed side, no acute events overnight. Intubated, following simple commands. No movement in the LUE. EVD this morning was at 15 mm cmH20. Drained 104 over last 12 hours  ICP: 10-15 Cm H2O    Objective    Vitals:    09/06/21 0400 09/06/21 0500 09/06/21 0600 09/06/21 0700   BP: 117/64 (!) 108/57 113/63 116/63   Pulse: 88 80 78 86   Resp: 14 23 19 17   Temp: 98.2 °F (36.8 °C)      TempSrc: Axillary      SpO2: 99% 100% 100% 99%   Weight:       Height:           Physical Exam:  Physical Exam  Constitutional:       Interventions: She is intubated. HENT:      Head: Normocephalic and atraumatic. Mouth/Throat:      Mouth: Mucous membranes are moist.   Cardiovascular:      Rate and Rhythm: Normal rate and regular rhythm. Pulmonary:      Effort: She is intubated. Breath sounds: Normal breath sounds. Abdominal:      General: Abdomen is flat. Palpations: Abdomen is soft. Skin:     General: Skin is warm and dry. Neurological:      GCS: GCS eye subscore is 1. GCS verbal subscore is 1. GCS motor subscore is 5. Cranial Nerves: Cranial nerve deficit present. No dysarthria. Motor: Weakness present. No atrophy or seizure activity. Deep Tendon Reflexes: Babinski sign absent on the right side. Babinski sign absent on the left side. Reflex Scores:       Tricep reflexes are 2+ on the right side and 2+ on the left side. Bicep reflexes are 2+ on the right side and 2+ on the left side. Brachioradialis reflexes are 2+ on the right side and 2+ on the left side. Patellar reflexes are 2+ on the right side and 2+ on the left side. Achilles reflexes are 2+ on the right side and 2+ on the left side.      Comments:   Right pupil: 3mm, Left pupil 5 mm reactive to light   Moves RUE/RLE spontaneously                Labs:  Lab Results   Component Value Date    WBC
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
## 2021-09-06 NOTE — PLAN OF CARE
Problem: Non-Violent Restraints  Goal: No harm/injury to patient while restraints in use  9/6/2021 1157 by Darleen Shore RN  Outcome: Met This Shift  9/6/2021 0225 by Zhou Carrillo RN  Outcome: Ongoing  Goal: Patient's dignity will be maintained  9/6/2021 1157 by Darleen Shore RN  Outcome: Met This Shift  9/6/2021 0225 by Zhou Carrillo RN  Outcome: Ongoing     Problem: OXYGENATION/RESPIRATORY FUNCTION  Goal: Patient will maintain patent airway  9/6/2021 1157 by Darleen Shore RN  Outcome: Ongoing  9/6/2021 0823 by Yoni Garcia RCP  Outcome: Ongoing  9/6/2021 0225 by Zhou Carrillo RN  Outcome: Ongoing  Goal: Patient will achieve/maintain normal respiratory rate/effort  Description: Respiratory rate and effort will be within normal limits for the patient  9/6/2021 1157 by Darleen Shore RN  Outcome: Ongoing  9/6/2021 0823 by Yoni Garcia RCP  Outcome: Ongoing  9/6/2021 0225 by Zhou Carrillo RN  Outcome: Ongoing     Problem: MECHANICAL VENTILATION  Goal: Patient will maintain patent airway  9/6/2021 1157 by Darleen Shore RN  Outcome: Ongoing  9/6/2021 0823 by Yoni Garcia RCP  Outcome: Ongoing  9/6/2021 0225 by Zhou Carrillo RN  Outcome: Ongoing  Goal: Oral health is maintained or improved  9/6/2021 1157 by Darleen Shore RN  Outcome: Ongoing  9/6/2021 0823 by Yoni Garcia RCP  Outcome: Ongoing  9/6/2021 0225 by Zhou Carrillo RN  Outcome: Ongoing  Goal: Ability to express needs and understand communication  9/6/2021 1157 by Darleen Shore RN  Outcome: Ongoing  9/6/2021 0823 by Yoni Garcia RCP  Outcome: Ongoing  9/6/2021 0225 by Zhou Carrillo RN  Outcome: Ongoing  Goal: Mobility/activity is maintained at optimum level for patient  9/6/2021 1157 by Darleen Shore RN  Outcome: Ongoing  9/6/2021 0823 by Yoni Garcia RCP  Outcome: Ongoing  9/6/2021 0225 by Zhou Carrillo RN  Outcome: Ongoing  Goal: Tracheostomy will be managed safely  9/6/2021 1157 by Alysha Elizondo RN  Outcome: Ongoing  9/6/2021 0823 by Josias Suh RCP  Outcome: Ongoing  9/6/2021 0225 by Jorge Guzmán RN  Outcome: Ongoing     Problem: SKIN INTEGRITY  Goal: Skin integrity is maintained or improved  9/6/2021 1157 by Alysha Elizondo RN  Outcome: Ongoing  9/6/2021 0225 by Jorge Guzmán RN  Outcome: Ongoing     Problem: Confusion - Acute:  Goal: Absence of continued neurological deterioration signs and symptoms  Description: Absence of continued neurological deterioration signs and symptoms  9/6/2021 1157 by Alysha Elizondo RN  Outcome: Ongoing  9/6/2021 0225 by Jorge Guzmán RN  Outcome: Ongoing  Goal: Mental status will be restored to baseline  Description: Mental status will be restored to baseline  9/6/2021 1157 by Alysha Elizondo RN  Outcome: Ongoing  9/6/2021 0225 by Jorge Guzmán RN  Outcome: Ongoing     Problem: Discharge Planning:  Goal: Ability to perform activities of daily living will improve  Description: Ability to perform activities of daily living will improve  9/6/2021 1157 by Alysha Elizondo RN  Outcome: Ongoing  9/6/2021 0225 by Jorge Guzmán RN  Outcome: Ongoing  Goal: Participates in care planning  Description: Participates in care planning  9/6/2021 1157 by Alysha Elizondo RN  Outcome: Ongoing  9/6/2021 0225 by Jorge Guzmán RN  Outcome: Ongoing     Problem: Injury - Risk of, Physical Injury:  Goal: Absence of physical injury  Description: Absence of physical injury  9/6/2021 1157 by Alysha Elizondo RN  Outcome: Ongoing  9/6/2021 0225 by Jorge Guzmán RN  Outcome: Ongoing  Goal: Will remain free from falls  Description: Will remain free from falls  9/6/2021 1157 by Alysha Elizondo RN  Outcome: Ongoing  9/6/2021 0225 by Jorge Guzmán RN  Outcome: Ongoing     Problem: Mood - Altered:  Goal: Mood stable  Description: Mood stable  9/6/2021 1157 by Alysha Elizondo RN  Outcome: Ongoing  9/6/2021 0225 by Ab Byrd SABINA Praekh  Outcome: Ongoing  Goal: Absence of abusive behavior  Description: Absence of abusive behavior  9/6/2021 1157 by Fabienne Alarcon RN  Outcome: Ongoing  9/6/2021 0225 by Damon Levine RN  Outcome: Ongoing  Goal: Verbalizations of feeling emotionally comfortable while being cared for will increase  Description: Verbalizations of feeling emotionally comfortable while being cared for will increase  9/6/2021 1157 by Fabienne Alarcon RN  Outcome: Ongoing  9/6/2021 0225 by Damon Levine RN  Outcome: Ongoing     Problem: Psychomotor Activity - Altered:  Goal: Absence of psychomotor disturbance signs and symptoms  Description: Absence of psychomotor disturbance signs and symptoms  9/6/2021 1157 by Fabienne Alarcon RN  Outcome: Ongoing  9/6/2021 0225 by Damon Levine RN  Outcome: Ongoing     Problem: Sensory Perception - Impaired:  Goal: Demonstrations of improved sensory functioning will increase  Description: Demonstrations of improved sensory functioning will increase  9/6/2021 1157 by Fabienne Alarcon RN  Outcome: Ongoing  9/6/2021 0225 by Damon Levine RN  Outcome: Ongoing  Goal: Decrease in sensory misperception frequency  Description: Decrease in sensory misperception frequency  9/6/2021 1157 by Fabienne Alarcon RN  Outcome: Ongoing  9/6/2021 0225 by Damon Levine RN  Outcome: Ongoing  Goal: Able to refrain from responding to false sensory perceptions  Description: Able to refrain from responding to false sensory perceptions  9/6/2021 1157 by Fabienne Alarcon RN  Outcome: Ongoing  9/6/2021 0225 by Damon Levine RN  Outcome: Ongoing  Goal: Demonstrates accurate environmental perceptions  Description: Demonstrates accurate environmental perceptions  9/6/2021 1157 by Fabienne Alarcon RN  Outcome: Ongoing  9/6/2021 0225 by Damon Levine RN  Outcome: Ongoing  Goal: Able to distinguish between reality-based and nonreality-based thinking  Description: Able to distinguish between reality-based and nonreality-based thinking  9/6/2021 1157 by Alejandra Angeles RN  Outcome: Ongoing  9/6/2021 0225 by Errol Boss RN  Outcome: Ongoing  Goal: Able to interrupt nonreality-based thinking  Description: Able to interrupt nonreality-based thinking  9/6/2021 1157 by Alejandra Angeles RN  Outcome: Ongoing  9/6/2021 0225 by Errol Boss RN  Outcome: Ongoing     Problem: Sleep Pattern Disturbance:  Goal: Appears well-rested  Description: Appears well-rested  9/6/2021 1157 by Alejandra Angeles RN  Outcome: Ongoing  9/6/2021 0225 by Errol Boss RN  Outcome: Ongoing     Problem: Skin Integrity:  Goal: Will show no infection signs and symptoms  Description: Will show no infection signs and symptoms  9/6/2021 1157 by Alejandra Angeles RN  Outcome: Ongoing  9/6/2021 0823 by Franklyn Rubio RCP  Outcome: Ongoing  9/6/2021 0225 by Errol Boss RN  Outcome: Ongoing  Goal: Absence of new skin breakdown  Description: Absence of new skin breakdown  9/6/2021 1157 by Alejandra Angeles RN  Outcome: Ongoing  9/6/2021 0823 by Franklyn Rubio RCP  Outcome: Ongoing  9/6/2021 0225 by Errol Boss RN  Outcome: Ongoing     Problem: Falls - Risk of:  Goal: Absence of physical injury  Description: Absence of physical injury  9/6/2021 1157 by Alejandra Angeles RN  Outcome: Ongoing  9/6/2021 0225 by Errol Boss RN  Outcome: Ongoing  Goal: Will remain free from falls  Description: Will remain free from falls  9/6/2021 1157 by Alejandra Angeles RN  Outcome: Ongoing  9/6/2021 0225 by Errol Boss RN  Outcome: Ongoing     Problem: Non-Violent Restraints  Goal: Removal from restraints as soon as assessed to be safe  9/6/2021 1157 by Alejandra Angeles RN  Outcome: Ongoing  9/6/2021 0225 by Errol Boss RN  Outcome: Ongoing     Problem: Nutrition  Goal: Optimal nutrition therapy  9/6/2021 1157 by Alejandra Angeles RN  Outcome: Ongoing  9/6/2021 0225 by Norton [place], RN  Outcome: Ongoing
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
## 2021-09-07 ENCOUNTER — APPOINTMENT (OUTPATIENT)
Dept: CT IMAGING | Age: 36
DRG: 003 | End: 2021-09-07
Payer: COMMERCIAL

## 2021-09-07 LAB
ABSOLUTE EOS #: 0.3 K/UL (ref 0–0.44)
ABSOLUTE IMMATURE GRANULOCYTE: 0.07 K/UL (ref 0–0.3)
ABSOLUTE LYMPH #: 2.17 K/UL (ref 1.1–3.7)
ABSOLUTE MONO #: 0.87 K/UL (ref 0.1–1.2)
BANDS, CSF: NORMAL %
BASO CSF: NORMAL %
BASOPHILS # BLD: 1 % (ref 0–2)
BASOPHILS ABSOLUTE: 0.1 K/UL (ref 0–0.2)
BLAST CSF: NORMAL %
DIFFERENTIAL TYPE: ABNORMAL
EOS CSF: NORMAL %
EOSINOPHILS RELATIVE PERCENT: 2 % (ref 1–4)
FLUID DIFF COMMENT: NORMAL
HCT VFR BLD CALC: 35.2 % (ref 36.3–47.1)
HEMOGLOBIN: 11 G/DL (ref 11.9–15.1)
IMMATURE GRANULOCYTES: 1 %
LYMPHOCYTES # BLD: 16 % (ref 24–43)
LYMPHS CSF: 2 %
MCH RBC QN AUTO: 27.4 PG (ref 25.2–33.5)
MCHC RBC AUTO-ENTMCNC: 31.3 G/DL (ref 28.4–34.8)
MCV RBC AUTO: 87.6 FL (ref 82.6–102.9)
METAYELO CSF: NORMAL %
MONO/MACROPHAGE CSF (MANUAL): NORMAL %
MONOCYTES # BLD: 6 % (ref 3–12)
MYELOCYTE CSF: NORMAL %
NEUTROPHILS, CSF: 88 %
NRBC AUTOMATED: 0 PER 100 WBC
OTHER CELLS FLUID: NORMAL %
PDW BLD-RTO: 12.5 % (ref 11.8–14.4)
PLATELET # BLD: 764 K/UL (ref 138–453)
PLATELET ESTIMATE: ABNORMAL
PMV BLD AUTO: 8.7 FL (ref 8.1–13.5)
RBC # BLD: 4.02 M/UL (ref 3.95–5.11)
RBC # BLD: ABNORMAL 10*6/UL
SEG NEUTROPHILS: 74 % (ref 36–65)
SEGMENTED NEUTROPHILS ABSOLUTE COUNT: 10.19 K/UL (ref 1.5–8.1)
WBC # BLD: 13.7 K/UL (ref 3.5–11.3)
WBC # BLD: ABNORMAL 10*3/UL

## 2021-09-07 PROCEDURE — 2000000000 HC ICU R&B

## 2021-09-07 PROCEDURE — 6370000000 HC RX 637 (ALT 250 FOR IP): Performed by: STUDENT IN AN ORGANIZED HEALTH CARE EDUCATION/TRAINING PROGRAM

## 2021-09-07 PROCEDURE — 97110 THERAPEUTIC EXERCISES: CPT

## 2021-09-07 PROCEDURE — 85025 COMPLETE CBC W/AUTO DIFF WBC: CPT

## 2021-09-07 PROCEDURE — 70450 CT HEAD/BRAIN W/O DYE: CPT

## 2021-09-07 PROCEDURE — 2700000000 HC OXYGEN THERAPY PER DAY

## 2021-09-07 PROCEDURE — 94003 VENT MGMT INPAT SUBQ DAY: CPT

## 2021-09-07 PROCEDURE — APPSS45 APP SPLIT SHARED TIME 31-45 MINUTES: Performed by: REGISTERED NURSE

## 2021-09-07 PROCEDURE — 6360000002 HC RX W HCPCS: Performed by: STUDENT IN AN ORGANIZED HEALTH CARE EDUCATION/TRAINING PROGRAM

## 2021-09-07 PROCEDURE — 36415 COLL VENOUS BLD VENIPUNCTURE: CPT

## 2021-09-07 PROCEDURE — 6370000000 HC RX 637 (ALT 250 FOR IP): Performed by: NURSE PRACTITIONER

## 2021-09-07 PROCEDURE — 2580000003 HC RX 258: Performed by: STUDENT IN AN ORGANIZED HEALTH CARE EDUCATION/TRAINING PROGRAM

## 2021-09-07 PROCEDURE — 94761 N-INVAS EAR/PLS OXIMETRY MLT: CPT

## 2021-09-07 PROCEDURE — 99291 CRITICAL CARE FIRST HOUR: CPT | Performed by: NEUROLOGICAL SURGERY

## 2021-09-07 RX ADMIN — SODIUM CHLORIDE, PRESERVATIVE FREE 10 ML: 5 INJECTION INTRAVENOUS at 21:00

## 2021-09-07 RX ADMIN — AMANTADINE HYDROCHLORIDE 100 MG: 50 SOLUTION ORAL at 08:13

## 2021-09-07 RX ADMIN — MAGNESIUM GLUCONATE 500 MG ORAL TABLET 400 MG: 500 TABLET ORAL at 13:19

## 2021-09-07 RX ADMIN — SODIUM CHLORIDE, PRESERVATIVE FREE 10 ML: 5 INJECTION INTRAVENOUS at 08:13

## 2021-09-07 RX ADMIN — ACETAMINOPHEN 1000 MG: 500 TABLET ORAL at 22:53

## 2021-09-07 RX ADMIN — ACETAMINOPHEN 1000 MG: 500 TABLET ORAL at 05:48

## 2021-09-07 RX ADMIN — PROPRANOLOL HYDROCHLORIDE 20 MG: 10 TABLET ORAL at 08:12

## 2021-09-07 RX ADMIN — FAMOTIDINE 20 MG: 20 TABLET, FILM COATED ORAL at 21:01

## 2021-09-07 RX ADMIN — ENOXAPARIN SODIUM 30 MG: 30 INJECTION SUBCUTANEOUS at 21:00

## 2021-09-07 RX ADMIN — MAGNESIUM GLUCONATE 500 MG ORAL TABLET 400 MG: 500 TABLET ORAL at 21:00

## 2021-09-07 RX ADMIN — PROPRANOLOL HYDROCHLORIDE 20 MG: 10 TABLET ORAL at 13:19

## 2021-09-07 RX ADMIN — PROPRANOLOL HYDROCHLORIDE 20 MG: 10 TABLET ORAL at 21:00

## 2021-09-07 RX ADMIN — FAMOTIDINE 20 MG: 20 TABLET, FILM COATED ORAL at 08:12

## 2021-09-07 RX ADMIN — MAGNESIUM GLUCONATE 500 MG ORAL TABLET 400 MG: 500 TABLET ORAL at 08:12

## 2021-09-07 RX ADMIN — AMANTADINE HYDROCHLORIDE 100 MG: 50 SOLUTION ORAL at 13:19

## 2021-09-07 RX ADMIN — ACETAMINOPHEN 1000 MG: 500 TABLET ORAL at 13:18

## 2021-09-07 RX ADMIN — ENOXAPARIN SODIUM 30 MG: 30 INJECTION SUBCUTANEOUS at 08:12

## 2021-09-07 ASSESSMENT — PULMONARY FUNCTION TESTS
PIF_VALUE: 10
PIF_VALUE: 11
PIF_VALUE: 11
PIF_VALUE: 10
PIF_VALUE: 10
PIF_VALUE: 11

## 2021-09-07 NOTE — FLOWSHEET NOTE
Clamp trial began at 0900 per neurosurgery Aydin Oropeza NP. Pt's HOB was flat for an hour. ICPs ranged from 12-20. Pt was more alert than at 0800 assessment and followed commands. Pt remains clamped.

## 2021-09-07 NOTE — PLAN OF CARE
Nutrition Problem #1: Inadequate oral intake  Intervention: Food and/or Nutrient Delivery: Continue NPO, Continue Current Tube Feeding  Nutritional Goals: EN intake to meet >75% of estimated nutrition needs

## 2021-09-07 NOTE — PROGRESS NOTES
[facility]up[facility] [employer]  Occupational Therapy Not Seen Note    DATE: 2021    NAME: Bj Frazier  MRN: [id number]   : 1985      Patient not seen this date for Occupational Therapy due to:    Patient is not appropriate for active participation in OT evaluation/treatment at this time d/t having to remain flat today per MD.    Next Scheduled Treatment: Check back as able       Electronically signed by Daniel Polo OT on 2021 at 9:38 AM

## 2021-09-07 NOTE — PLAN OF CARE
Problem: OXYGENATION/RESPIRATORY FUNCTION  Goal: Patient will maintain patent airway  9/7/2021 0035 by Demi Trivedi RN  Outcome: Ongoing  9/6/2021 1157 by Angie Nixon RN  Outcome: Ongoing  Goal: Patient will achieve/maintain normal respiratory rate/effort  Description: Respiratory rate and effort will be within normal limits for the patient  9/7/2021 0035 by Demi Trivedi RN  Outcome: Ongoing  9/6/2021 1157 by Angie Nixon RN  Outcome: Ongoing     Problem: MECHANICAL VENTILATION  Goal: Patient will maintain patent airway  9/7/2021 0035 by Demi Trivedi RN  Outcome: Ongoing  9/6/2021 1157 by Angie Nixon RN  Outcome: Ongoing  Goal: Oral health is maintained or improved  9/7/2021 0035 by Demi Trivedi RN  Outcome: Ongoing  9/6/2021 1157 by Angie Nixon RN  Outcome: Ongoing  Goal: Ability to express needs and understand communication  9/7/2021 0035 by Demi Trivedi RN  Outcome: Ongoing  9/6/2021 1157 by Angie Nixon RN  Outcome: Ongoing  Goal: Mobility/activity is maintained at optimum level for patient  9/7/2021 0035 by Demi Trivedi RN  Outcome: Ongoing  9/6/2021 1157 by Angie Nixon RN  Outcome: Ongoing  Goal: Tracheostomy will be managed safely  9/7/2021 0035 by Deim Trivedi RN  Outcome: Ongoing  9/6/2021 1157 by Angie Nixon RN  Outcome: Ongoing     Problem: SKIN INTEGRITY  Goal: Skin integrity is maintained or improved  9/7/2021 0035 by Demi Trivedi RN  Outcome: Ongoing  9/6/2021 1157 by Angie Nixon RN  Outcome: Ongoing     Problem: Confusion - Acute:  Goal: Absence of continued neurological deterioration signs and symptoms  Description: Absence of continued neurological deterioration signs and symptoms  9/7/2021 0035 by Demi Trivedi RN  Outcome: Ongoing  9/6/2021 1157 by Angie Nixon RN  Outcome: Ongoing  Goal: Mental status will be restored to baseline  Description: Mental status will be restored to baseline  9/7/2021 0035 by Gladis Hamm RN  Outcome: Ongoing  9/6/2021 1157 by Milagros Franco RN  Outcome: Ongoing     Problem: Discharge Planning:  Goal: Ability to perform activities of daily living will improve  Description: Ability to perform activities of daily living will improve  9/7/2021 0035 by Gladis Hamm RN  Outcome: Ongoing  9/6/2021 1157 by Milagros Franco RN  Outcome: Ongoing  Goal: Participates in care planning  Description: Participates in care planning  9/7/2021 0035 by Gladis Hamm RN  Outcome: Ongoing  9/6/2021 1157 by Milagros Franco RN  Outcome: Ongoing     Problem: Injury - Risk of, Physical Injury:  Goal: Absence of physical injury  Description: Absence of physical injury  9/7/2021 0035 by Gladis Hamm RN  Outcome: Ongoing  9/6/2021 1157 by Milagros Franco RN  Outcome: Ongoing  Goal: Will remain free from falls  Description: Will remain free from falls  9/7/2021 0035 by Gladis Hamm RN  Outcome: Ongoing  9/6/2021 1157 by Milagros Franco RN  Outcome: Ongoing     Problem: Mood - Altered:  Goal: Mood stable  Description: Mood stable  9/7/2021 0035 by Gladis Hamm RN  Outcome: Ongoing  9/6/2021 1157 by Milagros Franco RN  Outcome: Ongoing  Goal: Absence of abusive behavior  Description: Absence of abusive behavior  9/7/2021 0035 by Gladis Hamm RN  Outcome: Ongoing  9/6/2021 1157 by Milagros Franco RN  Outcome: Ongoing  Goal: Verbalizations of feeling emotionally comfortable while being cared for will increase  Description: Verbalizations of feeling emotionally comfortable while being cared for will increase  9/7/2021 0035 by Gladis Hamm RN  Outcome: Ongoing  9/6/2021 1157 by Milagros Franco RN  Outcome: Ongoing     Problem: Psychomotor Activity - Altered:  Goal: Absence of psychomotor disturbance signs and symptoms  Description: Absence of psychomotor disturbance signs and symptoms  9/7/2021 0035 by Gladis Hamm RN  Outcome: Ongoing  9/6/2021 1157 by Milagros Franco RN  Outcome: Ongoing     Problem: Sensory Perception - Impaired:  Goal: Demonstrations of improved sensory functioning will increase  Description: Demonstrations of improved sensory functioning will increase  9/7/2021 0035 by Savannah Suárez RN  Outcome: Ongoing  9/6/2021 1157 by Pascual Schwarz RN  Outcome: Ongoing  Goal: Decrease in sensory misperception frequency  Description: Decrease in sensory misperception frequency  9/7/2021 0035 by Savannah Suárez RN  Outcome: Ongoing  9/6/2021 1157 by Pascual Schwarz RN  Outcome: Ongoing  Goal: Able to refrain from responding to false sensory perceptions  Description: Able to refrain from responding to false sensory perceptions  9/7/2021 0035 by Savannah Suárez RN  Outcome: Ongoing  9/6/2021 1157 by Pascual Schwarz RN  Outcome: Ongoing  Goal: Demonstrates accurate environmental perceptions  Description: Demonstrates accurate environmental perceptions  9/7/2021 0035 by Savannah Suárez RN  Outcome: Ongoing  9/6/2021 1157 by Pascual Schwarz RN  Outcome: Ongoing  Goal: Able to distinguish between reality-based and nonreality-based thinking  Description: Able to distinguish between reality-based and nonreality-based thinking  9/7/2021 0035 by Savannah Suárez RN  Outcome: Ongoing  9/6/2021 1157 by Pascual Schwarz RN  Outcome: Ongoing  Goal: Able to interrupt nonreality-based thinking  Description: Able to interrupt nonreality-based thinking  9/7/2021 0035 by Savannah Suárez RN  Outcome: Ongoing  9/6/2021 1157 by Pascual Schwarz RN  Outcome: Ongoing     Problem: Sleep Pattern Disturbance:  Goal: Appears well-rested  Description: Appears well-rested  9/7/2021 0035 by Savannah Suárez RN  Outcome: Ongoing  9/6/2021 1157 by Pascual Schwarz RN  Outcome: Ongoing     Problem: Skin Integrity:  Goal: Will show no infection signs and symptoms  Description: Will show no infection signs and symptoms  9/7/2021 0035 by Savannah Suárez RN  Outcome: Ongoing  9/6/2021 1157 by Katherin Cruz RN  Outcome: Ongoing  Goal: Absence of new skin breakdown  Description: Absence of new skin breakdown  9/7/2021 0035 by Judith Kerr RN  Outcome: Ongoing  9/6/2021 1157 by Katherin Cruz RN  Outcome: Ongoing     Problem: Falls - Risk of:  Goal: Absence of physical injury  Description: Absence of physical injury  9/7/2021 0035 by Judith Kerr RN  Outcome: Ongoing  9/6/2021 1157 by Katherin Cruz RN  Outcome: Ongoing  Goal: Will remain free from falls  Description: Will remain free from falls  9/7/2021 0035 by Judith Kerr RN  Outcome: Ongoing  9/6/2021 1157 by Katherin Cruz RN  Outcome: Ongoing     Problem: Non-Violent Restraints  Goal: Removal from restraints as soon as assessed to be safe  9/7/2021 0035 by Judith Kerr RN  Outcome: Ongoing  9/6/2021 1157 by Katherin Cruz RN  Outcome: Ongoing  Goal: No harm/injury to patient while restraints in use  9/7/2021 0035 by Judith Kerr RN  Outcome: Ongoing  9/6/2021 1157 by Katherin Cruz RN  Outcome: Met This Shift  Goal: Patient's dignity will be maintained  9/7/2021 0035 by Judith Kerr RN  Outcome: Ongoing  9/6/2021 1157 by Katherin Cruz RN  Outcome: Met This Shift     Problem: Nutrition  Goal: Optimal nutrition therapy  9/7/2021 0035 by Judith Kerr RN  Outcome: Ongoing  9/6/2021 1157 by Katherin rCuz RN  Outcome: Ongoing

## 2021-09-07 NOTE — PROGRESS NOTES
Trach care performed. Site cleansed, dried, dressing applied. Inner cannula changed. Patient tolerated well.
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
## 2021-09-07 NOTE — PROGRESS NOTES
09/07/21 0137   Surgical Airway (trach) Shiley Cuffed   Placement Date: 08/29/21   Placed By: In surgery  Surgical Airway Type: Tracheostomy  Brand: Sonia  Style: Cuffed  Size (mm): 8   Status Secured   Site Assessment Oozing Secretions;Drainage   Site Care Cleansed;Dried;Dressing applied   Inner Cannula Care Changed/new   Ties Assessment Dry; Intact; Secure     Trach care completed w/o complications

## 2021-09-07 NOTE — PROGRESS NOTES
Physical Therapy  DATE: 2021  NAME: Wayne Malagon  MRN: [id number]   : 1985    Discharge Recommendations: Continue to Assess (pending progress)     Subjective: Pt resting in bed upon arrival, appears comfortable  Pain: JORGE  Patient follows: some Commands, squeezes R hand to command  Is patient on ventilator: Yes  Is patient on sedation: Yes  Precautions: EVD, ICP to remain <22    Therapeutic exercises:  UE  Bilateral Passive range of motion all planes x 10 reps  R LE gastrocnemius stretching 2 reps x 30 seconds  R LE PROM in all planes x 10  L LE held due to ICP >22 after L UE PROM, RN notified  Goals  Short Term Goals  Short term goal 1: Prevent contractures through ROM and stretching  Short term goal 2: Pt to follow some commands for active exercise. Short term goal 3: Progress with mobility as appropriate          Plan: Progress functional mobility as medically appropriate.    Time In: 1150    Time Out: 1204  Time Coded Minutes (treatment minutes): 14  Rehab Potential: Fair  Treatments/week: 2-3x/wk    Thelma Walsh, PTA

## 2021-09-08 ENCOUNTER — APPOINTMENT (OUTPATIENT)
Dept: CT IMAGING | Age: 36
DRG: 003 | End: 2021-09-08
Payer: COMMERCIAL

## 2021-09-08 LAB
ANION GAP SERPL CALCULATED.3IONS-SCNC: 13 MMOL/L (ref 9–17)
BUN BLDV-MCNC: 33 MG/DL (ref 6–20)
BUN/CREAT BLD: ABNORMAL (ref 9–20)
CALCIUM SERPL-MCNC: 9.4 MG/DL (ref 8.6–10.4)
CHLORIDE BLD-SCNC: 100 MMOL/L (ref 98–107)
CO2: 26 MMOL/L (ref 20–31)
CREAT SERPL-MCNC: 0.4 MG/DL (ref 0.5–0.9)
GFR AFRICAN AMERICAN: >60 ML/MIN
GFR NON-AFRICAN AMERICAN: >60 ML/MIN
GFR SERPL CREATININE-BSD FRML MDRD: ABNORMAL ML/MIN/{1.73_M2}
GFR SERPL CREATININE-BSD FRML MDRD: ABNORMAL ML/MIN/{1.73_M2}
GLUCOSE BLD-MCNC: 112 MG/DL (ref 70–99)
POTASSIUM SERPL-SCNC: 4.4 MMOL/L (ref 3.7–5.3)
SODIUM BLD-SCNC: 139 MMOL/L (ref 135–144)

## 2021-09-08 PROCEDURE — 2700000000 HC OXYGEN THERAPY PER DAY

## 2021-09-08 PROCEDURE — 70450 CT HEAD/BRAIN W/O DYE: CPT

## 2021-09-08 PROCEDURE — 2580000003 HC RX 258: Performed by: REGISTERED NURSE

## 2021-09-08 PROCEDURE — 99291 CRITICAL CARE FIRST HOUR: CPT | Performed by: NEUROLOGICAL SURGERY

## 2021-09-08 PROCEDURE — 99233 SBSQ HOSP IP/OBS HIGH 50: CPT | Performed by: FAMILY MEDICINE

## 2021-09-08 PROCEDURE — 6370000000 HC RX 637 (ALT 250 FOR IP): Performed by: STUDENT IN AN ORGANIZED HEALTH CARE EDUCATION/TRAINING PROGRAM

## 2021-09-08 PROCEDURE — 80048 BASIC METABOLIC PNL TOTAL CA: CPT

## 2021-09-08 PROCEDURE — 6370000000 HC RX 637 (ALT 250 FOR IP): Performed by: NURSE PRACTITIONER

## 2021-09-08 PROCEDURE — APPSS45 APP SPLIT SHARED TIME 31-45 MINUTES: Performed by: REGISTERED NURSE

## 2021-09-08 PROCEDURE — 2580000003 HC RX 258: Performed by: STUDENT IN AN ORGANIZED HEALTH CARE EDUCATION/TRAINING PROGRAM

## 2021-09-08 PROCEDURE — 2000000000 HC ICU R&B

## 2021-09-08 PROCEDURE — 94003 VENT MGMT INPAT SUBQ DAY: CPT

## 2021-09-08 PROCEDURE — 94761 N-INVAS EAR/PLS OXIMETRY MLT: CPT

## 2021-09-08 PROCEDURE — 6360000002 HC RX W HCPCS: Performed by: REGISTERED NURSE

## 2021-09-08 PROCEDURE — 6360000002 HC RX W HCPCS: Performed by: STUDENT IN AN ORGANIZED HEALTH CARE EDUCATION/TRAINING PROGRAM

## 2021-09-08 PROCEDURE — 36415 COLL VENOUS BLD VENIPUNCTURE: CPT

## 2021-09-08 RX ORDER — POLYETHYLENE GLYCOL 3350 17 G/17G
17 POWDER, FOR SOLUTION ORAL DAILY
Status: DISCONTINUED | OUTPATIENT
Start: 2021-09-08 | End: 2021-09-20

## 2021-09-08 RX ORDER — PROPRANOLOL HYDROCHLORIDE 10 MG/1
10 TABLET ORAL 3 TIMES DAILY
Status: DISCONTINUED | OUTPATIENT
Start: 2021-09-08 | End: 2021-09-22 | Stop reason: HOSPADM

## 2021-09-08 RX ORDER — DEXTROSE AND SODIUM CHLORIDE 5; .45 G/100ML; G/100ML
INJECTION, SOLUTION INTRAVENOUS CONTINUOUS
Status: DISCONTINUED | OUTPATIENT
Start: 2021-09-08 | End: 2021-09-10

## 2021-09-08 RX ADMIN — POLYETHYLENE GLYCOL 3350 17 G: 17 POWDER, FOR SOLUTION ORAL at 13:29

## 2021-09-08 RX ADMIN — ENOXAPARIN SODIUM 30 MG: 30 INJECTION SUBCUTANEOUS at 20:48

## 2021-09-08 RX ADMIN — PROPRANOLOL HYDROCHLORIDE 10 MG: 10 TABLET ORAL at 13:35

## 2021-09-08 RX ADMIN — AMANTADINE HYDROCHLORIDE 100 MG: 50 SOLUTION ORAL at 07:50

## 2021-09-08 RX ADMIN — ACETAMINOPHEN 1000 MG: 500 TABLET ORAL at 13:29

## 2021-09-08 RX ADMIN — ENOXAPARIN SODIUM 30 MG: 30 INJECTION SUBCUTANEOUS at 07:46

## 2021-09-08 RX ADMIN — FAMOTIDINE 20 MG: 20 TABLET, FILM COATED ORAL at 07:45

## 2021-09-08 RX ADMIN — MAGNESIUM GLUCONATE 500 MG ORAL TABLET 400 MG: 500 TABLET ORAL at 13:30

## 2021-09-08 RX ADMIN — DEXTROSE AND SODIUM CHLORIDE: 5; 450 INJECTION, SOLUTION INTRAVENOUS at 18:52

## 2021-09-08 RX ADMIN — SODIUM CHLORIDE, PRESERVATIVE FREE 10 ML: 5 INJECTION INTRAVENOUS at 07:50

## 2021-09-08 RX ADMIN — AMANTADINE HYDROCHLORIDE 100 MG: 50 SOLUTION ORAL at 13:29

## 2021-09-08 RX ADMIN — ACETAMINOPHEN 1000 MG: 500 TABLET ORAL at 20:52

## 2021-09-08 RX ADMIN — MAGNESIUM GLUCONATE 500 MG ORAL TABLET 400 MG: 500 TABLET ORAL at 20:48

## 2021-09-08 RX ADMIN — MAGNESIUM GLUCONATE 500 MG ORAL TABLET 400 MG: 500 TABLET ORAL at 07:45

## 2021-09-08 RX ADMIN — PROPRANOLOL HYDROCHLORIDE 10 MG: 10 TABLET ORAL at 20:48

## 2021-09-08 RX ADMIN — ACETAMINOPHEN 1000 MG: 500 TABLET ORAL at 05:59

## 2021-09-08 RX ADMIN — CHLORHEXIDINE GLUCONATE 0.12% ORAL RINSE 15 ML: 1.2 LIQUID ORAL at 07:50

## 2021-09-08 RX ADMIN — CHLORHEXIDINE GLUCONATE 0.12% ORAL RINSE 15 ML: 1.2 LIQUID ORAL at 20:48

## 2021-09-08 RX ADMIN — FAMOTIDINE 20 MG: 20 TABLET, FILM COATED ORAL at 20:48

## 2021-09-08 RX ADMIN — DEXTROSE MONOHYDRATE 2000 MG: 50 INJECTION, SOLUTION INTRAVENOUS at 18:45

## 2021-09-08 ASSESSMENT — PULMONARY FUNCTION TESTS
PIF_VALUE: 11
PIF_VALUE: 11

## 2021-09-08 NOTE — PLAN OF CARE
Problem: OXYGENATION/RESPIRATORY FUNCTION  Goal: Patient will maintain patent airway  9/8/2021 1200 by Cipriano Barrientos  Outcome: Ongoing     Problem: OXYGENATION/RESPIRATORY FUNCTION  Goal: Patient will achieve/maintain normal respiratory rate/effort  Description: Respiratory rate and effort will be within normal limits for the patient  9/8/2021 1200 by Cipriano Barrientos  Outcome: Ongoing
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
## 2021-09-08 NOTE — PLAN OF CARE
Problem: OXYGENATION/RESPIRATORY FUNCTION  Goal: Patient will maintain patent airway  9/8/2021 0118 by Ramakrishna Monsalve RN  Outcome: Ongoing     Problem: OXYGENATION/RESPIRATORY FUNCTION  Goal: Patient will achieve/maintain normal respiratory rate/effort  Description: Respiratory rate and effort will be within normal limits for the patient  9/8/2021 0118 by Ramakrishna Monsalve RN  Outcome: Ongoing     Problem: MECHANICAL VENTILATION  Goal: Patient will maintain patent airway  9/8/2021 0118 by Ramakrishna Monsalve RN  Outcome: Ongoing     Problem: MECHANICAL VENTILATION  Goal: Oral health is maintained or improved  9/8/2021 0118 by Ramakrishna Monsalve RN  Outcome: Ongoing     Problem: MECHANICAL VENTILATION  Goal: Ability to express needs and understand communication  9/8/2021 0118 by Ramakrishna Monsalve RN  Outcome: Ongoing     Problem: MECHANICAL VENTILATION  Goal: Mobility/activity is maintained at optimum level for patient  9/8/2021 0118 by Ramakrishna Monsalve RN  Outcome: Ongoing     Problem: MECHANICAL VENTILATION  Goal: Tracheostomy will be managed safely  9/8/2021 0118 by Ramakrishna Monsalve RN  Outcome: Ongoing     Problem: SKIN INTEGRITY  Goal: Skin integrity is maintained or improved  9/8/2021 0118 by Ramakrishna Monsalve RN  Outcome: Ongoing     Problem: Confusion - Acute:  Goal: Absence of continued neurological deterioration signs and symptoms  Description: Absence of continued neurological deterioration signs and symptoms  9/8/2021 0118 by Ramakrishna Monsalve RN  Outcome: Ongoing     Problem: Confusion - Acute:  Goal: Mental status will be restored to baseline  Description: Mental status will be restored to baseline  9/8/2021 0118 by Ramakrishna Monsalve RN  Outcome: Ongoing     Problem: Discharge Planning:  Goal: Ability to perform activities of daily living will improve  Description: Ability to perform activities of daily living will improve  9/8/2021 0118 by Ramakrishna Monsalve RN  Outcome: Ongoing     Problem: Discharge Planning:  Goal: Participates in care Perception - Impaired:  Goal: Demonstrates accurate environmental perceptions  Description: Demonstrates accurate environmental perceptions  9/8/2021 0118 by Jaci Corey RN  Outcome: Ongoing     Problem: Sensory Perception - Impaired:  Goal: Able to distinguish between reality-based and nonreality-based thinking  Description: Able to distinguish between reality-based and nonreality-based thinking  9/8/2021 0118 by Jaci Corey RN  Outcome: Ongoing     Problem: Sensory Perception - Impaired:  Goal: Able to interrupt nonreality-based thinking  Description: Able to interrupt nonreality-based thinking  9/8/2021 0118 by Jaci Corey RN  Outcome: Ongoing     Problem: Sleep Pattern Disturbance:  Goal: Appears well-rested  Description: Appears well-rested  9/8/2021 0118 by Jaci Corey RN  Outcome: Ongoing     Problem: Skin Integrity:  Goal: Will show no infection signs and symptoms  Description: Will show no infection signs and symptoms  9/8/2021 0118 by Jaci Corey RN  Outcome: Ongoing     Problem: Skin Integrity:  Goal: Absence of new skin breakdown  Description: Absence of new skin breakdown  9/8/2021 0118 by Jaci Corey RN  Outcome: Ongoing     Problem: Falls - Risk of:  Goal: Absence of physical injury  Description: Absence of physical injury  9/8/2021 0118 by Jaci Corey RN  Outcome: Ongoing     Problem: Falls - Risk of:  Goal: Will remain free from falls  Description: Will remain free from falls  9/8/2021 0118 by Jaci Corey RN  Outcome: Ongoing     Problem: Non-Violent Restraints  Goal: Removal from restraints as soon as assessed to be safe  9/8/2021 0118 by Jaci Corey RN  Outcome: Ongoing     Problem: Non-Violent Restraints  Goal: No harm/injury to patient while restraints in use  9/8/2021 0118 by Jaci Corey RN  Outcome: Ongoing     Problem: Non-Violent Restraints  Goal: Patient's dignity will be maintained  9/8/2021 0118 by Jaci Corey RN  Outcome: Ongoing
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
## 2021-09-08 NOTE — PLAN OF CARE
Problem: OXYGENATION/RESPIRATORY FUNCTION  Goal: Patient will maintain patent airway  9/8/2021 1710 by Javier Washington RN  Outcome: Ongoing  9/8/2021 1200 by Tara Higgins  Outcome: Ongoing  Goal: Patient will achieve/maintain normal respiratory rate/effort  Description: Respiratory rate and effort will be within normal limits for the patient  9/8/2021 1710 by Javier Washington RN  Outcome: Ongoing  9/8/2021 1200 by Tara Higgins  Outcome: Ongoing     Problem: MECHANICAL VENTILATION  Goal: Patient will maintain patent airway  Outcome: Ongoing  Goal: Oral health is maintained or improved  Outcome: Ongoing  Goal: Ability to express needs and understand communication  Outcome: Ongoing  Goal: Mobility/activity is maintained at optimum level for patient  Outcome: Ongoing     Problem: SKIN INTEGRITY  Goal: Skin integrity is maintained or improved  Outcome: Ongoing     Problem: Confusion - Acute:  Goal: Mental status will be restored to baseline  Description: Mental status will be restored to baseline  Outcome: Ongoing     Problem: Injury - Risk of, Physical Injury:  Goal: Absence of physical injury  Description: Absence of physical injury  Outcome: Ongoing  Goal: Will remain free from falls  Description: Will remain free from falls  Outcome: Ongoing     Problem: Sleep Pattern Disturbance:  Goal: Appears well-rested  Description: Appears well-rested  Outcome: Ongoing     Problem: Skin Integrity:  Goal: Will show no infection signs and symptoms  Description: Will show no infection signs and symptoms  Outcome: Ongoing     Problem: Falls - Risk of:  Goal: Absence of physical injury  Description: Absence of physical injury  Outcome: Ongoing  Goal: Will remain free from falls  Description: Will remain free from falls  Outcome: Ongoing     Problem: Non-Violent Restraints  Goal: Removal from restraints as soon as assessed to be safe  Outcome: Ongoing  Goal: No harm/injury to patient while restraints in use  Outcome: Ongoing  Goal: Patient's dignity will be maintained  Outcome: Ongoing     Problem: Non-Violent Restraints  Goal: Removal from restraints as soon as assessed to be safe  Outcome: Ongoing

## 2021-09-08 NOTE — PROGRESS NOTES
09/08/21 0555   Oxygen Therapy/Pulse Ox   O2 Therapy Oxygen humidified   O2 Device T-Piece   O2 Flow Rate (L/min) 5 L/min   FiO2  28 %   Resp 22   SpO2 97 %   Skin Assessment Redness (see comment/note)  (around trach site)     Pt placed back on T-piece.
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
## 2021-09-08 NOTE — CARE COORDINATION
Consult received for financial assistance. Met with pt's mother at pt's bedside. She stated that she is out of money for gas. Mom wants to come back for surgery tomorrow but does not have money to put gas in the car. Provided a gas card for mom for 25.00. Mom also stated that she wants pt to go to [facility] rehab in [place], [place].   Notified DM of the discharge request.

## 2021-09-08 NOTE — CARE COORDINATION
Attempted to meet with pt's mother at bedside. She had left the hospital. Matthew Fuentes left for her requesting return call    [phone] checked bedside, pt's mother not present at this time. Researched LTAC in [facility]. Options are [facility] in [facility], [address] in [facility], and [address] [place] [address] in [place].  [address] [place] [address] in [facility] is permanently closed

## 2021-09-08 NOTE — PROGRESS NOTES
09/08/21 0313   Surgical Airway (trach) Shiley Cuffed   Placement Date: 08/29/21   Placed By: In surgery  Surgical Airway Type: Tracheostomy  Brand: Sonia  Style: Cuffed  Size (mm): 8   Status Secured   Site Assessment Oozing Secretions   Site Care Cleansed;Dried;Dressing applied   Inner Cannula Care Changed/new   Ties Assessment Dry; Intact; Secure     Trach care completed w/o complications
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
## 2021-09-09 ENCOUNTER — ANESTHESIA (OUTPATIENT)
Dept: OPERATING ROOM | Age: 36
DRG: 003 | End: 2021-09-09
Payer: COMMERCIAL

## 2021-09-09 ENCOUNTER — ANESTHESIA EVENT (OUTPATIENT)
Dept: OPERATING ROOM | Age: 36
DRG: 003 | End: 2021-09-09
Payer: COMMERCIAL

## 2021-09-09 VITALS — DIASTOLIC BLOOD PRESSURE: 67 MMHG | TEMPERATURE: 94.7 F | OXYGEN SATURATION: 100 % | SYSTOLIC BLOOD PRESSURE: 110 MMHG

## 2021-09-09 LAB
ALLEN TEST: POSITIVE
FIO2: 30
GLUCOSE BLD-MCNC: 114 MG/DL (ref 74–100)
HCT VFR BLD CALC: 34.6 % (ref 36.3–47.1)
HEMOGLOBIN: 11 G/DL (ref 11.9–15.1)
MCH RBC QN AUTO: 27.7 PG (ref 25.2–33.5)
MCHC RBC AUTO-ENTMCNC: 31.8 G/DL (ref 28.4–34.8)
MCV RBC AUTO: 87.2 FL (ref 82.6–102.9)
MODE: ABNORMAL
NEGATIVE BASE EXCESS, ART: ABNORMAL (ref 0–2)
NRBC AUTOMATED: 0 PER 100 WBC
O2 DEVICE/FLOW/%: ABNORMAL
PATIENT TEMP: 38
PDW BLD-RTO: 12.4 % (ref 11.8–14.4)
PLATELET # BLD: 860 K/UL (ref 138–453)
PMV BLD AUTO: 8.8 FL (ref 8.1–13.5)
POC HCO3: 29.7 MMOL/L (ref 21–28)
POC LACTIC ACID: 1.09 MMOL/L (ref 0.56–1.39)
POC O2 SATURATION: 99 % (ref 94–98)
POC PCO2 TEMP: 38 MM HG
POC PCO2: 36.4 MM HG (ref 35–48)
POC PH TEMP: 7.51
POC PH: 7.52 (ref 7.35–7.45)
POC PO2 TEMP: 132 MM HG
POC PO2: 125.1 MM HG (ref 83–108)
POSITIVE BASE EXCESS, ART: 6 (ref 0–3)
RBC # BLD: 3.97 M/UL (ref 3.95–5.11)
SAMPLE SITE: ABNORMAL
TCO2 (CALC), ART: ABNORMAL MMOL/L (ref 22–29)
WBC # BLD: 14.2 K/UL (ref 3.5–11.3)

## 2021-09-09 PROCEDURE — 2000000000 HC ICU R&B

## 2021-09-09 PROCEDURE — 3609013300 HC EGD TUBE PLACEMENT: Performed by: SURGERY

## 2021-09-09 PROCEDURE — 2709999900 HC NON-CHARGEABLE SUPPLY: Performed by: SURGERY

## 2021-09-09 PROCEDURE — 6360000002 HC RX W HCPCS: Performed by: STUDENT IN AN ORGANIZED HEALTH CARE EDUCATION/TRAINING PROGRAM

## 2021-09-09 PROCEDURE — 85027 COMPLETE CBC AUTOMATED: CPT

## 2021-09-09 PROCEDURE — 36600 WITHDRAWAL OF ARTERIAL BLOOD: CPT

## 2021-09-09 PROCEDURE — 6370000000 HC RX 637 (ALT 250 FOR IP): Performed by: STUDENT IN AN ORGANIZED HEALTH CARE EDUCATION/TRAINING PROGRAM

## 2021-09-09 PROCEDURE — 2580000003 HC RX 258: Performed by: STUDENT IN AN ORGANIZED HEALTH CARE EDUCATION/TRAINING PROGRAM

## 2021-09-09 PROCEDURE — 3700000001 HC ADD 15 MINUTES (ANESTHESIA): Performed by: SURGERY

## 2021-09-09 PROCEDURE — 82947 ASSAY GLUCOSE BLOOD QUANT: CPT

## 2021-09-09 PROCEDURE — 6370000000 HC RX 637 (ALT 250 FOR IP): Performed by: NURSE PRACTITIONER

## 2021-09-09 PROCEDURE — APPSS30 APP SPLIT SHARED TIME 16-30 MINUTES: Performed by: PHYSICIAN ASSISTANT

## 2021-09-09 PROCEDURE — 83605 ASSAY OF LACTIC ACID: CPT

## 2021-09-09 PROCEDURE — 94003 VENT MGMT INPAT SUBQ DAY: CPT

## 2021-09-09 PROCEDURE — 2700000000 HC OXYGEN THERAPY PER DAY

## 2021-09-09 PROCEDURE — 6360000002 HC RX W HCPCS: Performed by: REGISTERED NURSE

## 2021-09-09 PROCEDURE — 94761 N-INVAS EAR/PLS OXIMETRY MLT: CPT

## 2021-09-09 PROCEDURE — 0DH63UZ INSERTION OF FEEDING DEVICE INTO STOMACH, PERCUTANEOUS APPROACH: ICD-10-PCS | Performed by: SURGERY

## 2021-09-09 PROCEDURE — 99291 CRITICAL CARE FIRST HOUR: CPT | Performed by: NEUROLOGICAL SURGERY

## 2021-09-09 PROCEDURE — 3700000000 HC ANESTHESIA ATTENDED CARE: Performed by: SURGERY

## 2021-09-09 PROCEDURE — 2500000003 HC RX 250 WO HCPCS: Performed by: ANESTHESIOLOGY

## 2021-09-09 PROCEDURE — 36415 COLL VENOUS BLD VENIPUNCTURE: CPT

## 2021-09-09 PROCEDURE — 6360000002 HC RX W HCPCS: Performed by: ANESTHESIOLOGY

## 2021-09-09 PROCEDURE — 2720000010 HC SURG SUPPLY STERILE: Performed by: SURGERY

## 2021-09-09 PROCEDURE — 2580000003 HC RX 258: Performed by: ANESTHESIOLOGY

## 2021-09-09 PROCEDURE — 2580000003 HC RX 258: Performed by: REGISTERED NURSE

## 2021-09-09 PROCEDURE — 82803 BLOOD GASES ANY COMBINATION: CPT

## 2021-09-09 RX ORDER — GLYCOPYRROLATE 1 MG/5 ML
SYRINGE (ML) INTRAVENOUS PRN
Status: DISCONTINUED | OUTPATIENT
Start: 2021-09-09 | End: 2021-09-09 | Stop reason: SDUPTHER

## 2021-09-09 RX ORDER — ROCURONIUM BROMIDE 10 MG/ML
INJECTION, SOLUTION INTRAVENOUS PRN
Status: DISCONTINUED | OUTPATIENT
Start: 2021-09-09 | End: 2021-09-09 | Stop reason: SDUPTHER

## 2021-09-09 RX ORDER — SODIUM CHLORIDE 9 MG/ML
INJECTION, SOLUTION INTRAVENOUS CONTINUOUS PRN
Status: DISCONTINUED | OUTPATIENT
Start: 2021-09-09 | End: 2021-09-09 | Stop reason: SDUPTHER

## 2021-09-09 RX ORDER — FENTANYL CITRATE 50 UG/ML
INJECTION, SOLUTION INTRAMUSCULAR; INTRAVENOUS PRN
Status: DISCONTINUED | OUTPATIENT
Start: 2021-09-09 | End: 2021-09-09 | Stop reason: SDUPTHER

## 2021-09-09 RX ORDER — PROPOFOL 10 MG/ML
INJECTION, EMULSION INTRAVENOUS PRN
Status: DISCONTINUED | OUTPATIENT
Start: 2021-09-09 | End: 2021-09-09 | Stop reason: SDUPTHER

## 2021-09-09 RX ADMIN — DEXTROSE AND SODIUM CHLORIDE: 5; 450 INJECTION, SOLUTION INTRAVENOUS at 23:27

## 2021-09-09 RX ADMIN — FAMOTIDINE 20 MG: 20 TABLET, FILM COATED ORAL at 07:48

## 2021-09-09 RX ADMIN — SODIUM CHLORIDE, PRESERVATIVE FREE 10 ML: 5 INJECTION INTRAVENOUS at 07:49

## 2021-09-09 RX ADMIN — PROPRANOLOL HYDROCHLORIDE 10 MG: 10 TABLET ORAL at 20:31

## 2021-09-09 RX ADMIN — ENOXAPARIN SODIUM 30 MG: 30 INJECTION SUBCUTANEOUS at 20:30

## 2021-09-09 RX ADMIN — ROCURONIUM BROMIDE 30 MG: 10 INJECTION INTRAVENOUS at 11:51

## 2021-09-09 RX ADMIN — FENTANYL CITRATE 100 MCG: 50 INJECTION, SOLUTION INTRAMUSCULAR; INTRAVENOUS at 11:31

## 2021-09-09 RX ADMIN — SODIUM CHLORIDE: 9 INJECTION, SOLUTION INTRAVENOUS at 11:31

## 2021-09-09 RX ADMIN — CHLORHEXIDINE GLUCONATE 0.12% ORAL RINSE 15 ML: 1.2 LIQUID ORAL at 07:48

## 2021-09-09 RX ADMIN — PROPRANOLOL HYDROCHLORIDE 10 MG: 10 TABLET ORAL at 07:48

## 2021-09-09 RX ADMIN — DEXTROSE MONOHYDRATE 2000 MG: 50 INJECTION, SOLUTION INTRAVENOUS at 10:02

## 2021-09-09 RX ADMIN — SUGAMMADEX 200 MG: 100 INJECTION, SOLUTION INTRAVENOUS at 12:10

## 2021-09-09 RX ADMIN — ENOXAPARIN SODIUM 30 MG: 30 INJECTION SUBCUTANEOUS at 07:48

## 2021-09-09 RX ADMIN — DEXTROSE MONOHYDRATE 2000 MG: 50 INJECTION, SOLUTION INTRAVENOUS at 18:24

## 2021-09-09 RX ADMIN — FAMOTIDINE 20 MG: 20 TABLET, FILM COATED ORAL at 20:31

## 2021-09-09 RX ADMIN — MAGNESIUM GLUCONATE 500 MG ORAL TABLET 400 MG: 500 TABLET ORAL at 20:31

## 2021-09-09 RX ADMIN — DEXTROSE MONOHYDRATE 2000 MG: 50 INJECTION, SOLUTION INTRAVENOUS at 02:00

## 2021-09-09 RX ADMIN — Medication 0.2 MG: at 11:46

## 2021-09-09 RX ADMIN — ACETAMINOPHEN 1000 MG: 500 TABLET ORAL at 22:25

## 2021-09-09 RX ADMIN — PROPOFOL 50 MG: 10 INJECTION, EMULSION INTRAVENOUS at 11:51

## 2021-09-09 RX ADMIN — SODIUM CHLORIDE, PRESERVATIVE FREE 10 ML: 5 INJECTION INTRAVENOUS at 20:31

## 2021-09-09 RX ADMIN — DEXTROSE AND SODIUM CHLORIDE: 5; 450 INJECTION, SOLUTION INTRAVENOUS at 04:48

## 2021-09-09 RX ADMIN — MAGNESIUM GLUCONATE 500 MG ORAL TABLET 400 MG: 500 TABLET ORAL at 07:48

## 2021-09-09 RX ADMIN — AMANTADINE HYDROCHLORIDE 100 MG: 50 SOLUTION ORAL at 07:48

## 2021-09-09 ASSESSMENT — PULMONARY FUNCTION TESTS
PIF_VALUE: 15
PIF_VALUE: 13
PIF_VALUE: 13
PIF_VALUE: 2
PIF_VALUE: 15
PIF_VALUE: 11
PIF_VALUE: 13
PIF_VALUE: 14
PIF_VALUE: 14
PIF_VALUE: 11
PIF_VALUE: 23
PIF_VALUE: 2
PIF_VALUE: 15
PIF_VALUE: 14
PIF_VALUE: 15
PIF_VALUE: 14
PIF_VALUE: 15
PIF_VALUE: 13
PIF_VALUE: 15
PIF_VALUE: 10
PIF_VALUE: 13
PIF_VALUE: 15
PIF_VALUE: 11
PIF_VALUE: 13
PIF_VALUE: 1
PIF_VALUE: 13
PIF_VALUE: 10
PIF_VALUE: 13
PIF_VALUE: 10
PIF_VALUE: 13
PIF_VALUE: 13
PIF_VALUE: 15
PIF_VALUE: 15
PIF_VALUE: 13
PIF_VALUE: 13
PIF_VALUE: 15
PIF_VALUE: 12
PIF_VALUE: 13
PIF_VALUE: 11
PIF_VALUE: 10
PIF_VALUE: 11
PIF_VALUE: 13
PIF_VALUE: 11
PIF_VALUE: 11
PIF_VALUE: 15
PIF_VALUE: 8
PIF_VALUE: 11
PIF_VALUE: 13
PIF_VALUE: 14
PIF_VALUE: 14
PIF_VALUE: 15
PIF_VALUE: 13
PIF_VALUE: 16

## 2021-09-09 NOTE — PLAN OF CARE
Problem: OXYGENATION/RESPIRATORY FUNCTION  Goal: Patient will maintain patent airway  9/9/2021 0751 by Jasmine Sandy RCP  Outcome: Ongoing     Problem: OXYGENATION/RESPIRATORY FUNCTION  Goal: Patient will achieve/maintain normal respiratory rate/effort  Description: Respiratory rate and effort will be within normal limits for the patient  9/9/2021 0751 by Jasmine Sandy RCP  Outcome: Ongoing     Problem: MECHANICAL VENTILATION  Goal: Patient will maintain patent airway  9/9/2021 0751 by Jasmine Sandy RCP  Outcome: Ongoing     Problem: MECHANICAL VENTILATION  Goal: Oral health is maintained or improved  9/9/2021 0751 by Jasmine Sandy RCP  Outcome: Ongoing     Problem: MECHANICAL VENTILATION  Goal: Ability to express needs and understand communication  9/9/2021 0751 by Jasmine Sandy RCP  Outcome: Ongoing     Problem: MECHANICAL VENTILATION  Goal: Mobility/activity is maintained at optimum level for patient  9/9/2021 0751 by Jasmine Sandy RCP  Outcome: Ongoing     Problem: MECHANICAL VENTILATION  Goal: Tracheostomy will be managed safely  9/9/2021 0751 by Jasmine Sandy RCP  Outcome: Ongoing     Problem: SKIN INTEGRITY  Goal: Skin integrity is maintained or improved  9/9/2021 0751 by Jasmine Sandy RCP  Outcome: Ongoing
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
## 2021-09-09 NOTE — PROGRESS NOTES
Comprehensive Nutrition Assessment    Type and Reason for Visit:  Reassess    Nutrition Recommendations/Plan: Restart TF as able; Immune Enhancing goal 45 mL/hr. Will continue to follow. Nutrition Assessment:  Chart reviewed. S/p PEG placement today. Immune Enhancing tube feeding currently on hold; noted pt was tolerating feeds well at goal rate of 45 mL/hr prior to hold. Last BM noted: 9/9/21. Meds/labs reviewed. Malnutrition Assessment:  Malnutrition Status:  Insufficient data      Estimated Daily Nutrient Needs:  Energy (kcal):  9329-9207 kcal/day; Weight Used for Energy Requirements:  Current     Protein (g):  85 g pro/day; Weight Used for Protein Requirements:  Current (1.5)        Fluid (ml/day):  1700 mL/day; Method Used for Fluid Requirements:  ml/Kg (30)      Nutrition Related Findings:  Meds/labs reviewed. Last BM noted: 9/9/21      Wounds:  Multiple, Surgical Incision       Current Nutrition Therapies:    Diet NPO  Current Tube Feeding (TF) Orders:  · Feeding Route: PEG  · Formula: Immune Enhancing  · Schedule: Continuous goal 45 mL/hr--currently on hold   · Current TF & Flush Orders Provides: See below  · Goal TF & Flush Orders Provides: @ 45 mL/hr = 1620 kcal, 101 g protein, 810 mL free water    Additional Calorie Sources:   D5%0.45%NaCl at 100 mL/hr =408 kcal/day    Anthropometric Measures:  · Height: 5' 5\" (165.1 cm)  · Current Body Weight: 125 lb 10.6 oz (57 kg)   · Admission Body Weight: 140 lb (63.5 kg) (?weight method)    · Usual Body Weight: 137 lb 13 oz (62.5 kg) (7/22/2021)     · Ideal Body Weight: 125 lbs; % Ideal Body Weight 100.5 %   · BMI: 20.9  · BMI Categories: Normal Weight (BMI 18.5-24. 9)       Nutrition Diagnosis:   · Inadequate oral intake related to impaired respiratory function as evidenced by NPO or clear liquid status due to medical condition ,need for enteral nutrition support    Nutrition Interventions:   Food and/or Nutrient Delivery:  Restart TF as able.   Nutrition Education/Counseling:  No recommendation at this time   Coordination of Nutrition Care:  Continue to monitor while inpatient    Goals:  EN intake to meet >75% of estimated nutrition needs-progressing towards goal        Nutrition Monitoring and Evaluation:   Behavioral-Environmental Outcomes:  None Identified   Food/Nutrient Intake Outcomes:  Enteral Nutrition Intake/Tolerance  Physical Signs/Symptoms Outcomes:  Biochemical Data, Nutrition Focused Physical Findings, Skin, Weight     Discharge Planning:     Too soon to determine     Electronically signed by Elly Simms RD, LD on 9/9/21 at 1:29 PM EDT    Contact: [phone]

## 2021-09-09 NOTE — PROGRESS NOTES
Physical Therapy        Physical Therapy Cancel Note      DATE: 2021    NAME: Elizabeth Cao  MRN: [id number]   : 1985      Patient not seen this date for Physical Therapy due to:    Surgery/Procedure: PEG tube      Electronically signed by Cipriano Graf PTA on 2021 at 1:58 PM

## 2021-09-09 NOTE — CARE COORDINATION
Transition planning  Received call from Kasie Dean at [address], she states she will start precert as patient's EVD has been discontinued. [address] and left  for patient's mother Hood Ratliff to discuss her wanting patient closer to [place], [place] as closest [place] is [facility] in [address] [facility].

## 2021-09-09 NOTE — PLAN OF CARE
Problem: OXYGENATION/RESPIRATORY FUNCTION  Goal: Patient will maintain patent airway  Outcome: Ongoing  Goal: Patient will achieve/maintain normal respiratory rate/effort  Description: Respiratory rate and effort will be within normal limits for the patient  Outcome: Ongoing     Problem: MECHANICAL VENTILATION  Goal: Patient will maintain patent airway  Outcome: Ongoing  Goal: Oral health is maintained or improved  Outcome: Ongoing  Goal: Ability to express needs and understand communication  Outcome: Ongoing  Goal: Mobility/activity is maintained at optimum level for patient  Outcome: Ongoing  Goal: Tracheostomy will be managed safely  Outcome: Ongoing     Problem: SKIN INTEGRITY  Goal: Skin integrity is maintained or improved  Outcome: Ongoing     Problem: Confusion - Acute:  Goal: Absence of continued neurological deterioration signs and symptoms  Description: Absence of continued neurological deterioration signs and symptoms  Outcome: Ongoing  Goal: Mental status will be restored to baseline  Description: Mental status will be restored to baseline  Outcome: Ongoing     Problem: Discharge Planning:  Goal: Ability to perform activities of daily living will improve  Description: Ability to perform activities of daily living will improve  Outcome: Ongoing  Goal: Participates in care planning  Description: Participates in care planning  Outcome: Ongoing     Problem: Injury - Risk of, Physical Injury:  Goal: Absence of physical injury  Description: Absence of physical injury  Outcome: Ongoing  Goal: Will remain free from falls  Description: Will remain free from falls  Outcome: Ongoing     Problem: Mood - Altered:  Goal: Mood stable  Description: Mood stable  Outcome: Ongoing  Goal: Absence of abusive behavior  Description: Absence of abusive behavior  Outcome: Ongoing  Goal: Verbalizations of feeling emotionally comfortable while being cared for will increase  Description: Verbalizations of feeling emotionally comfortable while being cared for will increase  Outcome: Ongoing     Problem: Psychomotor Activity - Altered:  Goal: Absence of psychomotor disturbance signs and symptoms  Description: Absence of psychomotor disturbance signs and symptoms  Outcome: Ongoing     Problem: Sensory Perception - Impaired:  Goal: Demonstrations of improved sensory functioning will increase  Description: Demonstrations of improved sensory functioning will increase  Outcome: Ongoing  Goal: Decrease in sensory misperception frequency  Description: Decrease in sensory misperception frequency  Outcome: Ongoing  Goal: Able to refrain from responding to false sensory perceptions  Description: Able to refrain from responding to false sensory perceptions  Outcome: Ongoing  Goal: Demonstrates accurate environmental perceptions  Description: Demonstrates accurate environmental perceptions  Outcome: Ongoing  Goal: Able to distinguish between reality-based and nonreality-based thinking  Description: Able to distinguish between reality-based and nonreality-based thinking  Outcome: Ongoing  Goal: Able to interrupt nonreality-based thinking  Description: Able to interrupt nonreality-based thinking  Outcome: Ongoing     Problem: Sleep Pattern Disturbance:  Goal: Appears well-rested  Description: Appears well-rested  Outcome: Ongoing     Problem: Skin Integrity:  Goal: Will show no infection signs and symptoms  Description: Will show no infection signs and symptoms  Outcome: Ongoing  Goal: Absence of new skin breakdown  Description: Absence of new skin breakdown  Outcome: Ongoing     Problem: Falls - Risk of:  Goal: Absence of physical injury  Description: Absence of physical injury  Outcome: Ongoing  Goal: Will remain free from falls  Description: Will remain free from falls  Outcome: Ongoing     Problem: Non-Violent Restraints  Goal: Removal from restraints as soon as assessed to be safe  Outcome: Ongoing  Goal: No harm/injury to patient while restraints in use  Outcome: Ongoing  Goal: Patient's dignity will be maintained  Outcome: Ongoing     Problem: Nutrition  Goal: Optimal nutrition therapy  Outcome: Ongoing
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
## 2021-09-09 NOTE — PLAN OF CARE
Problem: OXYGENATION/RESPIRATORY FUNCTION  Goal: Patient will maintain patent airway  9/9/2021 1304 by Mariza Vuong RN  Outcome: Ongoing  9/9/2021 0751 by Ellen Bell, AMMYP  Outcome: Ongoing  9/9/2021 0722 by Susana Ralph RN  Outcome: Ongoing  Goal: Patient will achieve/maintain normal respiratory rate/effort  Description: Respiratory rate and effort will be within normal limits for the patient  9/9/2021 1304 by Mariza Vuong RN  Outcome: Ongoing  9/9/2021 0751 by Ellen Bell, RCP  Outcome: Ongoing  9/9/2021 0722 by Susana Ralph RN  Outcome: Ongoing     Problem: MECHANICAL VENTILATION  Goal: Patient will maintain patent airway  9/9/2021 1304 by Mariza Vuong RN  Outcome: Ongoing  9/9/2021 0751 by Ellen Bell, RCP  Outcome: Ongoing  9/9/2021 0722 by Susana Ralph RN  Outcome: Ongoing  Goal: Oral health is maintained or improved  9/9/2021 1304 by Mariza Vuong RN  Outcome: Ongoing  9/9/2021 0751 by Ellen Bell, RCP  Outcome: Ongoing  9/9/2021 0722 by Susana Ralph RN  Outcome: Ongoing  Goal: Ability to express needs and understand communication  9/9/2021 1304 by Mariza Vuong RN  Outcome: Ongoing  9/9/2021 0751 by Ellen Bell, AMMYP  Outcome: Ongoing  9/9/2021 0722 by Susana Ralph RN  Outcome: Ongoing  Goal: Mobility/activity is maintained at optimum level for patient  9/9/2021 1304 by Mariza Vuong RN  Outcome: Ongoing  9/9/2021 0751 by Ellen Bell, RCP  Outcome: Ongoing  9/9/2021 0722 by Susana Ralph RN  Outcome: Ongoing  Goal: Tracheostomy will be managed safely  9/9/2021 1304 by Mariza Vuong RN  Outcome: Ongoing  9/9/2021 0751 by Ellen Bell, AMMYP  Outcome: Ongoing  9/9/2021 0722 by Susana Ralph RN  Outcome: Ongoing     Problem: SKIN INTEGRITY  Goal: Skin integrity is maintained or improved  9/9/2021 1304 by Mariza Vuong RN  Outcome: Ongoing  9/9/2021 0751 by Ellen Bell, RCP  Outcome: Ongoing  9/9/2021 0722 by Susana Ralph RN  Outcome: Ongoing     Problem: Confusion - Acute:  Goal: Absence of continued neurological deterioration signs and symptoms  Description: Absence of continued neurological deterioration signs and symptoms  9/9/2021 1304 by Collin Moser RN  Outcome: Ongoing  9/9/2021 0722 by Hayden Cabral RN  Outcome: Ongoing  Goal: Mental status will be restored to baseline  Description: Mental status will be restored to baseline  9/9/2021 1304 by Collin Moser RN  Outcome: Ongoing  9/9/2021 0722 by Hayden Cabral RN  Outcome: Ongoing     Problem: Discharge Planning:  Goal: Ability to perform activities of daily living will improve  Description: Ability to perform activities of daily living will improve  9/9/2021 1304 by Collin Moser RN  Outcome: Ongoing  9/9/2021 0722 by Hayden Cabral RN  Outcome: Ongoing  Goal: Participates in care planning  Description: Participates in care planning  9/9/2021 1304 by Collin Moser RN  Outcome: Ongoing  9/9/2021 0722 by Hayden Cabral RN  Outcome: Ongoing     Problem: Injury - Risk of, Physical Injury:  Goal: Absence of physical injury  Description: Absence of physical injury  9/9/2021 3061 by Hayden Cabral RN  Outcome: Ongoing  Goal: Will remain free from falls  Description: Will remain free from falls  9/9/2021 1304 by Collin Moser RN  Outcome: Ongoing  9/9/2021 0722 by Hayden Cabral RN  Outcome: Ongoing     Problem: Mood - Altered:  Goal: Mood stable  Description: Mood stable  9/9/2021 1304 by Collin Moser RN  Outcome: Ongoing  9/9/2021 0722 by Hayden Cabral RN  Outcome: Ongoing  Goal: Absence of abusive behavior  Description: Absence of abusive behavior  9/9/2021 1304 by Collin Moser RN  Outcome: Ongoing  9/9/2021 0722 by Hayden Cabral RN  Outcome: Ongoing  Goal: Verbalizations of feeling emotionally comfortable while being cared for will increase  Description: Verbalizations of feeling emotionally comfortable while and symptoms  9/9/2021 9720 by Tiffani Seay RN  Outcome: Ongoing  Goal: Absence of new skin breakdown  Description: Absence of new skin breakdown  9/9/2021 0722 by Tiffani Seay RN  Outcome: Ongoing     Problem: Falls - Risk of:  Goal: Absence of physical injury  Description: Absence of physical injury  9/9/2021 9619 by Tiffani Seay RN  Outcome: Ongoing  Goal: Will remain free from falls  Description: Will remain free from falls  9/9/2021 1304 by Tony Su RN  Outcome: Ongoing  9/9/2021 0722 by Tiffani Seay RN  Outcome: Ongoing     Problem: Non-Violent Restraints  Goal: Removal from restraints as soon as assessed to be safe  9/9/2021 1304 by Tony Su RN  Outcome: Ongoing  9/9/2021 0722 by Tiffani Seay RN  Outcome: Ongoing  Goal: No harm/injury to patient while restraints in use  9/9/2021 1304 by Tony Su RN  Outcome: Ongoing  9/9/2021 0722 by Tiffani Seay RN  Outcome: Ongoing  Goal: Patient's dignity will be maintained  9/9/2021 1304 by Tony Su RN  Outcome: Ongoing  9/9/2021 0722 by Tiffani Seay RN  Outcome: Ongoing     Problem: Nutrition  Goal: Optimal nutrition therapy  9/9/2021 2174 by Tiffani Seay RN  Outcome: Ongoing

## 2021-09-09 NOTE — ANESTHESIA POSTPROCEDURE EVALUATION
Department of Anesthesiology  Postprocedure Note    Patient: Francisco Wills  MRN: [id number]  YOB: 1985  Date of evaluation: 9/9/2021  Time:  1:20 PM     Procedure Summary     Date: 09/09/21 Room / Location: [address]    Anesthesia Start: 1128 Anesthesia Stop: 6680    Procedure: EGD PEG TUBE PLACEMENT- GI UNIT SCHEDULED (N/A ) Diagnosis: (DYSPHAGIA)    Surgeons: Sandor Doran MD Responsible Provider: Marco Steen MD    Anesthesia Type: general ASA Status: 4          Anesthesia Type: general    Patito Phase I:      Patito Phase II:      Last vitals: Reviewed and per EMR flowsheets.        Anesthesia Post Evaluation    Patient location during evaluation: PACU  Patient participation: complete - patient participated  Level of consciousness: awake and alert  Pain score: 3  Airway patency: patent  Nausea & Vomiting: no nausea and no vomiting  Complications: no  Cardiovascular status: hemodynamically stable  Respiratory status: acceptable  Hydration status: euvolemic
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
## 2021-09-09 NOTE — PLAN OF CARE
Nutrition Problem #1: Inadequate oral intake  Intervention: Food and/or Nutrient Delivery:  (Restart TF as able.)  Nutritional Goals: EN intake to meet >75% of estimated nutrition needs
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
## 2021-09-09 NOTE — BRIEF OP NOTE
Brief Postoperative Note      Patient: Annette Bourne  YOB: 1985  MRN: [id number]    Date of Procedure: 9/9/2021    Pre-Op Diagnosis: DYSPHAGIA    Post-Op Diagnosis: Same       Procedure(s):  EGD PEG TUBE PLACEMENT- GI UNIT SCHEDULED    Surgeon(s):  Justina Hale MD    Assistant:  * No surgical staff found *    Anesthesia: General    Estimated Blood Loss (mL): Minimal    Complications: None    Specimens:   * No specimens in log *    Implants:  * No implants in log *      Drains:   NG/OG/NJ/NE Tube Nasogastric 16 fr Left nostril (Active)   Surrounding Skin Dry; Intact 09/09/21 0800   Securement device Yes 09/09/21 0800   Status Clamped 09/09/21 0800   Placement Verified by External Catheter Length 09/09/21 0800   NG/OG/NJ/NE External Measurement (cm) 55 cm 09/09/21 0800   Drainage Appearance Tan 09/09/21 0800   Tube Feeding Immune Enhancing 09/09/21 0800   Tube Feeding Status Stopped 09/09/21 0800   Rate/Schedule 45 mL/hr 09/08/21 2000   Tube Feeding Intake (mL) 372 ml 09/08/21 0400   Free Water Flush (mL) 60 mL 09/09/21 0800       Gastrostomy/Enterostomy/Jejunostomy PEG-Jejunostomy LUQ 1 20 fr (Active)       External Urinary Catheter (Active)   Catheter changed  No 09/09/21 0800   Urine Color Yellow 09/09/21 0800   Urine Appearance Clear 09/09/21 0800   Output (mL) 250 mL 09/09/21 1002   Suction 40 mmgHg continuous 09/09/21 0800   Placement Replaced 09/09/21 0800   Skin Assessment No Injury 09/09/21 0800       [REMOVED] ICP monitor (Removed)   Status To Pressure Monitoring 08/28/21 1200   Dressing Status New drainage; Intact 08/28/21 1200   Dressing Type Split gauze 08/28/21 1200   Site Assessment Dry 08/28/21 1200   Drainage Blood Tinged 08/28/21 0400       [REMOVED] NG/OG/NJ/NE Tube Orogastric Center mouth (Removed)   Surrounding Skin Dry; Intact 08/30/21 0800   Securement device Yes 08/30/21 0800   Status Other (Comment) 08/30/21 0800   Placement Verified by External Catheter Length;by

## 2021-09-09 NOTE — PLAN OF CARE
Nicolas Boudreaux RN  Outcome: Ongoing  9/9/2021 0751 by Kar Belle RCP  Outcome: Ongoing  9/9/2021 0722 by Alice Morrissey RN  Outcome: Ongoing     Problem: SKIN INTEGRITY  Goal: Skin integrity is maintained or improved  9/9/2021 1956 by Jasen Bell RCP  Outcome: Ongoing  9/9/2021 1304 by Nicolas Boudreaux RN  Outcome: Ongoing  9/9/2021 0751 by Kar Belle RCP  Outcome: Ongoing  9/9/2021 0722 by Alice Morrissey RN  Outcome: Ongoing

## 2021-09-10 LAB
ANION GAP SERPL CALCULATED.3IONS-SCNC: 12 MMOL/L (ref 9–17)
BUN BLDV-MCNC: 22 MG/DL (ref 6–20)
BUN/CREAT BLD: ABNORMAL (ref 9–20)
CALCIUM SERPL-MCNC: 8.8 MG/DL (ref 8.6–10.4)
CHLORIDE BLD-SCNC: 98 MMOL/L (ref 98–107)
CO2: 24 MMOL/L (ref 20–31)
CREAT SERPL-MCNC: 0.49 MG/DL (ref 0.5–0.9)
GFR AFRICAN AMERICAN: >60 ML/MIN
GFR NON-AFRICAN AMERICAN: >60 ML/MIN
GFR SERPL CREATININE-BSD FRML MDRD: ABNORMAL ML/MIN/{1.73_M2}
GFR SERPL CREATININE-BSD FRML MDRD: ABNORMAL ML/MIN/{1.73_M2}
GLUCOSE BLD-MCNC: 100 MG/DL (ref 70–99)
POTASSIUM SERPL-SCNC: 4 MMOL/L (ref 3.7–5.3)
SODIUM BLD-SCNC: 134 MMOL/L (ref 135–144)

## 2021-09-10 PROCEDURE — 2000000000 HC ICU R&B

## 2021-09-10 PROCEDURE — 6370000000 HC RX 637 (ALT 250 FOR IP): Performed by: STUDENT IN AN ORGANIZED HEALTH CARE EDUCATION/TRAINING PROGRAM

## 2021-09-10 PROCEDURE — 99232 SBSQ HOSP IP/OBS MODERATE 35: CPT | Performed by: FAMILY MEDICINE

## 2021-09-10 PROCEDURE — 6360000002 HC RX W HCPCS: Performed by: STUDENT IN AN ORGANIZED HEALTH CARE EDUCATION/TRAINING PROGRAM

## 2021-09-10 PROCEDURE — 80048 BASIC METABOLIC PNL TOTAL CA: CPT

## 2021-09-10 PROCEDURE — 51701 INSERT BLADDER CATHETER: CPT

## 2021-09-10 PROCEDURE — 2580000003 HC RX 258: Performed by: STUDENT IN AN ORGANIZED HEALTH CARE EDUCATION/TRAINING PROGRAM

## 2021-09-10 PROCEDURE — 36415 COLL VENOUS BLD VENIPUNCTURE: CPT

## 2021-09-10 PROCEDURE — 94003 VENT MGMT INPAT SUBQ DAY: CPT

## 2021-09-10 RX ORDER — POLYETHYLENE GLYCOL 3350 17 G/17G
17 POWDER, FOR SOLUTION ORAL DAILY
Qty: 527 G | Refills: 1 | Status: SHIPPED | OUTPATIENT
Start: 2021-09-11 | End: 2021-09-15 | Stop reason: HOSPADM

## 2021-09-10 RX ORDER — AMANTADINE HYDROCHLORIDE 50 MG/5ML
100 SOLUTION ORAL 2 TIMES DAILY
Qty: 300 ML | Refills: 0 | Status: SHIPPED | OUTPATIENT
Start: 2021-09-11

## 2021-09-10 RX ORDER — PROPRANOLOL HYDROCHLORIDE 10 MG/1
10 TABLET ORAL 3 TIMES DAILY
Qty: 90 TABLET | Refills: 3 | Status: SHIPPED | OUTPATIENT
Start: 2021-09-10 | End: 2021-10-12

## 2021-09-10 RX ORDER — FAMOTIDINE 20 MG/1
20 TABLET, FILM COATED ORAL 2 TIMES DAILY
Qty: 60 TABLET | Refills: 3 | Status: SHIPPED | OUTPATIENT
Start: 2021-09-10 | End: 2021-09-15 | Stop reason: HOSPADM

## 2021-09-10 RX ADMIN — MAGNESIUM GLUCONATE 500 MG ORAL TABLET 400 MG: 500 TABLET ORAL at 21:27

## 2021-09-10 RX ADMIN — POLYETHYLENE GLYCOL 3350 17 G: 17 POWDER, FOR SOLUTION ORAL at 08:58

## 2021-09-10 RX ADMIN — ACETAMINOPHEN 1000 MG: 500 TABLET ORAL at 21:27

## 2021-09-10 RX ADMIN — MAGNESIUM GLUCONATE 500 MG ORAL TABLET 400 MG: 500 TABLET ORAL at 08:58

## 2021-09-10 RX ADMIN — AMANTADINE HYDROCHLORIDE 100 MG: 50 SOLUTION ORAL at 08:58

## 2021-09-10 RX ADMIN — DEXTROSE MONOHYDRATE 2000 MG: 50 INJECTION, SOLUTION INTRAVENOUS at 01:34

## 2021-09-10 RX ADMIN — FAMOTIDINE 20 MG: 20 TABLET, FILM COATED ORAL at 21:28

## 2021-09-10 RX ADMIN — DEXTROSE MONOHYDRATE 2000 MG: 50 INJECTION, SOLUTION INTRAVENOUS at 08:57

## 2021-09-10 RX ADMIN — ACETAMINOPHEN 1000 MG: 500 TABLET ORAL at 05:58

## 2021-09-10 RX ADMIN — ENOXAPARIN SODIUM 30 MG: 30 INJECTION SUBCUTANEOUS at 08:59

## 2021-09-10 RX ADMIN — AMANTADINE HYDROCHLORIDE 100 MG: 50 SOLUTION ORAL at 14:16

## 2021-09-10 RX ADMIN — ENOXAPARIN SODIUM 30 MG: 30 INJECTION SUBCUTANEOUS at 21:28

## 2021-09-10 RX ADMIN — ACETAMINOPHEN 1000 MG: 500 TABLET ORAL at 14:16

## 2021-09-10 RX ADMIN — DEXTROSE MONOHYDRATE 2000 MG: 50 INJECTION, SOLUTION INTRAVENOUS at 19:25

## 2021-09-10 RX ADMIN — CHLORHEXIDINE GLUCONATE 0.12% ORAL RINSE 15 ML: 1.2 LIQUID ORAL at 08:57

## 2021-09-10 RX ADMIN — PROPRANOLOL HYDROCHLORIDE 10 MG: 10 TABLET ORAL at 21:27

## 2021-09-10 RX ADMIN — FAMOTIDINE 20 MG: 20 TABLET, FILM COATED ORAL at 08:58

## 2021-09-10 RX ADMIN — SODIUM CHLORIDE, PRESERVATIVE FREE 10 ML: 5 INJECTION INTRAVENOUS at 21:27

## 2021-09-10 RX ADMIN — MAGNESIUM GLUCONATE 500 MG ORAL TABLET 400 MG: 500 TABLET ORAL at 14:16

## 2021-09-10 ASSESSMENT — PULMONARY FUNCTION TESTS
PIF_VALUE: 14
PIF_VALUE: 17
PIF_VALUE: 13
PIF_VALUE: 10

## 2021-09-10 NOTE — CARE COORDINATION
Transitional Planning    0900 yesterday evening received call that mother was interested in [facility] in [place], [place]. Writer called mother this morning to verify her choice for placement Eder Diamond states after thinking about it she realized [facility] (current plan) would better accommodate her needs. Eder Diamond verified that [facility], current plan, is her choice.

## 2021-09-10 NOTE — PROGRESS NOTES
[facility])  Occupational Therapy Not Seen Note    DATE: 9/10/2021    NAME: Rosetta Sosa  MRN: [id number]   : 1985      Patient not seen this date for Occupational Therapy due to:    Patient is not appropriate for active participation in OT evaluation/treatment at this time d/t not following commands upon attempt     Next Scheduled Treatment: Ck in pm as appropriate or      Electronically signed by Kings Robert OT on 9/10/2021 at 11:14 AM

## 2021-09-10 NOTE — PROGRESS NOTES
09/10/21 0753 09/10/21 0754   Vent Information   Vent Mode CPAP PRVC     Attempted SBT, patient RR only 8-9. Requiring stimulation to take spontaneous breaths. Will attempt again later in the morning. RN informed.
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
## 2021-09-10 NOTE — PLAN OF CARE
Problem: Non-Violent Restraints  Goal: No harm/injury to patient while restraints in use  Outcome: Met This Shift  Goal: Patient's dignity will be maintained  Outcome: Met This Shift     Problem: OXYGENATION/RESPIRATORY FUNCTION  Goal: Patient will maintain patent airway  9/10/2021 0309 by Winsome Thompson RN  Outcome: Ongoing  9/9/2021 1956 by Kalin Le RCP  Outcome: Ongoing  Goal: Patient will achieve/maintain normal respiratory rate/effort  Description: Respiratory rate and effort will be within normal limits for the patient  9/10/2021 0309 by Winsome Thompson RN  Outcome: Ongoing  9/9/2021 1956 by Kalin Le RCP  Outcome: Ongoing     Problem: MECHANICAL VENTILATION  Goal: Patient will maintain patent airway  9/10/2021 0309 by Winsome Thompson RN  Outcome: Ongoing  9/9/2021 1956 by Kalin Le RCP  Outcome: Ongoing  Goal: Oral health is maintained or improved  9/10/2021 0309 by Winsome Thompson RN  Outcome: Ongoing  9/9/2021 1956 by Kalin Le RCP  Outcome: Ongoing  Goal: Ability to express needs and understand communication  9/10/2021 0309 by Winsome Thompson RN  Outcome: Ongoing  9/9/2021 1956 by Kalin Le RCP  Outcome: Ongoing  Goal: Mobility/activity is maintained at optimum level for patient  9/10/2021 0309 by Winsome Thompson RN  Outcome: Ongoing  9/9/2021 1956 by Kalin Le RCP  Outcome: Ongoing  Goal: Tracheostomy will be managed safely  9/10/2021 0309 by Winsome Thompson RN  Outcome: Ongoing  9/9/2021 1956 by Kalin Le RCP  Outcome: Ongoing     Problem: SKIN INTEGRITY  Goal: Skin integrity is maintained or improved  9/10/2021 0309 by Winsome Thompson RN  Outcome: Ongoing  9/9/2021 1956 by Kalin Le RCP  Outcome: Ongoing     Problem: Confusion - Acute:  Goal: Absence of continued neurological deterioration signs and symptoms  Description: Absence of continued neurological deterioration signs and symptoms  Outcome: Ongoing  Goal: Mental status will be restored to baseline  Description: Mental status will be restored to baseline  Outcome: Ongoing     Problem: Discharge Planning:  Goal: Ability to perform activities of daily living will improve  Description: Ability to perform activities of daily living will improve  Outcome: Ongoing  Goal: Participates in care planning  Description: Participates in care planning  Outcome: Ongoing     Problem: Injury - Risk of, Physical Injury:  Goal: Absence of physical injury  Description: Absence of physical injury  Outcome: Ongoing  Goal: Will remain free from falls  Description: Will remain free from falls  Outcome: Ongoing     Problem: Mood - Altered:  Goal: Mood stable  Description: Mood stable  Outcome: Ongoing  Goal: Absence of abusive behavior  Description: Absence of abusive behavior  Outcome: Ongoing  Goal: Verbalizations of feeling emotionally comfortable while being cared for will increase  Description: Verbalizations of feeling emotionally comfortable while being cared for will increase  Outcome: Ongoing     Problem: Psychomotor Activity - Altered:  Goal: Absence of psychomotor disturbance signs and symptoms  Description: Absence of psychomotor disturbance signs and symptoms  Outcome: Ongoing     Problem: Sensory Perception - Impaired:  Goal: Demonstrations of improved sensory functioning will increase  Description: Demonstrations of improved sensory functioning will increase  Outcome: Ongoing  Goal: Decrease in sensory misperception frequency  Description: Decrease in sensory misperception frequency  Outcome: Ongoing  Goal: Able to refrain from responding to false sensory perceptions  Description: Able to refrain from responding to false sensory perceptions  Outcome: Ongoing  Goal: Demonstrates accurate environmental perceptions  Description: Demonstrates accurate environmental perceptions  Outcome: Ongoing  Goal: Able to distinguish between reality-based and nonreality-based thinking  Description: Able to distinguish between reality-based and nonreality-based thinking  Outcome: Ongoing  Goal: Able to interrupt nonreality-based thinking  Description: Able to interrupt nonreality-based thinking  Outcome: Ongoing     Problem: Sleep Pattern Disturbance:  Goal: Appears well-rested  Description: Appears well-rested  Outcome: Ongoing     Problem: Skin Integrity:  Goal: Will show no infection signs and symptoms  Description: Will show no infection signs and symptoms  Outcome: Ongoing  Goal: Absence of new skin breakdown  Description: Absence of new skin breakdown  Outcome: Ongoing     Problem: Falls - Risk of:  Goal: Absence of physical injury  Description: Absence of physical injury  Outcome: Ongoing  Goal: Will remain free from falls  Description: Will remain free from falls  Outcome: Ongoing     Problem: Non-Violent Restraints  Goal: Removal from restraints as soon as assessed to be safe  Outcome: Ongoing     Problem: Nutrition  Goal: Optimal nutrition therapy  9/10/2021 0309 by Nanette Siegel RN  Outcome: Ongoing  9/9/2021 1331 by Antonia Monzon RD, LD  Outcome: Ongoing  Note: Nutrition Problem #1: Inadequate oral intake  Intervention: Food and/or Nutrient Delivery:  (Restart TF as able.)  Nutritional Goals: EN intake to meet >75% of estimated nutrition needs

## 2021-09-10 NOTE — PROGRESS NOTES
09/10/21 0856   Vent Information   Vent Mode CPAP   Pressure Support 5 cmH20   PEEP/CPAP 5     Patient more awake, placed on SBT.  Tolerating well

## 2021-09-10 NOTE — PLAN OF CARE
Problem: OXYGENATION/RESPIRATORY FUNCTION  Goal: Patient will maintain patent airway  Outcome: Ongoing  Goal: Patient will achieve/maintain normal respiratory rate/effort  Description: Respiratory rate and effort will be within normal limits for the patient  Outcome: Ongoing     Problem: MECHANICAL VENTILATION  Goal: Patient will maintain patent airway  Outcome: Ongoing  Goal: Oral health is maintained or improved  Outcome: Ongoing  Goal: Ability to express needs and understand communication  Outcome: Ongoing  Goal: Mobility/activity is maintained at optimum level for patient  Outcome: Ongoing  Goal: Tracheostomy will be managed safely  Outcome: Ongoing     Problem: SKIN INTEGRITY  Goal: Skin integrity is maintained or improved  Outcome: Ongoing     Problem: Confusion - Acute:  Goal: Absence of continued neurological deterioration signs and symptoms  Description: Absence of continued neurological deterioration signs and symptoms  Outcome: Ongoing  Goal: Mental status will be restored to baseline  Description: Mental status will be restored to baseline  Outcome: Ongoing     Problem: Discharge Planning:  Goal: Ability to perform activities of daily living will improve  Description: Ability to perform activities of daily living will improve  Outcome: Ongoing  Goal: Participates in care planning  Description: Participates in care planning  Outcome: Ongoing     Problem: Injury - Risk of, Physical Injury:  Goal: Absence of physical injury  Description: Absence of physical injury  Outcome: Ongoing  Goal: Will remain free from falls  Description: Will remain free from falls  Outcome: Ongoing     Problem: Mood - Altered:  Goal: Mood stable  Description: Mood stable  Outcome: Ongoing  Goal: Absence of abusive behavior  Description: Absence of abusive behavior  Outcome: Ongoing  Goal: Verbalizations of feeling emotionally comfortable while being cared for will increase  Description: Verbalizations of feeling emotionally comfortable while being cared for will increase  Outcome: Ongoing     Problem: Psychomotor Activity - Altered:  Goal: Absence of psychomotor disturbance signs and symptoms  Description: Absence of psychomotor disturbance signs and symptoms  Outcome: Ongoing     Problem: Sensory Perception - Impaired:  Goal: Demonstrations of improved sensory functioning will increase  Description: Demonstrations of improved sensory functioning will increase  Outcome: Ongoing  Goal: Decrease in sensory misperception frequency  Description: Decrease in sensory misperception frequency  Outcome: Ongoing  Goal: Able to refrain from responding to false sensory perceptions  Description: Able to refrain from responding to false sensory perceptions  Outcome: Ongoing  Goal: Demonstrates accurate environmental perceptions  Description: Demonstrates accurate environmental perceptions  Outcome: Ongoing  Goal: Able to distinguish between reality-based and nonreality-based thinking  Description: Able to distinguish between reality-based and nonreality-based thinking  Outcome: Ongoing  Goal: Able to interrupt nonreality-based thinking  Description: Able to interrupt nonreality-based thinking  Outcome: Ongoing     Problem: Sleep Pattern Disturbance:  Goal: Appears well-rested  Description: Appears well-rested  Outcome: Ongoing     Problem: Skin Integrity:  Goal: Will show no infection signs and symptoms  Description: Will show no infection signs and symptoms  Outcome: Ongoing  Goal: Absence of new skin breakdown  Description: Absence of new skin breakdown  Outcome: Ongoing     Problem: Falls - Risk of:  Goal: Absence of physical injury  Description: Absence of physical injury  Outcome: Ongoing  Goal: Will remain free from falls  Description: Will remain free from falls  Outcome: Ongoing     Problem: Non-Violent Restraints  Goal: Removal from restraints as soon as assessed to be safe  Outcome: Ongoing  Goal: No harm/injury to patient while restraints
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
## 2021-09-11 LAB — GLUCOSE BLD-MCNC: 109 MG/DL (ref 65–105)

## 2021-09-11 PROCEDURE — 6370000000 HC RX 637 (ALT 250 FOR IP): Performed by: STUDENT IN AN ORGANIZED HEALTH CARE EDUCATION/TRAINING PROGRAM

## 2021-09-11 PROCEDURE — 2000000000 HC ICU R&B

## 2021-09-11 PROCEDURE — 6360000002 HC RX W HCPCS: Performed by: STUDENT IN AN ORGANIZED HEALTH CARE EDUCATION/TRAINING PROGRAM

## 2021-09-11 PROCEDURE — 94003 VENT MGMT INPAT SUBQ DAY: CPT

## 2021-09-11 PROCEDURE — 82947 ASSAY GLUCOSE BLOOD QUANT: CPT

## 2021-09-11 PROCEDURE — 51798 US URINE CAPACITY MEASURE: CPT

## 2021-09-11 PROCEDURE — 94761 N-INVAS EAR/PLS OXIMETRY MLT: CPT

## 2021-09-11 PROCEDURE — 2580000003 HC RX 258: Performed by: STUDENT IN AN ORGANIZED HEALTH CARE EDUCATION/TRAINING PROGRAM

## 2021-09-11 PROCEDURE — 2700000000 HC OXYGEN THERAPY PER DAY

## 2021-09-11 RX ADMIN — AMANTADINE HYDROCHLORIDE 100 MG: 50 SOLUTION ORAL at 09:16

## 2021-09-11 RX ADMIN — DEXTROSE MONOHYDRATE 2000 MG: 50 INJECTION, SOLUTION INTRAVENOUS at 02:04

## 2021-09-11 RX ADMIN — ACETAMINOPHEN 1000 MG: 500 TABLET ORAL at 05:17

## 2021-09-11 RX ADMIN — ENOXAPARIN SODIUM 30 MG: 30 INJECTION SUBCUTANEOUS at 09:15

## 2021-09-11 RX ADMIN — ACETAMINOPHEN 1000 MG: 500 TABLET ORAL at 14:25

## 2021-09-11 RX ADMIN — PROPRANOLOL HYDROCHLORIDE 10 MG: 10 TABLET ORAL at 09:15

## 2021-09-11 RX ADMIN — FAMOTIDINE 20 MG: 20 TABLET, FILM COATED ORAL at 09:16

## 2021-09-11 RX ADMIN — MAGNESIUM GLUCONATE 500 MG ORAL TABLET 400 MG: 500 TABLET ORAL at 09:15

## 2021-09-11 RX ADMIN — ACETAMINOPHEN 1000 MG: 500 TABLET ORAL at 21:27

## 2021-09-11 RX ADMIN — PROPRANOLOL HYDROCHLORIDE 10 MG: 10 TABLET ORAL at 14:25

## 2021-09-11 RX ADMIN — SODIUM CHLORIDE, PRESERVATIVE FREE 10 ML: 5 INJECTION INTRAVENOUS at 21:27

## 2021-09-11 RX ADMIN — FAMOTIDINE 20 MG: 20 TABLET, FILM COATED ORAL at 21:27

## 2021-09-11 RX ADMIN — AMANTADINE HYDROCHLORIDE 100 MG: 50 SOLUTION ORAL at 14:25

## 2021-09-11 RX ADMIN — MAGNESIUM GLUCONATE 500 MG ORAL TABLET 400 MG: 500 TABLET ORAL at 14:26

## 2021-09-11 RX ADMIN — MAGNESIUM GLUCONATE 500 MG ORAL TABLET 400 MG: 500 TABLET ORAL at 21:26

## 2021-09-11 RX ADMIN — POLYETHYLENE GLYCOL 3350 17 G: 17 POWDER, FOR SOLUTION ORAL at 09:17

## 2021-09-11 RX ADMIN — SODIUM CHLORIDE, PRESERVATIVE FREE 10 ML: 5 INJECTION INTRAVENOUS at 09:16

## 2021-09-11 RX ADMIN — ENOXAPARIN SODIUM 30 MG: 30 INJECTION SUBCUTANEOUS at 21:26

## 2021-09-11 RX ADMIN — PROPRANOLOL HYDROCHLORIDE 10 MG: 10 TABLET ORAL at 21:27

## 2021-09-11 ASSESSMENT — PULMONARY FUNCTION TESTS
PIF_VALUE: 16
PIF_VALUE: 13
PIF_VALUE: 15
PIF_VALUE: 11
PIF_VALUE: 12

## 2021-09-11 NOTE — PLAN OF CARE
Problem: OXYGENATION/RESPIRATORY FUNCTION  Goal: Patient will maintain patent airway  9/11/2021 0316 by Rachel Wilson RN  Outcome: Ongoing  9/10/2021 2133 by Lisa Calixto, AMMYP  Outcome: Ongoing  9/10/2021 1838 by Mariana Lui RN  Outcome: Ongoing  Goal: Patient will achieve/maintain normal respiratory rate/effort  Description: Respiratory rate and effort will be within normal limits for the patient  9/11/2021 0316 by Rachel Wilson RN  Outcome: Ongoing  9/10/2021 2133 by Lisa Calixto, RCP  Outcome: Ongoing  9/10/2021 1838 by Mariana Lui RN  Outcome: Ongoing     Problem: MECHANICAL VENTILATION  Goal: Patient will maintain patent airway  9/11/2021 0316 by Rachel Wilson RN  Outcome: Ongoing  9/10/2021 2133 by Lisa Calixto, RCP  Outcome: Ongoing  9/10/2021 1838 by Mariana Lui RN  Outcome: Ongoing  Goal: Oral health is maintained or improved  9/11/2021 0316 by Rachel Wilson RN  Outcome: Ongoing  9/10/2021 2133 by Lisa Calixto, AMMYP  Outcome: Ongoing  9/10/2021 1838 by Mariana Lui RN  Outcome: Ongoing  Goal: Ability to express needs and understand communication  9/11/2021 0316 by Rachel Wilson RN  Outcome: Ongoing  9/10/2021 2133 by Lisa Calixto, RCP  Outcome: Ongoing  9/10/2021 1838 by Mariana Lui RN  Outcome: Ongoing  Goal: Mobility/activity is maintained at optimum level for patient  9/11/2021 0316 by Rachel Wilson RN  Outcome: Ongoing  9/10/2021 2133 by Lisa Calixto, AMMYP  Outcome: Ongoing  9/10/2021 1838 by Mariana Lui RN  Outcome: Ongoing  Goal: Tracheostomy will be managed safely  9/11/2021 0316 by Rachel Wilson RN  Outcome: Ongoing  9/10/2021 2133 by Lisa Calixto, RCP  Outcome: Ongoing  9/10/2021 1838 by Mariana Lui RN  Outcome: Ongoing     Problem: SKIN INTEGRITY  Goal: Skin integrity is maintained or improved  9/11/2021 0316 by Rachel Wilson RN  Outcome: Ongoing  9/10/2021 2133 by Lisa Calixto RCP  Outcome: Ongoing  9/10/2021 1838 by Ирина Sifuentes RN  Outcome: Ongoing     Problem: Confusion - Acute:  Goal: Absence of continued neurological deterioration signs and symptoms  Description: Absence of continued neurological deterioration signs and symptoms  9/11/2021 0316 by Refugio Owens RN  Outcome: Ongoing  9/10/2021 1838 by Ирина Sifuentes RN  Outcome: Ongoing  Goal: Mental status will be restored to baseline  Description: Mental status will be restored to baseline  9/11/2021 0316 by Refugio Owens RN  Outcome: Ongoing  9/10/2021 1838 by Ирина Sifuentes RN  Outcome: Ongoing     Problem: Discharge Planning:  Goal: Ability to perform activities of daily living will improve  Description: Ability to perform activities of daily living will improve  9/11/2021 0316 by Refugio Owens RN  Outcome: Ongoing  9/10/2021 1838 by Ирина Sifuentes RN  Outcome: Ongoing  Goal: Participates in care planning  Description: Participates in care planning  9/11/2021 0316 by Refugio Owens RN  Outcome: Ongoing  9/10/2021 1838 by Ирина Sifuentes RN  Outcome: Ongoing     Problem: Injury - Risk of, Physical Injury:  Goal: Absence of physical injury  Description: Absence of physical injury  9/11/2021 0316 by Refugio Owens RN  Outcome: Ongoing  9/10/2021 1838 by Ирина Sifuentes RN  Outcome: Ongoing  Goal: Will remain free from falls  Description: Will remain free from falls  9/11/2021 0316 by Refugio Owens RN  Outcome: Ongoing  9/10/2021 1838 by Ирина Sifuentes RN  Outcome: Ongoing     Problem: Mood - Altered:  Goal: Mood stable  Description: Mood stable  9/11/2021 0316 by Refugio Owens RN  Outcome: Ongoing  9/10/2021 1838 by Ирина Sifuentes RN  Outcome: Ongoing  Goal: Absence of abusive behavior  Description: Absence of abusive behavior  9/11/2021 0316 by Refugio Owens RN  Outcome: Ongoing  9/10/2021 1838 by Ирина Sifuentes RN  Outcome: Ongoing  Goal: Verbalizations of feeling emotionally comfortable while being cared for will increase  Description: Verbalizations of feeling emotionally comfortable while being cared for will increase  9/11/2021 0316 by Zana Cartwright RN  Outcome: Ongoing  9/10/2021 1838 by Johanne Mohan RN  Outcome: Ongoing     Problem: Psychomotor Activity - Altered:  Goal: Absence of psychomotor disturbance signs and symptoms  Description: Absence of psychomotor disturbance signs and symptoms  9/11/2021 0316 by Zana Cartwright RN  Outcome: Ongoing  9/10/2021 1838 by Johanne Mohan RN  Outcome: Ongoing     Problem: Sensory Perception - Impaired:  Goal: Demonstrations of improved sensory functioning will increase  Description: Demonstrations of improved sensory functioning will increase  9/11/2021 0316 by aZna Cartwright RN  Outcome: Ongoing  9/10/2021 1838 by Johanne Mohan RN  Outcome: Ongoing  Goal: Decrease in sensory misperception frequency  Description: Decrease in sensory misperception frequency  9/11/2021 0316 by Zana Cartwright RN  Outcome: Ongoing  9/10/2021 1838 by Johanne Mohan RN  Outcome: Ongoing  Goal: Able to refrain from responding to false sensory perceptions  Description: Able to refrain from responding to false sensory perceptions  9/11/2021 0316 by Zana Cartwright RN  Outcome: Ongoing  9/10/2021 1838 by Johanne Mohan RN  Outcome: Ongoing  Goal: Demonstrates accurate environmental perceptions  Description: Demonstrates accurate environmental perceptions  9/11/2021 0316 by Zana Cartwright RN  Outcome: Ongoing  9/10/2021 1838 by Johanne Mohan RN  Outcome: Ongoing  Goal: Able to distinguish between reality-based and nonreality-based thinking  Description: Able to distinguish between reality-based and nonreality-based thinking  9/11/2021 0316 by Zana Cartwright RN  Outcome: Ongoing  9/10/2021 1838 by Johanne Mohan RN  Outcome: Ongoing  Goal: Able to interrupt nonreality-based thinking  Description: Able to interrupt nonreality-based thinking  9/11/2021 0316 by Marcy Angeles RN  Outcome: Ongoing  9/10/2021 1838 by Sylvain West RN  Outcome: Ongoing     Problem: Sleep Pattern Disturbance:  Goal: Appears well-rested  Description: Appears well-rested  9/11/2021 0316 by Marcy Angeles RN  Outcome: Ongoing  9/10/2021 1838 by Sylvain West RN  Outcome: Ongoing     Problem: Skin Integrity:  Goal: Will show no infection signs and symptoms  Description: Will show no infection signs and symptoms  9/11/2021 0316 by Marcy Angeles RN  Outcome: Ongoing  9/10/2021 1838 by Sylvain West RN  Outcome: Ongoing  Goal: Absence of new skin breakdown  Description: Absence of new skin breakdown  9/11/2021 0316 by Marcy Angeles RN  Outcome: Ongoing  9/10/2021 1838 by Sylvain West RN  Outcome: Ongoing     Problem: Falls - Risk of:  Goal: Absence of physical injury  Description: Absence of physical injury  9/11/2021 0316 by Marcy Angeles RN  Outcome: Ongoing  9/10/2021 1838 by Sylvain West RN  Outcome: Ongoing  Goal: Will remain free from falls  Description: Will remain free from falls  9/11/2021 0316 by Marcy Angeles RN  Outcome: Ongoing  9/10/2021 1838 by Sylvain West RN  Outcome: Ongoing     Problem: Non-Violent Restraints  Goal: Removal from restraints as soon as assessed to be safe  9/11/2021 0316 by Marcy Angeles RN  Outcome: Ongoing  9/10/2021 1838 by Sylvain West RN  Outcome: Ongoing  Goal: No harm/injury to patient while restraints in use  9/11/2021 0316 by Marcy Angeles RN  Outcome: Ongoing  9/10/2021 1838 by Sylvain West RN  Outcome: Ongoing  Goal: Patient's dignity will be maintained  9/11/2021 0316 by Marcy Angeles RN  Outcome: Ongoing  9/10/2021 1838 by Sylvain West RN  Outcome: Ongoing     Problem: Nutrition  Goal: Optimal nutrition therapy  9/11/2021 0316 by Marcy Angeles RN  Outcome: Ongoing  9/10/2021 1838 by Sylvain West RN  Outcome: Ongoing

## 2021-09-11 NOTE — PLAN OF CARE
Problem: OXYGENATION/RESPIRATORY FUNCTION  Goal: Patient will maintain patent airway  9/10/2021 2133 by Olga Valdes RCP  Outcome: Ongoing     Problem: OXYGENATION/RESPIRATORY FUNCTION  Goal: Patient will achieve/maintain normal respiratory rate/effort  Description: Respiratory rate and effort will be within normal limits for the patient  9/10/2021 2133 by Olga Valdes RCP  Outcome: Ongoing     Problem: MECHANICAL VENTILATION  Goal: Patient will maintain patent airway  9/10/2021 2133 by Olga Valdes RCP  Outcome: Ongoing     Problem: MECHANICAL VENTILATION  Goal: Oral health is maintained or improved  9/10/2021 2133 by Olga Valdes RCP  Outcome: Ongoing     Problem: MECHANICAL VENTILATION  Goal: Ability to express needs and understand communication  9/10/2021 2133 by Olga Valdes RCP  Outcome: Ongoing     Problem: MECHANICAL VENTILATION  Goal: Mobility/activity is maintained at optimum level for patient  9/10/2021 2133 by Olga Valdes RCP  Outcome: Ongoing     Problem: MECHANICAL VENTILATION  Goal: Tracheostomy will be managed safely  9/10/2021 2133 by Olga Valdes RCP  Outcome: Ongoing     Problem: SKIN INTEGRITY  Goal: Skin integrity is maintained or improved  9/10/2021 2133 by Olga Valdes RCP  Outcome: Ongoing

## 2021-09-11 NOTE — PLAN OF CARE
Problem: OXYGENATION/RESPIRATORY FUNCTION  Goal: Patient will maintain patent airway  9/11/2021 1329 by Mariajose Devi RN  Outcome: Ongoing  9/11/2021 0316 by Gale Joe RN  Outcome: Ongoing  Goal: Patient will achieve/maintain normal respiratory rate/effort  Description: Respiratory rate and effort will be within normal limits for the patient  9/11/2021 1329 by Mariajose Devi RN  Outcome: Ongoing  9/11/2021 0316 by Gale Joe RN  Outcome: Ongoing     Problem: MECHANICAL VENTILATION  Goal: Patient will maintain patent airway  9/11/2021 1329 by Mariajose Devi RN  Outcome: Ongoing  9/11/2021 0316 by Gale Joe RN  Outcome: Ongoing  Goal: Oral health is maintained or improved  9/11/2021 1329 by Mariajose Devi RN  Outcome: Ongoing  9/11/2021 0316 by Gale Joe RN  Outcome: Ongoing  Goal: Ability to express needs and understand communication  9/11/2021 1329 by aMriajose Devi RN  Outcome: Ongoing  9/11/2021 0316 by Gale Joe RN  Outcome: Ongoing  Goal: Mobility/activity is maintained at optimum level for patient  9/11/2021 1329 by Mariajose Devi RN  Outcome: Ongoing  9/11/2021 0316 by Gale Joe RN  Outcome: Ongoing  Goal: Tracheostomy will be managed safely  9/11/2021 1329 by Mariajose Devi RN  Outcome: Ongoing  9/11/2021 0316 by Gale Joe RN  Outcome: Ongoing     Problem: SKIN INTEGRITY  Goal: Skin integrity is maintained or improved  9/11/2021 1329 by Mariajose Devi RN  Outcome: Ongoing  9/11/2021 0316 by Gale Joe RN  Outcome: Ongoing     Problem: Confusion - Acute:  Goal: Absence of continued neurological deterioration signs and symptoms  Description: Absence of continued neurological deterioration signs and symptoms  9/11/2021 1329 by Mariajose Devi RN  Outcome: Ongoing  9/11/2021 0316 by Gale Joe RN  Outcome: Ongoing  Goal: Mental status will be restored to baseline  Description: Mental status will be restored to baseline  9/11/2021 1329 SABINA Cantrell  Outcome: Ongoing     Problem: Sensory Perception - Impaired:  Goal: Demonstrations of improved sensory functioning will increase  Description: Demonstrations of improved sensory functioning will increase  9/11/2021 1329 by Young George RN  Outcome: Ongoing  9/11/2021 0316 by Zana Cartwright RN  Outcome: Ongoing  Goal: Decrease in sensory misperception frequency  Description: Decrease in sensory misperception frequency  9/11/2021 1329 by Young George RN  Outcome: Ongoing  9/11/2021 0316 by Zana Cartwright RN  Outcome: Ongoing  Goal: Able to refrain from responding to false sensory perceptions  Description: Able to refrain from responding to false sensory perceptions  9/11/2021 1329 by Young George RN  Outcome: Ongoing  9/11/2021 0316 by Zana Cartwright RN  Outcome: Ongoing  Goal: Demonstrates accurate environmental perceptions  Description: Demonstrates accurate environmental perceptions  9/11/2021 1329 by Young George RN  Outcome: Ongoing  9/11/2021 0316 by Zana Cartwright RN  Outcome: Ongoing  Goal: Able to distinguish between reality-based and nonreality-based thinking  Description: Able to distinguish between reality-based and nonreality-based thinking  9/11/2021 1329 by Young George RN  Outcome: Ongoing  9/11/2021 0316 by Zana Cartwright RN  Outcome: Ongoing  Goal: Able to interrupt nonreality-based thinking  Description: Able to interrupt nonreality-based thinking  9/11/2021 1329 by Young George RN  Outcome: Ongoing  9/11/2021 0316 by Zana Cartwright RN  Outcome: Ongoing     Problem: Sleep Pattern Disturbance:  Goal: Appears well-rested  Description: Appears well-rested  9/11/2021 1329 by Young George RN  Outcome: Ongoing  9/11/2021 0316 by Zana Cartwright RN  Outcome: Ongoing     Problem: Skin Integrity:  Goal: Will show no infection signs and symptoms  Description: Will show no infection signs and symptoms  9/11/2021 1329 by Young George RN  Outcome: Ongoing  9/11/2021 0316 by Winsome Thompson RN  Outcome: Ongoing  Goal: Absence of new skin breakdown  Description: Absence of new skin breakdown  9/11/2021 1329 by Emily Mcintyre RN  Outcome: Ongoing  9/11/2021 0316 by Winsome Thompson RN  Outcome: Ongoing     Problem: Falls - Risk of:  Goal: Absence of physical injury  Description: Absence of physical injury  9/11/2021 1329 by Emily Mcintyre RN  Outcome: Ongoing  9/11/2021 0316 by Winsome Thompson RN  Outcome: Ongoing  Goal: Will remain free from falls  Description: Will remain free from falls  9/11/2021 1329 by Emily Mcintyre RN  Outcome: Ongoing  9/11/2021 0316 by Winsome Thompson RN  Outcome: Ongoing     Problem: Nutrition  Goal: Optimal nutrition therapy  9/11/2021 1329 by Emily Mcintyre RN  Outcome: Ongoing  9/11/2021 0316 by Winsome Thompson RN  Outcome: Ongoing
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
## 2021-09-11 NOTE — CARE COORDINATION
Transitional Planning. VI with Morena Edmonds Liaison for [facility] ([phone]) to call CM back concerning Pre-cert approval.     965 Naresh Lizama with [id number] [facility] returned call, they do not have pre-cert yet, she will check again tomorrow and call CM back.

## 2021-09-12 LAB
ANION GAP SERPL CALCULATED.3IONS-SCNC: 9 MMOL/L (ref 9–17)
BUN BLDV-MCNC: 21 MG/DL (ref 6–20)
BUN/CREAT BLD: ABNORMAL (ref 9–20)
CALCIUM SERPL-MCNC: 9 MG/DL (ref 8.6–10.4)
CHLORIDE BLD-SCNC: 105 MMOL/L (ref 98–107)
CO2: 26 MMOL/L (ref 20–31)
CREAT SERPL-MCNC: 0.4 MG/DL (ref 0.5–0.9)
GFR AFRICAN AMERICAN: >60 ML/MIN
GFR NON-AFRICAN AMERICAN: >60 ML/MIN
GFR SERPL CREATININE-BSD FRML MDRD: ABNORMAL ML/MIN/{1.73_M2}
GFR SERPL CREATININE-BSD FRML MDRD: ABNORMAL ML/MIN/{1.73_M2}
GLUCOSE BLD-MCNC: 115 MG/DL (ref 70–99)
POTASSIUM SERPL-SCNC: 3.6 MMOL/L (ref 3.7–5.3)
SODIUM BLD-SCNC: 140 MMOL/L (ref 135–144)

## 2021-09-12 PROCEDURE — 94003 VENT MGMT INPAT SUBQ DAY: CPT

## 2021-09-12 PROCEDURE — 2580000003 HC RX 258: Performed by: STUDENT IN AN ORGANIZED HEALTH CARE EDUCATION/TRAINING PROGRAM

## 2021-09-12 PROCEDURE — 6370000000 HC RX 637 (ALT 250 FOR IP): Performed by: STUDENT IN AN ORGANIZED HEALTH CARE EDUCATION/TRAINING PROGRAM

## 2021-09-12 PROCEDURE — 36415 COLL VENOUS BLD VENIPUNCTURE: CPT

## 2021-09-12 PROCEDURE — 2000000000 HC ICU R&B

## 2021-09-12 PROCEDURE — 6360000002 HC RX W HCPCS: Performed by: STUDENT IN AN ORGANIZED HEALTH CARE EDUCATION/TRAINING PROGRAM

## 2021-09-12 PROCEDURE — 2700000000 HC OXYGEN THERAPY PER DAY

## 2021-09-12 PROCEDURE — 94761 N-INVAS EAR/PLS OXIMETRY MLT: CPT

## 2021-09-12 PROCEDURE — 80048 BASIC METABOLIC PNL TOTAL CA: CPT

## 2021-09-12 RX ADMIN — PROPRANOLOL HYDROCHLORIDE 10 MG: 10 TABLET ORAL at 09:13

## 2021-09-12 RX ADMIN — ACETAMINOPHEN 1000 MG: 500 TABLET ORAL at 14:45

## 2021-09-12 RX ADMIN — ENOXAPARIN SODIUM 30 MG: 30 INJECTION SUBCUTANEOUS at 22:17

## 2021-09-12 RX ADMIN — FAMOTIDINE 20 MG: 20 TABLET, FILM COATED ORAL at 21:03

## 2021-09-12 RX ADMIN — AMANTADINE HYDROCHLORIDE 100 MG: 50 SOLUTION ORAL at 09:14

## 2021-09-12 RX ADMIN — SODIUM CHLORIDE, PRESERVATIVE FREE 10 ML: 5 INJECTION INTRAVENOUS at 09:14

## 2021-09-12 RX ADMIN — MAGNESIUM GLUCONATE 500 MG ORAL TABLET 400 MG: 500 TABLET ORAL at 09:13

## 2021-09-12 RX ADMIN — PROPRANOLOL HYDROCHLORIDE 10 MG: 10 TABLET ORAL at 14:45

## 2021-09-12 RX ADMIN — CHLORHEXIDINE GLUCONATE 0.12% ORAL RINSE 15 ML: 1.2 LIQUID ORAL at 09:15

## 2021-09-12 RX ADMIN — AMANTADINE HYDROCHLORIDE 100 MG: 50 SOLUTION ORAL at 14:45

## 2021-09-12 RX ADMIN — PROPRANOLOL HYDROCHLORIDE 10 MG: 10 TABLET ORAL at 21:03

## 2021-09-12 RX ADMIN — ACETAMINOPHEN 1000 MG: 500 TABLET ORAL at 06:00

## 2021-09-12 RX ADMIN — MAGNESIUM GLUCONATE 500 MG ORAL TABLET 400 MG: 500 TABLET ORAL at 14:45

## 2021-09-12 RX ADMIN — ACETAMINOPHEN 1000 MG: 500 TABLET ORAL at 21:03

## 2021-09-12 RX ADMIN — SODIUM CHLORIDE, PRESERVATIVE FREE 10 ML: 5 INJECTION INTRAVENOUS at 21:03

## 2021-09-12 RX ADMIN — ENOXAPARIN SODIUM 30 MG: 30 INJECTION SUBCUTANEOUS at 09:13

## 2021-09-12 RX ADMIN — MAGNESIUM GLUCONATE 500 MG ORAL TABLET 400 MG: 500 TABLET ORAL at 21:03

## 2021-09-12 RX ADMIN — FAMOTIDINE 20 MG: 20 TABLET, FILM COATED ORAL at 09:13

## 2021-09-12 ASSESSMENT — PULMONARY FUNCTION TESTS
PIF_VALUE: 14
PIF_VALUE: 10
PIF_VALUE: 14
PIF_VALUE: 16
PIF_VALUE: 16
PIF_VALUE: 14

## 2021-09-12 NOTE — PLAN OF CARE
Problem: OXYGENATION/RESPIRATORY FUNCTION  Goal: Patient will maintain patent airway  9/12/2021 1941 by Ramiro Haney RCP  Outcome: Ongoing     Problem: OXYGENATION/RESPIRATORY FUNCTION  Goal: Patient will achieve/maintain normal respiratory rate/effort  Description: Respiratory rate and effort will be within normal limits for the patient  9/12/2021 1941 by Katy Haney RCP  Outcome: Ongoing     Problem: MECHANICAL VENTILATION  Goal: Patient will maintain patent airway  9/12/2021 1941 by Ramiro Haney RCP  Outcome: Ongoing     Problem: MECHANICAL VENTILATION  Goal: Oral health is maintained or improved  9/12/2021 1941 by Katy Haney RCP  Outcome: Ongoing     Problem: MECHANICAL VENTILATION  Goal: Ability to express needs and understand communication  9/12/2021 1941 by Katy Haney RCP  Outcome: Ongoing     Problem: MECHANICAL VENTILATION  Goal: Mobility/activity is maintained at optimum level for patient  9/12/2021 1941 by Katy Haney RCP  Outcome: Ongoing     Problem: MECHANICAL VENTILATION  Goal: Tracheostomy will be managed safely  9/12/2021 1941 by Katy Haney RCP  Outcome: Ongoing     Problem: SKIN INTEGRITY  Goal: Skin integrity is maintained or improved  9/12/2021 1941 by Ramiro Haney RCP  Outcome: Ongoing

## 2021-09-12 NOTE — PLAN OF CARE
Problem: OXYGENATION/RESPIRATORY FUNCTION  Goal: Patient will maintain patent airway  9/12/2021 1113 by Cong Edwards RN  Outcome: Ongoing  9/12/2021 0501 by Connor Bingham RCP  Outcome: Ongoing  9/12/2021 0022 by Janak Madera RN  Outcome: Ongoing  9/12/2021 0021 by Janak Madera RN  Outcome: Ongoing  Goal: Patient will achieve/maintain normal respiratory rate/effort  Description: Respiratory rate and effort will be within normal limits for the patient  9/12/2021 1113 by Cong Edwards RN  Outcome: Ongoing  9/12/2021 0501 by Connor Bingham RCP  Outcome: Ongoing  9/12/2021 0022 by Janak Madera RN  Outcome: Ongoing  9/12/2021 0021 by Janak Madera RN  Outcome: Ongoing     Problem: MECHANICAL VENTILATION  Goal: Patient will maintain patent airway  9/12/2021 1113 by Cong Edwards RN  Outcome: Ongoing  9/12/2021 0501 by Connor Bingham RCP  Outcome: Ongoing  9/12/2021 0022 by Janak Madera RN  Outcome: Ongoing  9/12/2021 0021 by Janak Madera RN  Outcome: Ongoing  Goal: Oral health is maintained or improved  9/12/2021 1113 by Cong Edwards RN  Outcome: Ongoing  9/12/2021 0501 by Connor Bingham RCP  Outcome: Ongoing  9/12/2021 0022 by Janak Madera RN  Outcome: Ongoing  9/12/2021 0021 by Janak Madera RN  Outcome: Ongoing  Goal: Ability to express needs and understand communication  9/12/2021 1113 by Cong Edwards RN  Outcome: Ongoing  9/12/2021 0501 by Connor Bingham RCP  Outcome: Ongoing  9/12/2021 0022 by Janak Madera RN  Outcome: Ongoing  9/12/2021 0021 by Janak Madera RN  Outcome: Ongoing  Goal: Mobility/activity is maintained at optimum level for patient  9/12/2021 1113 by Cong Edwards RN  Outcome: Ongoing  9/12/2021 0501 by Connor Bingham RCP  Outcome: Ongoing  9/12/2021 0022 by Janak Madera RN  Outcome: Ongoing  9/12/2021 0021 by Janak Madera RN  Outcome: Ongoing  Goal: Tracheostomy will be managed safely  9/12/2021 1113 by Cong Edwards, RN  Outcome: Ongoing  9/12/2021 0501 by Chaz Osorio RCP  Outcome: Ongoing  9/12/2021 0022 by Amy Dugan RN  Outcome: Ongoing  9/12/2021 0021 by Amy Dugan RN  Outcome: Ongoing     Problem: SKIN INTEGRITY  Goal: Skin integrity is maintained or improved  9/12/2021 1113 by Nicole Amaya RN  Outcome: Ongoing  9/12/2021 0501 by Chaz Osorio RCP  Outcome: Ongoing  9/12/2021 0022 by Amy Dugan RN  Outcome: Ongoing  9/12/2021 0021 by Amy Dugan RN  Outcome: Ongoing     Problem: Confusion - Acute:  Goal: Absence of continued neurological deterioration signs and symptoms  Description: Absence of continued neurological deterioration signs and symptoms  9/12/2021 1113 by Nicole Amaya RN  Outcome: Ongoing  9/12/2021 0022 by Amy Dugan RN  Outcome: Ongoing  9/12/2021 0021 by Amy Dugan RN  Outcome: Ongoing  Goal: Mental status will be restored to baseline  Description: Mental status will be restored to baseline  9/12/2021 1113 by Nicole Amaya RN  Outcome: Ongoing  9/12/2021 0022 by Amy Dugan RN  Outcome: Ongoing  9/12/2021 0021 by Amy Dugan RN  Outcome: Ongoing     Problem: Discharge Planning:  Goal: Ability to perform activities of daily living will improve  Description: Ability to perform activities of daily living will improve  9/12/2021 1113 by Nicole Amaya RN  Outcome: Ongoing  9/12/2021 0022 by Amy Dugan RN  Outcome: Ongoing  9/12/2021 0021 by Amy Dugan RN  Outcome: Ongoing  Goal: Participates in care planning  Description: Participates in care planning  9/12/2021 1113 by Nicole Amaya RN  Outcome: Ongoing  9/12/2021 0022 by Amy Dugan RN  Outcome: Ongoing  9/12/2021 0021 by Amy Dugan RN  Outcome: Ongoing     Problem: Injury - Risk of, Physical Injury:  Goal: Absence of physical injury  Description: Absence of physical injury  9/12/2021 1113 by Nicole Amaya RN  Outcome: Ongoing  9/12/2021 0022 by Amy Dugan RN  Outcome: Ongoing  9/12/2021 0021 by Cali Anderson RN  Outcome: Ongoing  Goal: Will remain free from falls  Description: Will remain free from falls  9/12/2021 1113 by Maxx Terry RN  Outcome: Ongoing  9/12/2021 0022 by Cali Anderson RN  Outcome: Ongoing  9/12/2021 0021 by Cali Anderson RN  Outcome: Ongoing     Problem: Mood - Altered:  Goal: Mood stable  Description: Mood stable  9/12/2021 1113 by Maxx Terry RN  Outcome: Ongoing  9/12/2021 0022 by Cali Anderson RN  Outcome: Ongoing  9/12/2021 0021 by Cali Anderson RN  Outcome: Ongoing  Goal: Absence of abusive behavior  Description: Absence of abusive behavior  9/12/2021 1113 by Maxx Terry RN  Outcome: Ongoing  9/12/2021 0022 by Cali Anderson RN  Outcome: Ongoing  9/12/2021 0021 by Cali Anderson RN  Outcome: Ongoing  Goal: Verbalizations of feeling emotionally comfortable while being cared for will increase  Description: Verbalizations of feeling emotionally comfortable while being cared for will increase  9/12/2021 1113 by Maxx Terry RN  Outcome: Ongoing  9/12/2021 0022 by Cali Anderson RN  Outcome: Ongoing  9/12/2021 0021 by Cali Anderson RN  Outcome: Ongoing     Problem: Psychomotor Activity - Altered:  Goal: Absence of psychomotor disturbance signs and symptoms  Description: Absence of psychomotor disturbance signs and symptoms  9/12/2021 1113 by Maxx Terry RN  Outcome: Ongoing  9/12/2021 0022 by Cali Anderson RN  Outcome: Ongoing  9/12/2021 0021 by Cali Anderson RN  Outcome: Ongoing     Problem: Sensory Perception - Impaired:  Goal: Demonstrations of improved sensory functioning will increase  Description: Demonstrations of improved sensory functioning will increase  9/12/2021 1113 by Maxx Terry RN  Outcome: Ongoing  9/12/2021 0022 by Cali Anderson RN  Outcome: Ongoing  9/12/2021 0021 by Cali Anderson RN  Outcome: Ongoing  Goal: Decrease in sensory misperception frequency  Description: Decrease in sensory misperception frequency  9/12/2021 1113 by Nicole Amaya RN  Outcome: Ongoing  9/12/2021 0022 by Amy Dugan RN  Outcome: Ongoing  9/12/2021 0021 by Amy Dugan RN  Outcome: Ongoing  Goal: Able to refrain from responding to false sensory perceptions  Description: Able to refrain from responding to false sensory perceptions  9/12/2021 1113 by Nicole Amaya, RN  Outcome: Ongoing  9/12/2021 0022 by Amy Dugan RN  Outcome: Ongoing  9/12/2021 0021 by Amy Dugan RN  Outcome: Ongoing  Goal: Demonstrates accurate environmental perceptions  Description: Demonstrates accurate environmental perceptions  9/12/2021 1113 by Nicole Amaya RN  Outcome: Ongoing  9/12/2021 0022 by Amy Dugan RN  Outcome: Ongoing  9/12/2021 0021 by Amy Dugan RN  Outcome: Ongoing  Goal: Able to distinguish between reality-based and nonreality-based thinking  Description: Able to distinguish between reality-based and nonreality-based thinking  9/12/2021 1113 by Nicole Amaya RN  Outcome: Ongoing  9/12/2021 0022 by Amy Dugan RN  Outcome: Ongoing  9/12/2021 0021 by Amy Dugan RN  Outcome: Ongoing  Goal: Able to interrupt nonreality-based thinking  Description: Able to interrupt nonreality-based thinking  9/12/2021 1113 by Nicole Amaya, SABINA  Outcome: Ongoing  9/12/2021 0022 by Amy Dugan RN  Outcome: Ongoing  9/12/2021 0021 by Amy Dugan RN  Outcome: Ongoing     Problem: Sleep Pattern Disturbance:  Goal: Appears well-rested  Description: Appears well-rested  9/12/2021 1113 by Nicole Amaya RN  Outcome: Ongoing  9/12/2021 0022 by Amy Dugan RN  Outcome: Ongoing  9/12/2021 0021 by Amy Dugan RN  Outcome: Ongoing     Problem: Skin Integrity:  Goal: Will show no infection signs and symptoms  Description: Will show no infection signs and symptoms  9/12/2021 1113 by Nicole Amaya RN  Outcome: Ongoing  9/12/2021 0022 by Cherylyn Senters, RN  Outcome: Ongoing  9/12/2021 0021 by Kristian Ladd RN  Outcome: Ongoing  Goal: Absence of new skin breakdown  Description: Absence of new skin breakdown  9/12/2021 1113 by Mica Smyth RN  Outcome: Ongoing  9/12/2021 0022 by Kristian Ladd RN  Outcome: Ongoing  9/12/2021 0021 by Kristian Ladd RN  Outcome: Ongoing     Problem: Falls - Risk of:  Goal: Absence of physical injury  Description: Absence of physical injury  9/12/2021 1113 by Mica Smyth RN  Outcome: Ongoing  9/12/2021 0022 by Kristian Ladd RN  Outcome: Ongoing  9/12/2021 0021 by Kristian Ladd RN  Outcome: Ongoing  Goal: Will remain free from falls  Description: Will remain free from falls  9/12/2021 1113 by Mica Smyth RN  Outcome: Ongoing  9/12/2021 0022 by Kristian Ladd RN  Outcome: Ongoing  9/12/2021 0021 by Kristian Ladd RN  Outcome: Ongoing     Problem: Nutrition  Goal: Optimal nutrition therapy  9/12/2021 1113 by Mica Smyth RN  Outcome: Ongoing  9/12/2021 0022 by Kristian Ladd RN  Outcome: Ongoing  9/12/2021 0021 by Kristian Ladd RN  Outcome: Ongoing

## 2021-09-12 NOTE — PLAN OF CARE
Problem: OXYGENATION/RESPIRATORY FUNCTION  Goal: Patient will maintain patent airway  9/12/2021 0022 by Bird Almanza RN  Outcome: Ongoing  9/12/2021 0021 by Bird Almanza RN  Outcome: Ongoing  9/11/2021 1329 by Young George RN  Outcome: Ongoing  Goal: Patient will achieve/maintain normal respiratory rate/effort  Description: Respiratory rate and effort will be within normal limits for the patient  9/12/2021 0022 by Bird Almanza RN  Outcome: Ongoing  9/12/2021 0021 by Bird Almanza RN  Outcome: Ongoing  9/11/2021 1329 by Young George RN  Outcome: Ongoing     Problem: MECHANICAL VENTILATION  Goal: Patient will maintain patent airway  9/12/2021 0022 by Bird Almanza RN  Outcome: Ongoing  9/12/2021 0021 by Bird Almanza RN  Outcome: Ongoing  9/11/2021 1329 by Young George RN  Outcome: Ongoing  Goal: Oral health is maintained or improved  9/12/2021 0022 by Bird Almanza RN  Outcome: Ongoing  9/12/2021 0021 by Bird Almanza RN  Outcome: Ongoing  9/11/2021 1329 by Young George RN  Outcome: Ongoing  Goal: Ability to express needs and understand communication  9/12/2021 0022 by Bird Almanza RN  Outcome: Ongoing  9/12/2021 0021 by Bird Almanza RN  Outcome: Ongoing  9/11/2021 1329 by Young George RN  Outcome: Ongoing  Goal: Mobility/activity is maintained at optimum level for patient  9/12/2021 0022 by Bird Almanza RN  Outcome: Ongoing  9/12/2021 0021 by Bird Almanza RN  Outcome: Ongoing  9/11/2021 1329 by Young George RN  Outcome: Ongoing  Goal: Tracheostomy will be managed safely  9/12/2021 0022 by Bird Almanza RN  Outcome: Ongoing  9/12/2021 0021 by Bird Almanza RN  Outcome: Ongoing  9/11/2021 1329 by Young George RN  Outcome: Ongoing     Problem: SKIN INTEGRITY  Goal: Skin integrity is maintained or improved  9/12/2021 0022 by Bird Almanza RN  Outcome: Ongoing  9/12/2021 0021 by Bird Almanza RN  Outcome: Ongoing  9/11/2021 1329 by Tessy Perez Asha Murillo RN  Outcome: Ongoing     Problem: Confusion - Acute:  Goal: Absence of continued neurological deterioration signs and symptoms  Description: Absence of continued neurological deterioration signs and symptoms  9/12/2021 0022 by Gordon Mcghee RN  Outcome: Ongoing  9/12/2021 0021 by Gordon Mcghee RN  Outcome: Ongoing  9/11/2021 1329 by Nathalie Uriarte RN  Outcome: Ongoing  Goal: Mental status will be restored to baseline  Description: Mental status will be restored to baseline  9/12/2021 0022 by Gordon Mcghee RN  Outcome: Ongoing  9/12/2021 0021 by Gordon Mcghee RN  Outcome: Ongoing  9/11/2021 1329 by Nathalie Uriarte RN  Outcome: Ongoing     Problem: Discharge Planning:  Goal: Ability to perform activities of daily living will improve  Description: Ability to perform activities of daily living will improve  9/12/2021 0022 by Gordon Mchgee RN  Outcome: Ongoing  9/12/2021 0021 by Gordon Mcghee RN  Outcome: Ongoing  9/11/2021 1329 by Nathalie Uriarte RN  Outcome: Ongoing  Goal: Participates in care planning  Description: Participates in care planning  9/12/2021 0022 by Gordon Mcghee RN  Outcome: Ongoing  9/12/2021 0021 by Gordon Mcghee RN  Outcome: Ongoing  9/11/2021 1329 by Nathalie Uriarte RN  Outcome: Ongoing     Problem: Injury - Risk of, Physical Injury:  Goal: Absence of physical injury  Description: Absence of physical injury  9/12/2021 0022 by Gordon Mcghee RN  Outcome: Ongoing  9/12/2021 0021 by Gordon Mcghee RN  Outcome: Ongoing  9/11/2021 1329 by Nathalie Uriarte RN  Outcome: Ongoing  Goal: Will remain free from falls  Description: Will remain free from falls  9/12/2021 0022 by Gordon Mcghee RN  Outcome: Ongoing  9/12/2021 0021 by Gordon Mcghee RN  Outcome: Ongoing  9/11/2021 1329 by Nathalie Uriarte RN  Outcome: Ongoing     Problem: Mood - Altered:  Goal: Mood stable  Description: Mood stable  9/12/2021 0022 by Gordon Mcghee RN  Outcome: Ongoing  9/12/2021 0021 by Cookie Hernandez RN  Outcome: Ongoing  9/11/2021 1329 by Yolanda Mcguire RN  Outcome: Ongoing  Goal: Absence of abusive behavior  Description: Absence of abusive behavior  9/12/2021 0022 by Cookie Hernandez RN  Outcome: Ongoing  9/12/2021 0021 by Cookie Hernandez RN  Outcome: Ongoing  9/11/2021 1329 by Yolanda Mcguire RN  Outcome: Ongoing  Goal: Verbalizations of feeling emotionally comfortable while being cared for will increase  Description: Verbalizations of feeling emotionally comfortable while being cared for will increase  9/12/2021 0022 by Cookie Hernandez RN  Outcome: Ongoing  9/12/2021 0021 by Cookie Hernandez RN  Outcome: Ongoing  9/11/2021 1329 by Yolanda Mcguire RN  Outcome: Ongoing     Problem: Psychomotor Activity - Altered:  Goal: Absence of psychomotor disturbance signs and symptoms  Description: Absence of psychomotor disturbance signs and symptoms  9/12/2021 0022 by Cookie Hernandez RN  Outcome: Ongoing  9/12/2021 0021 by Cookie Hernandez RN  Outcome: Ongoing  9/11/2021 1329 by Yolanda Mcguire RN  Outcome: Ongoing     Problem: Sensory Perception - Impaired:  Goal: Demonstrations of improved sensory functioning will increase  Description: Demonstrations of improved sensory functioning will increase  9/12/2021 0022 by Cookie Hernandez RN  Outcome: Ongoing  9/12/2021 0021 by Cookie Hernandez RN  Outcome: Ongoing  9/11/2021 1329 by Yolanda Mcguire RN  Outcome: Ongoing  Goal: Decrease in sensory misperception frequency  Description: Decrease in sensory misperception frequency  9/12/2021 0022 by Cookie Hernandez RN  Outcome: Ongoing  9/12/2021 0021 by Cookie Hernandez RN  Outcome: Ongoing  9/11/2021 1329 by Yolanda Mcguire RN  Outcome: Ongoing  Goal: Able to refrain from responding to false sensory perceptions  Description: Able to refrain from responding to false sensory perceptions  9/12/2021 0022 by Cookie Hernandez RN  Outcome: Ongoing  9/12/2021 0021 by Cookie Hernandez RN  Outcome: Ongoing  9/11/2021 1329 by Mica Smyth RN  Outcome: Ongoing  Goal: Demonstrates accurate environmental perceptions  Description: Demonstrates accurate environmental perceptions  9/12/2021 0022 by Kristian Ladd RN  Outcome: Ongoing  9/12/2021 0021 by Kristian Ladd RN  Outcome: Ongoing  9/11/2021 1329 by Mica Smyth RN  Outcome: Ongoing  Goal: Able to distinguish between reality-based and nonreality-based thinking  Description: Able to distinguish between reality-based and nonreality-based thinking  9/12/2021 0022 by Kristian Ladd RN  Outcome: Ongoing  9/12/2021 0021 by Kristian Ladd RN  Outcome: Ongoing  9/11/2021 1329 by Mica Smyth RN  Outcome: Ongoing  Goal: Able to interrupt nonreality-based thinking  Description: Able to interrupt nonreality-based thinking  9/12/2021 0022 by Kristian Ladd RN  Outcome: Ongoing  9/12/2021 0021 by Kristian Ladd RN  Outcome: Ongoing  9/11/2021 1329 by Mica Smyth RN  Outcome: Ongoing     Problem: Sleep Pattern Disturbance:  Goal: Appears well-rested  Description: Appears well-rested  9/12/2021 0022 by Kristian Ladd RN  Outcome: Ongoing  9/12/2021 0021 by Kristian Ladd RN  Outcome: Ongoing  9/11/2021 1329 by Mica Smyth RN  Outcome: Ongoing     Problem: Skin Integrity:  Goal: Will show no infection signs and symptoms  Description: Will show no infection signs and symptoms  9/12/2021 0022 by Kristian Ladd RN  Outcome: Ongoing  9/12/2021 0021 by Kristian Ladd RN  Outcome: Ongoing  9/11/2021 1329 by Mica Smyth RN  Outcome: Ongoing  Goal: Absence of new skin breakdown  Description: Absence of new skin breakdown  9/12/2021 0022 by Kristian Ladd RN  Outcome: Ongoing  9/12/2021 0021 by Kristian Ladd RN  Outcome: Ongoing  9/11/2021 1329 by Mica Smyth RN  Outcome: Ongoing     Problem: Falls - Risk of:  Goal: Absence of physical injury  Description: Absence of physical injury  9/12/2021 0022 by Kristian Ladd RN  Outcome: Ongoing  9/12/2021 0021 by Edilberto Villa RN  Outcome: Ongoing  9/11/2021 1329 by Candice Christensen RN  Outcome: Ongoing  Goal: Will remain free from falls  Description: Will remain free from falls  9/12/2021 0022 by Edilberto Villa RN  Outcome: Ongoing  9/12/2021 0021 by Edilberto Villa RN  Outcome: Ongoing  9/11/2021 1329 by Candice Christensen RN  Outcome: Ongoing     Problem: Nutrition  Goal: Optimal nutrition therapy  9/12/2021 0022 by Edilberto Villa RN  Outcome: Ongoing  9/12/2021 0021 by Edilberto Villa RN  Outcome: Ongoing  9/11/2021 1329 by Candice Christensen RN  Outcome: Ongoing

## 2021-09-12 NOTE — PLAN OF CARE
Problem: OXYGENATION/RESPIRATORY FUNCTION  Goal: Patient will maintain patent airway  9/12/2021 0501 by Dulce Rapp RCP  Outcome: Ongoing     Problem: OXYGENATION/RESPIRATORY FUNCTION  Goal: Patient will achieve/maintain normal respiratory rate/effort  Description: Respiratory rate and effort will be within normal limits for the patient  9/12/2021 0501 by Dulce Rapp RCP  Outcome: Ongoing     Problem: MECHANICAL VENTILATION  Goal: Patient will maintain patent airway  9/12/2021 0501 by Dulce Rapp RCP  Outcome: Ongoing     Problem: MECHANICAL VENTILATION  Goal: Oral health is maintained or improved  9/12/2021 0501 by Dulce Rapp RCP  Outcome: Ongoing     Problem: MECHANICAL VENTILATION  Goal: Ability to express needs and understand communication  9/12/2021 0501 by Dulce Rapp RCP  Outcome: Ongoing     Problem: MECHANICAL VENTILATION  Goal: Mobility/activity is maintained at optimum level for patient  9/12/2021 0501 by Dulce Rapp RCP  Outcome: Ongoing     Problem: MECHANICAL VENTILATION  Goal: Tracheostomy will be managed safely  9/12/2021 0501 by Dulce Rapp RCP  Outcome: Ongoing     Problem: SKIN INTEGRITY  Goal: Skin integrity is maintained or improved  9/12/2021 0501 by Dulce Rapp RCP  Outcome: Ongoing
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
## 2021-09-12 NOTE — CARE COORDINATION
Transitional Planning:    Received message from Marcela with [employer] stating that she is still waiting for auth for precert and will check back with CM tomorrow.

## 2021-09-13 ENCOUNTER — APPOINTMENT (OUTPATIENT)
Dept: CT IMAGING | Age: 36
DRG: 003 | End: 2021-09-13
Payer: COMMERCIAL

## 2021-09-13 LAB
ABSOLUTE EOS #: 0.18 K/UL (ref 0–0.44)
ABSOLUTE IMMATURE GRANULOCYTE: 0.03 K/UL (ref 0–0.3)
ABSOLUTE LYMPH #: 2.32 K/UL (ref 1.1–3.7)
ABSOLUTE MONO #: 0.72 K/UL (ref 0.1–1.2)
ANION GAP SERPL CALCULATED.3IONS-SCNC: 9 MMOL/L (ref 9–17)
BASOPHILS # BLD: 1 % (ref 0–2)
BASOPHILS ABSOLUTE: 0.09 K/UL (ref 0–0.2)
BUN BLDV-MCNC: 28 MG/DL (ref 6–20)
BUN/CREAT BLD: ABNORMAL (ref 9–20)
CALCIUM SERPL-MCNC: 9.3 MG/DL (ref 8.6–10.4)
CHLORIDE BLD-SCNC: 102 MMOL/L (ref 98–107)
CO2: 30 MMOL/L (ref 20–31)
CREAT SERPL-MCNC: 0.47 MG/DL (ref 0.5–0.9)
DIFFERENTIAL TYPE: ABNORMAL
EOSINOPHILS RELATIVE PERCENT: 2 % (ref 1–4)
GFR AFRICAN AMERICAN: >60 ML/MIN
GFR NON-AFRICAN AMERICAN: >60 ML/MIN
GFR SERPL CREATININE-BSD FRML MDRD: ABNORMAL ML/MIN/{1.73_M2}
GFR SERPL CREATININE-BSD FRML MDRD: ABNORMAL ML/MIN/{1.73_M2}
GLUCOSE BLD-MCNC: 113 MG/DL (ref 70–99)
HCT VFR BLD CALC: 32.9 % (ref 36.3–47.1)
HEMOGLOBIN: 10.2 G/DL (ref 11.9–15.1)
IMMATURE GRANULOCYTES: 0 %
LYMPHOCYTES # BLD: 27 % (ref 24–43)
MCH RBC QN AUTO: 27.6 PG (ref 25.2–33.5)
MCHC RBC AUTO-ENTMCNC: 31 G/DL (ref 28.4–34.8)
MCV RBC AUTO: 88.9 FL (ref 82.6–102.9)
MONOCYTES # BLD: 8 % (ref 3–12)
NRBC AUTOMATED: 0 PER 100 WBC
PDW BLD-RTO: 12.6 % (ref 11.8–14.4)
PLATELET # BLD: 756 K/UL (ref 138–453)
PLATELET ESTIMATE: ABNORMAL
PMV BLD AUTO: 8.8 FL (ref 8.1–13.5)
POTASSIUM SERPL-SCNC: 4 MMOL/L (ref 3.7–5.3)
RBC # BLD: 3.7 M/UL (ref 3.95–5.11)
RBC # BLD: ABNORMAL 10*6/UL
SEG NEUTROPHILS: 61 % (ref 36–65)
SEGMENTED NEUTROPHILS ABSOLUTE COUNT: 5.28 K/UL (ref 1.5–8.1)
SODIUM BLD-SCNC: 141 MMOL/L (ref 135–144)
WBC # BLD: 8.6 K/UL (ref 3.5–11.3)
WBC # BLD: ABNORMAL 10*3/UL

## 2021-09-13 PROCEDURE — 6370000000 HC RX 637 (ALT 250 FOR IP): Performed by: STUDENT IN AN ORGANIZED HEALTH CARE EDUCATION/TRAINING PROGRAM

## 2021-09-13 PROCEDURE — 6360000002 HC RX W HCPCS: Performed by: STUDENT IN AN ORGANIZED HEALTH CARE EDUCATION/TRAINING PROGRAM

## 2021-09-13 PROCEDURE — 2700000000 HC OXYGEN THERAPY PER DAY

## 2021-09-13 PROCEDURE — 36415 COLL VENOUS BLD VENIPUNCTURE: CPT

## 2021-09-13 PROCEDURE — 80048 BASIC METABOLIC PNL TOTAL CA: CPT

## 2021-09-13 PROCEDURE — 70450 CT HEAD/BRAIN W/O DYE: CPT

## 2021-09-13 PROCEDURE — 97164 PT RE-EVAL EST PLAN CARE: CPT

## 2021-09-13 PROCEDURE — 94761 N-INVAS EAR/PLS OXIMETRY MLT: CPT

## 2021-09-13 PROCEDURE — 97112 NEUROMUSCULAR REEDUCATION: CPT

## 2021-09-13 PROCEDURE — 2000000000 HC ICU R&B

## 2021-09-13 PROCEDURE — 2580000003 HC RX 258: Performed by: STUDENT IN AN ORGANIZED HEALTH CARE EDUCATION/TRAINING PROGRAM

## 2021-09-13 PROCEDURE — 97530 THERAPEUTIC ACTIVITIES: CPT

## 2021-09-13 PROCEDURE — 85025 COMPLETE CBC W/AUTO DIFF WBC: CPT

## 2021-09-13 RX ORDER — ACETAMINOPHEN 500 MG
1000 TABLET ORAL EVERY 8 HOURS PRN
Status: DISCONTINUED | OUTPATIENT
Start: 2021-09-13 | End: 2021-09-22 | Stop reason: HOSPADM

## 2021-09-13 RX ADMIN — SODIUM CHLORIDE, PRESERVATIVE FREE 10 ML: 5 INJECTION INTRAVENOUS at 08:58

## 2021-09-13 RX ADMIN — ACETAMINOPHEN 1000 MG: 500 TABLET ORAL at 05:03

## 2021-09-13 RX ADMIN — POLYETHYLENE GLYCOL 3350 17 G: 17 POWDER, FOR SOLUTION ORAL at 09:00

## 2021-09-13 RX ADMIN — FAMOTIDINE 20 MG: 20 TABLET, FILM COATED ORAL at 08:56

## 2021-09-13 RX ADMIN — CHLORHEXIDINE GLUCONATE 0.12% ORAL RINSE 15 ML: 1.2 LIQUID ORAL at 21:06

## 2021-09-13 RX ADMIN — PROPRANOLOL HYDROCHLORIDE 10 MG: 10 TABLET ORAL at 21:06

## 2021-09-13 RX ADMIN — ENOXAPARIN SODIUM 30 MG: 30 INJECTION SUBCUTANEOUS at 08:56

## 2021-09-13 RX ADMIN — PROPRANOLOL HYDROCHLORIDE 10 MG: 10 TABLET ORAL at 15:06

## 2021-09-13 RX ADMIN — CHLORHEXIDINE GLUCONATE 0.12% ORAL RINSE 15 ML: 1.2 LIQUID ORAL at 09:00

## 2021-09-13 RX ADMIN — AMANTADINE HYDROCHLORIDE 100 MG: 50 SOLUTION ORAL at 08:56

## 2021-09-13 RX ADMIN — PROPRANOLOL HYDROCHLORIDE 10 MG: 10 TABLET ORAL at 08:57

## 2021-09-13 RX ADMIN — AMANTADINE HYDROCHLORIDE 100 MG: 50 SOLUTION ORAL at 15:06

## 2021-09-13 RX ADMIN — MAGNESIUM GLUCONATE 500 MG ORAL TABLET 400 MG: 500 TABLET ORAL at 08:57

## 2021-09-13 RX ADMIN — ENOXAPARIN SODIUM 30 MG: 30 INJECTION SUBCUTANEOUS at 21:06

## 2021-09-13 ASSESSMENT — PULMONARY FUNCTION TESTS
PIF_VALUE: 13
PIF_VALUE: 18
PIF_VALUE: 13
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
## 2021-09-13 NOTE — PROGRESS NOTES
Emotional support offered to patient.        Palliative Care Coordinator  DONNA [facility] SABINA Estevez, JENY VILLEGAS [facility] Office: [address] [place] Office: [phone]    For Symptom Management Clinic scheduling please call [phone]

## 2021-09-13 NOTE — PROGRESS NOTES
Comprehensive Nutrition Assessment    Type and Reason for Visit:  Reassess    Nutrition Recommendations/Plan: Continue Immune Enhancing Formula at 45 mL/hr. Suggest 120 mL free water flush every 4 hrs to provide at total of 1710 mL water/day. Will continue to monitor TF tolerance/adequacy. Nutrition Assessment:  Chart reviewed. Pt remains NPO at this time. Immune Enhancing TF at 45 mL/hr and tolerating well. Last BM noted: 9/12/21. Meds/labs reviewed. Malnutrition Assessment:  Malnutrition Status:  Insufficient data    Context:  Acute Illness     Findings of the 6 clinical characteristics of malnutrition:  Energy Intake:  Mild decrease in energy intake -improved with TF  Weight Loss:  Unable to assess     Body Fat Loss:  Unable to assess     Muscle Mass Loss:  Unable to assess    Fluid Accumulation:  No significant fluid accumulation     Strength:  Not Performed    Estimated Daily Nutrient Needs:  Energy (kcal):  7724-4132 kcal/day; Weight Used for Energy Requirements:  Current     Protein (g):  85 g pro/day; Weight Used for Protein Requirements:  Current (1.5)        Fluid (ml/day):  1700 mL/day; Method Used for Fluid Requirements:  ml/Kg (30)      Nutrition Related Findings:  Meds/labs reviewed.  Last BM noted: 9/12/21      Wounds:  Multiple, Surgical Incision       Current Nutrition Therapies:    Diet NPO  ADULT TUBE FEEDING; PEG; Immune Enhancing; Continuous; 25; Yes; 10; Q 4 hours; 45; 30; Q 4 hours  Current Tube Feeding (TF) Orders:  · Feeding Route: PEG  · Formula: Immune Enhancing  · Schedule: Continuous at 45 mL/hr  · Water Flushes: 30 mL every 4 hr  · Current TF & Flush Orders Provides: 1620 kcal, 101 g pro, 990 mL free water/day  · Goal TF & Flush Orders Provides: 1620 kcal, 101 g pro, 990 mL free water/day    Additional Calorie Sources:   none    Anthropometric Measures:  · Height: 5' 5\" (165.1 cm)  · Current Body Weight: 125 lb 10.6 oz (57 kg)   · Admission Body Weight: 140 lb (63.5 kg)

## 2021-09-13 NOTE — PROGRESS NOTES
09/13/21 1701   Surgical Airway (trach) Shiley Cuffed   Placement Date: 08/29/21   Placed By: In surgery  Surgical Airway Type: Tracheostomy  Brand: Sonia  Style: Cuffed  Size (mm): 8   Status Secured   Site Assessment Clean;Dry   Site Care Cleansed;Dressing applied   Inner Cannula Care Changed/new   Ties Assessment Dry;Secure   Trach care completed.
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
## 2021-09-13 NOTE — PLAN OF CARE
Problem: SKIN INTEGRITY  Goal: Skin integrity is maintained or improved  Outcome: Ongoing     Problem: Injury - Risk of, Physical Injury:  Goal: Absence of physical injury  Description: Absence of physical injury  Outcome: Ongoing     Problem: Injury - Risk of, Physical Injury:  Goal: Will remain free from falls  Description: Will remain free from falls  Outcome: Ongoing     Problem: Skin Integrity:  Goal: Will show no infection signs and symptoms  Description: Will show no infection signs and symptoms  Outcome: Ongoing     Problem: Skin Integrity:  Goal: Absence of new skin breakdown  Description: Absence of new skin breakdown  Outcome: Ongoing     Problem: Falls - Risk of:  Goal: Absence of physical injury  Description: Absence of physical injury  Outcome: Ongoing     Problem: Falls - Risk of:  Goal: Will remain free from falls  Description: Will remain free from falls  Outcome: Ongoing   Round on patient hourly. Reposition patient every 2 hours. Maintain clean and dry skin.     Bob Sexton RN

## 2021-09-13 NOTE — PLAN OF CARE
Problem: OXYGENATION/RESPIRATORY FUNCTION  Goal: Patient will maintain patent airway  9/13/2021 0146 by Lefty Grullon RN  Outcome: Ongoing  9/12/2021 1941 by Bakari Haney RCP  Outcome: Ongoing  Goal: Patient will achieve/maintain normal respiratory rate/effort  Description: Respiratory rate and effort will be within normal limits for the patient  9/13/2021 0146 by Lefty Grullon RN  Outcome: Ongoing  9/12/2021 1941 by Bakari Haney RCP  Outcome: Ongoing     Problem: MECHANICAL VENTILATION  Goal: Patient will maintain patent airway  9/13/2021 0146 by Lefty Grullon RN  Outcome: Ongoing  9/12/2021 1941 by Bakari Haney RCP  Outcome: Ongoing  Goal: Oral health is maintained or improved  9/13/2021 0146 by Lefty Grullon RN  Outcome: Ongoing  9/12/2021 1941 by Bakari Haney RCP  Outcome: Ongoing  Goal: Ability to express needs and understand communication  9/13/2021 0146 by Lefty Grullon RN  Outcome: Ongoing  9/12/2021 1941 by Bakari Haney RCP  Outcome: Ongoing  Goal: Mobility/activity is maintained at optimum level for patient  9/13/2021 0146 by Lefty Grullon RN  Outcome: Ongoing  9/12/2021 1941 by Bakari Haney RCP  Outcome: Ongoing  Goal: Tracheostomy will be managed safely  9/13/2021 0146 by Lefty Grullon RN  Outcome: Ongoing  9/12/2021 1941 by Bakari Haney RCP  Outcome: Ongoing     Problem: SKIN INTEGRITY  Goal: Skin integrity is maintained or improved  9/13/2021 0146 by Lefty Grullon RN  Outcome: Ongoing  9/12/2021 1941 by Bakari Haney RCP  Outcome: Ongoing     Problem: Confusion - Acute:  Goal: Absence of continued neurological deterioration signs and symptoms  Description: Absence of continued neurological deterioration signs and symptoms  Outcome: Ongoing  Goal: Mental status will be restored to baseline  Description: Mental status will be restored to baseline  Outcome: Ongoing     Problem: Discharge Planning:  Goal: Ability to perform activities of daily living will improve  Description: Ability to perform activities of daily living will improve  Outcome: Ongoing  Goal: Participates in care planning  Description: Participates in care planning  Outcome: Ongoing     Problem: Injury - Risk of, Physical Injury:  Goal: Absence of physical injury  Description: Absence of physical injury  Outcome: Ongoing  Goal: Will remain free from falls  Description: Will remain free from falls  Outcome: Ongoing     Problem: Mood - Altered:  Goal: Mood stable  Description: Mood stable  Outcome: Ongoing  Goal: Absence of abusive behavior  Description: Absence of abusive behavior  Outcome: Ongoing  Goal: Verbalizations of feeling emotionally comfortable while being cared for will increase  Description: Verbalizations of feeling emotionally comfortable while being cared for will increase  Outcome: Ongoing     Problem: Psychomotor Activity - Altered:  Goal: Absence of psychomotor disturbance signs and symptoms  Description: Absence of psychomotor disturbance signs and symptoms  Outcome: Ongoing     Problem: Sensory Perception - Impaired:  Goal: Demonstrations of improved sensory functioning will increase  Description: Demonstrations of improved sensory functioning will increase  Outcome: Ongoing  Goal: Decrease in sensory misperception frequency  Description: Decrease in sensory misperception frequency  Outcome: Ongoing  Goal: Able to refrain from responding to false sensory perceptions  Description: Able to refrain from responding to false sensory perceptions  Outcome: Ongoing  Goal: Demonstrates accurate environmental perceptions  Description: Demonstrates accurate environmental perceptions  Outcome: Ongoing  Goal: Able to distinguish between reality-based and nonreality-based thinking  Description: Able to distinguish between reality-based and nonreality-based thinking  Outcome: Ongoing  Goal: Able to interrupt nonreality-based thinking  Description: Able to interrupt nonreality-based thinking  Outcome: Ongoing     Problem: Sleep Pattern Disturbance:  Goal: Appears well-rested  Description: Appears well-rested  Outcome: Ongoing     Problem: Skin Integrity:  Goal: Will show no infection signs and symptoms  Description: Will show no infection signs and symptoms  Outcome: Ongoing  Goal: Absence of new skin breakdown  Description: Absence of new skin breakdown  Outcome: Ongoing     Problem: Falls - Risk of:  Goal: Absence of physical injury  Description: Absence of physical injury  Outcome: Ongoing  Goal: Will remain free from falls  Description: Will remain free from falls  Outcome: Ongoing     Problem: Nutrition  Goal: Optimal nutrition therapy  Outcome: Ongoing
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
## 2021-09-13 NOTE — PROGRESS NOTES
09/13/21 0810   Oxygen Therapy/Pulse Ox   O2 Therapy Oxygen humidified   $Oxygen $Daily Charge   O2 Device Trach mask   O2 Flow Rate (L/min) 5 L/min   FiO2  28 %   Resp 16   SpO2 100 %   Pt placed on CAT as charted, pt tolerating at this time.

## 2021-09-14 PROCEDURE — 94003 VENT MGMT INPAT SUBQ DAY: CPT

## 2021-09-14 PROCEDURE — 94761 N-INVAS EAR/PLS OXIMETRY MLT: CPT

## 2021-09-14 PROCEDURE — 6370000000 HC RX 637 (ALT 250 FOR IP): Performed by: STUDENT IN AN ORGANIZED HEALTH CARE EDUCATION/TRAINING PROGRAM

## 2021-09-14 PROCEDURE — 6360000002 HC RX W HCPCS: Performed by: STUDENT IN AN ORGANIZED HEALTH CARE EDUCATION/TRAINING PROGRAM

## 2021-09-14 PROCEDURE — 97110 THERAPEUTIC EXERCISES: CPT

## 2021-09-14 PROCEDURE — 2700000000 HC OXYGEN THERAPY PER DAY

## 2021-09-14 PROCEDURE — 2000000000 HC ICU R&B

## 2021-09-14 PROCEDURE — 0B21XFZ CHANGE TRACHEOSTOMY DEVICE IN TRACHEA, EXTERNAL APPROACH: ICD-10-PCS | Performed by: SURGERY

## 2021-09-14 PROCEDURE — 97530 THERAPEUTIC ACTIVITIES: CPT

## 2021-09-14 RX ADMIN — ENOXAPARIN SODIUM 30 MG: 30 INJECTION SUBCUTANEOUS at 21:11

## 2021-09-14 RX ADMIN — CHLORHEXIDINE GLUCONATE 0.12% ORAL RINSE 15 ML: 1.2 LIQUID ORAL at 21:11

## 2021-09-14 RX ADMIN — PROPRANOLOL HYDROCHLORIDE 10 MG: 10 TABLET ORAL at 09:57

## 2021-09-14 RX ADMIN — ENOXAPARIN SODIUM 30 MG: 30 INJECTION SUBCUTANEOUS at 09:56

## 2021-09-14 RX ADMIN — CHLORHEXIDINE GLUCONATE 0.12% ORAL RINSE 15 ML: 1.2 LIQUID ORAL at 09:58

## 2021-09-14 RX ADMIN — AMANTADINE HYDROCHLORIDE 100 MG: 50 SOLUTION ORAL at 09:56

## 2021-09-14 RX ADMIN — PROPRANOLOL HYDROCHLORIDE 10 MG: 10 TABLET ORAL at 21:11

## 2021-09-14 RX ADMIN — PROPRANOLOL HYDROCHLORIDE 10 MG: 10 TABLET ORAL at 13:47

## 2021-09-14 RX ADMIN — AMANTADINE HYDROCHLORIDE 100 MG: 50 SOLUTION ORAL at 13:47

## 2021-09-14 ASSESSMENT — PULMONARY FUNCTION TESTS
PIF_VALUE: 15
PIF_VALUE: 17
PIF_VALUE: 15
PIF_VALUE: 15

## 2021-09-14 NOTE — PLAN OF CARE
Problem: OXYGENATION/RESPIRATORY FUNCTION  Goal: Patient will maintain patent airway  Outcome: Ongoing  Goal: Patient will achieve/maintain normal respiratory rate/effort  Description: Respiratory rate and effort will be within normal limits for the patient  Outcome: Ongoing     Problem: MECHANICAL VENTILATION  Goal: Patient will maintain patent airway  Outcome: Ongoing  Goal: Oral health is maintained or improved  Outcome: Ongoing  Goal: Ability to express needs and understand communication  Outcome: Ongoing  Goal: Mobility/activity is maintained at optimum level for patient  Outcome: Ongoing  Goal: Tracheostomy will be managed safely  Outcome: Ongoing     Problem: SKIN INTEGRITY  Goal: Skin integrity is maintained or improved  9/14/2021 0046 by Lacy Cruz RN  Outcome: Ongoing  9/13/2021 1953 by Merline Cinnamon, RN  Outcome: Ongoing     Problem: Confusion - Acute:  Goal: Absence of continued neurological deterioration signs and symptoms  Description: Absence of continued neurological deterioration signs and symptoms  Outcome: Ongoing  Goal: Mental status will be restored to baseline  Description: Mental status will be restored to baseline  Outcome: Ongoing     Problem: Discharge Planning:  Goal: Ability to perform activities of daily living will improve  Description: Ability to perform activities of daily living will improve  Outcome: Ongoing  Goal: Participates in care planning  Description: Participates in care planning  Outcome: Ongoing     Problem: Injury - Risk of, Physical Injury:  Goal: Absence of physical injury  Description: Absence of physical injury  9/14/2021 0046 by Lacy Cruz RN  Outcome: Ongoing  9/13/2021 1953 by Merline Cinnamon, RN  Outcome: Ongoing  Goal: Will remain free from falls  Description: Will remain free from falls  9/14/2021 0046 by Lacy Cruz RN  Outcome: Ongoing  9/13/2021 1953 by Merline Cinnamon, RN  Outcome: Ongoing     Problem: Mood - Altered:  Goal: Mood 5345 by Kristian Ladd RN  Outcome: Ongoing  9/13/2021 1953 by Joaquin Mann RN  Outcome: Ongoing     Problem: Falls - Risk of:  Goal: Absence of physical injury  Description: Absence of physical injury  9/14/2021 0046 by Kristian Ladd RN  Outcome: Ongoing  9/13/2021 1953 by Joaquin Mann RN  Outcome: Ongoing  Goal: Will remain free from falls  Description: Will remain free from falls  9/14/2021 0046 by Kristian Ladd RN  Outcome: Ongoing  9/13/2021 1953 by Joaquin Mann RN  Outcome: Ongoing     Problem: Nutrition  Goal: Optimal nutrition therapy  Outcome: Ongoing
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
## 2021-09-14 NOTE — CARE COORDINATION
Transitional Planning:    Called [facility] to check on status of precert, spoke with Sydni Moscoso. She stated that she checked this am and it still showed her as pending under medical review. She stated that they do not have beds available today, but might have a bed tomorrow if we get precert auth. [phone]- received call from Sydni Moscoso with [facility]- she stated that they do have auth for the pt and are able to accept once they have a bed available- possibly tomorrow.

## 2021-09-14 NOTE — PLAN OF CARE
Problem: OXYGENATION/RESPIRATORY FUNCTION  Goal: Patient will maintain patent airway  9/14/2021 1954 by Raul Arevalo RCP  Outcome: Ongoing     Problem: OXYGENATION/RESPIRATORY FUNCTION  Goal: Patient will achieve/maintain normal respiratory rate/effort  Description: Respiratory rate and effort will be within normal limits for the patient  9/14/2021 1954 by Raul Arevalo RCP  Outcome: Ongoing     Problem: MECHANICAL VENTILATION  Goal: Patient will maintain patent airway  9/14/2021 1954 by Raul Arevalo RCP  Outcome: Ongoing     Problem: MECHANICAL VENTILATION  Goal: Oral health is maintained or improved  9/14/2021 1954 by Raul Arevalo RCP  Outcome: Ongoing     Problem: MECHANICAL VENTILATION  Goal: Ability to express needs and understand communication  9/14/2021 1954 by Raul Arevalo RCP  Outcome: Ongoing     Problem: MECHANICAL VENTILATION  Goal: Mobility/activity is maintained at optimum level for patient  9/14/2021 1954 by Raul Arevalo RCP  Outcome: Ongoing     Problem: MECHANICAL VENTILATION  Goal: Tracheostomy will be managed safely  9/14/2021 1954 by Raul Arevalo RCP  Outcome: Ongoing     Problem: SKIN INTEGRITY  Goal: Skin integrity is maintained or improved  9/14/2021 1954 by Raul Arevalo RCP  Outcome: Ongoing
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
## 2021-09-14 NOTE — PROGRESS NOTES
09/14/21 1635   Surgical Airway (trach) Shiley Cuffed   Placement Date: 08/29/21   Placed By: In surgery  Surgical Airway Type: Tracheostomy  Brand: Sonia  Style: Cuffed  Size (mm): 8   Status Secured   Site Assessment Clean;Dry   Site Care Cleansed;Dressing applied   Inner Cannula Care Changed/new   Ties Assessment Clean;Secure   Trach care completed

## 2021-09-14 NOTE — PLAN OF CARE
Problem: OXYGENATION/RESPIRATORY FUNCTION  Goal: Patient will maintain patent airway  9/14/2021 0615 by Bruno Canales RCP  Outcome: Ongoing     Problem: OXYGENATION/RESPIRATORY FUNCTION  Goal: Patient will achieve/maintain normal respiratory rate/effort  Description: Respiratory rate and effort will be within normal limits for the patient  9/14/2021 0615 by Bruno Canales RCP  Outcome: Ongoing     Problem: MECHANICAL VENTILATION  Goal: Patient will maintain patent airway  9/14/2021 0615 by Bruno Canales RCP  Outcome: Ongoing     Problem: MECHANICAL VENTILATION  Goal: Oral health is maintained or improved  9/14/2021 0615 by Bruno Canales RCP  Outcome: Ongoing     Problem: MECHANICAL VENTILATION  Goal: Ability to express needs and understand communication  9/14/2021 0615 by Bruno Canales RCP  Outcome: Ongoing     Problem: MECHANICAL VENTILATION  Goal: Mobility/activity is maintained at optimum level for patient  9/14/2021 0615 by Bruno Canales RCP  Outcome: Ongoing     Problem: MECHANICAL VENTILATION  Goal: Tracheostomy will be managed safely  9/14/2021 0615 by Bruno Canales RCP  Outcome: Ongoing     Problem: SKIN INTEGRITY  Goal: Skin integrity is maintained or improved  9/14/2021 0615 by Bruno Canales RCP  Outcome: Ongoing

## 2021-09-15 PROCEDURE — 92610 EVALUATE SWALLOWING FUNCTION: CPT

## 2021-09-15 PROCEDURE — 6370000000 HC RX 637 (ALT 250 FOR IP): Performed by: STUDENT IN AN ORGANIZED HEALTH CARE EDUCATION/TRAINING PROGRAM

## 2021-09-15 PROCEDURE — 97116 GAIT TRAINING THERAPY: CPT

## 2021-09-15 PROCEDURE — 94761 N-INVAS EAR/PLS OXIMETRY MLT: CPT

## 2021-09-15 PROCEDURE — 6360000002 HC RX W HCPCS: Performed by: STUDENT IN AN ORGANIZED HEALTH CARE EDUCATION/TRAINING PROGRAM

## 2021-09-15 PROCEDURE — 2060000000 HC ICU INTERMEDIATE R&B

## 2021-09-15 PROCEDURE — 97166 OT EVAL MOD COMPLEX 45 MIN: CPT

## 2021-09-15 PROCEDURE — 97530 THERAPEUTIC ACTIVITIES: CPT

## 2021-09-15 PROCEDURE — 2700000000 HC OXYGEN THERAPY PER DAY

## 2021-09-15 PROCEDURE — 97535 SELF CARE MNGMENT TRAINING: CPT

## 2021-09-15 RX ADMIN — CHLORHEXIDINE GLUCONATE 0.12% ORAL RINSE 15 ML: 1.2 LIQUID ORAL at 08:19

## 2021-09-15 RX ADMIN — ENOXAPARIN SODIUM 30 MG: 30 INJECTION SUBCUTANEOUS at 08:18

## 2021-09-15 RX ADMIN — PROPRANOLOL HYDROCHLORIDE 10 MG: 10 TABLET ORAL at 08:18

## 2021-09-15 RX ADMIN — POLYETHYLENE GLYCOL 3350 17 G: 17 POWDER, FOR SOLUTION ORAL at 08:18

## 2021-09-15 RX ADMIN — PROPRANOLOL HYDROCHLORIDE 10 MG: 10 TABLET ORAL at 15:16

## 2021-09-15 RX ADMIN — PROPRANOLOL HYDROCHLORIDE 10 MG: 10 TABLET ORAL at 20:10

## 2021-09-15 RX ADMIN — AMANTADINE HYDROCHLORIDE 100 MG: 50 SOLUTION ORAL at 08:18

## 2021-09-15 RX ADMIN — AMANTADINE HYDROCHLORIDE 100 MG: 50 SOLUTION ORAL at 15:16

## 2021-09-15 RX ADMIN — ENOXAPARIN SODIUM 30 MG: 30 INJECTION SUBCUTANEOUS at 20:10

## 2021-09-15 ASSESSMENT — PAIN DESCRIPTION - LOCATION
LOCATION: GENERALIZED
LOCATION: GENERALIZED

## 2021-09-15 ASSESSMENT — PAIN SCALES - GENERAL
PAINLEVEL_OUTOF10: 3
PAINLEVEL_OUTOF10: 0
PAINLEVEL_OUTOF10: 3

## 2021-09-15 ASSESSMENT — PAIN DESCRIPTION - PAIN TYPE: TYPE: ACUTE PAIN;SURGICAL PAIN

## 2021-09-15 ASSESSMENT — PULMONARY FUNCTION TESTS
PIF_VALUE: 13
PIF_VALUE: 14
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
## 2021-09-15 NOTE — PLAN OF CARE
Planning:  Goal: Ability to perform activities of daily living will improve  Description: Ability to perform activities of daily living will improve  Outcome: Ongoing  Goal: Participates in care planning  Description: Participates in care planning  Outcome: Ongoing     Problem: Injury - Risk of, Physical Injury:  Goal: Absence of physical injury  Description: Absence of physical injury  Outcome: Ongoing  Goal: Will remain free from falls  Description: Will remain free from falls  Outcome: Ongoing     Problem: Mood - Altered:  Goal: Mood stable  Description: Mood stable  Outcome: Ongoing  Goal: Absence of abusive behavior  Description: Absence of abusive behavior  Outcome: Ongoing  Goal: Verbalizations of feeling emotionally comfortable while being cared for will increase  Description: Verbalizations of feeling emotionally comfortable while being cared for will increase  Outcome: Ongoing     Problem: Psychomotor Activity - Altered:  Goal: Absence of psychomotor disturbance signs and symptoms  Description: Absence of psychomotor disturbance signs and symptoms  Outcome: Ongoing     Problem: Sensory Perception - Impaired:  Goal: Demonstrations of improved sensory functioning will increase  Description: Demonstrations of improved sensory functioning will increase  Outcome: Ongoing  Goal: Decrease in sensory misperception frequency  Description: Decrease in sensory misperception frequency  Outcome: Ongoing  Goal: Able to refrain from responding to false sensory perceptions  Description: Able to refrain from responding to false sensory perceptions  Outcome: Ongoing  Goal: Demonstrates accurate environmental perceptions  Description: Demonstrates accurate environmental perceptions  Outcome: Ongoing  Goal: Able to distinguish between reality-based and nonreality-based thinking  Description: Able to distinguish between reality-based and nonreality-based thinking  Outcome: Ongoing  Goal: Able to interrupt nonreality-based thinking  Description: Able to interrupt nonreality-based thinking  Outcome: Ongoing     Problem: Sleep Pattern Disturbance:  Goal: Appears well-rested  Description: Appears well-rested  Outcome: Ongoing     Problem: Skin Integrity:  Goal: Will show no infection signs and symptoms  Description: Will show no infection signs and symptoms  Outcome: Ongoing  Goal: Absence of new skin breakdown  Description: Absence of new skin breakdown  Outcome: Ongoing     Problem: Falls - Risk of:  Goal: Absence of physical injury  Description: Absence of physical injury  Outcome: Ongoing  Goal: Will remain free from falls  Description: Will remain free from falls  Outcome: Ongoing     Problem: Nutrition  Goal: Optimal nutrition therapy  Outcome: Ongoing

## 2021-09-15 NOTE — PROGRESS NOTES
Spoke with Monique Llamas CNP Trauma. She reports pt is improving and has orders to transfer to [facility]. Anticipate decannulation of trach tomorrow. Requesting PMR eval for TBI Rehab.  is updated on plan and informed pt will need ARU choices.
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
## 2021-09-15 NOTE — PROGRESS NOTES
09/15/21 6033   Surgical Airway (trach) Shiley Cuffed   Placement Date: 08/29/21   Placed By: In surgery  Surgical Airway Type: Tracheostomy  Brand: Sonia  Style: Cuffed  Size (mm): 8   Status Secured   Site Assessment Drainage   Site Care Cleansed;Dried;Dressing applied   Inner Cannula Care Changed/new   Ties Assessment Secure;Dry; Intact     Trach care completed w/o incident
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
## 2021-09-15 NOTE — PROGRESS NOTES
Patient suctioned for moderate amount of clear secretions. No return of blue dye suctioned per trach 4 hours following swallow study.
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
## 2021-09-15 NOTE — PROGRESS NOTES
No  Subjective  Subjective: Pt resting in bed upon arrival, eager to participate in PT. Trach mask removed for treatment, RN kelby . Vitals remained WNL  Pain Screening  Patient Currently in Pain: Yes  Pain Assessment  Pain Assessment: 0-10  Pain Level: 3  Pain Type: Acute pain;Surgical pain  Pain Location: Generalized  Vital Signs  Patient Currently in Pain: Yes  Pre Treatment Pain Screening  Intervention List: Patient able to continue with treatment    Orientation  Orientation  Overall Orientation Status: Impaired  Orientation Level: Unable to assess (Unable to fully assess due to non verbal, not answering all questions)  Cognition      Objective   Bed mobility  Rolling to Right: Minimal assistance;2 Person assistance  Supine to Sit: Minimal assistance;2 Person assistance  Scooting: Minimal assistance;2 Person assistance  Transfers  Sit to Stand: Minimal Assistance;2 Person Assistance  Stand to sit: Minimal Assistance;2 Person Assistance  Bed to Chair: Minimal assistance;2 Person Assistance  Comment: MAX cues for sequencing with fair carryover  Ambulation  Ambulation?: Yes  More Ambulation?: No  Ambulation 1  Surface: level tile  Device: Rolling Walker  Assistance: Minimal assistance;2 Person assistance  Quality of Gait: flexed posture, narrow CAROLYNN, difficulty advancing B LE, MIN A x2 for weight shifting  Gait Deviations: Slow Corine;Decreased step length;Decreased step height;Decreased arm swing;Decreased head and trunk rotation; Shuffles  Distance: ~5 ft to chair  Stairs/Curb  Stairs?: No     Balance  Posture: Fair  Sitting - Static: Fair;+  Sitting - Dynamic: Fair (Pt sat EOB x ~5 min, requiring SBA up to MIN A due to trunk instability)  Standing - Static: Fair;-  Standing - Dynamic: Fair;-  Comments: Pt stood with RW x 3 trials, maintaining ~2 min each, postural cues given throughout   Exercise  Seated LE exercise program: Long Arc Quads, hip abduction/adduction, heel/toe raises, and marches.  Reps: 5x    AM-PAC Score  -[facility] Inpatient Mobility Raw Score : 13 (09/15/21 1619)  AM-PAC Inpatient T-Scale Score : 36.74 (09/15/21 1619)  Mobility Inpatient CMS 0-100% Score: 64.91 (09/15/21 1619)  Mobility Inpatient CMS G-Code Modifier : CL (09/15/21 1619)          Goals  Short term goals  Time Frame for Short term goals: 10 visits from today, 9/13/21  Short term goal 1: Supine to/from sit with min A. Short term goal 2: Sit to stand with mod A, hips and knees fully extended. Short term goal 3: Ambulate 22' with walker with mod A.   Patient Goals   Patient goals : Unable to state    Plan    Plan  Times per week: 5-6 visits weekly  Times per day: Daily  Current Treatment Recommendations: Strengthening, ROM, Cognitive Reorientation, Functional Mobility Training, Transfer Training, Equipment Evaluation, Education, & procurement, Patient/Caregiver Education & Training, Gait Training, Safety Education & Training, Balance Training, Endurance Training, Wheelchair Mobility Training, Neuromuscular Re-education, Home Exercise Program, Positioning  Safety Devices  Type of devices: Call light within reach, Gait belt, Patient at risk for falls, Left in chair, Nurse notified  Restraints  Initially in place: No     Therapy Time   Individual Concurrent Group Co-treatment   Time In 1102         Time Out 1138         Minutes 36         Timed Code Treatment Minutes: 213 [facility]

## 2021-09-15 NOTE — PLAN OF CARE
Problem: OXYGENATION/RESPIRATORY FUNCTION  Goal: Patient will maintain patent airway  9/15/2021 0736 by Syed Blair RCP  Outcome: Ongoing     Problem: OXYGENATION/RESPIRATORY FUNCTION  Goal: Patient will achieve/maintain normal respiratory rate/effort  Description: Respiratory rate and effort will be within normal limits for the patient  9/15/2021 0736 by Syed Blair RCP  Outcome: Ongoing    PROVIDE ADEQUATE OXYGENATION WITH ACCEPTABLE SP02/ABG'S    [x]  IDENTIFY APPROPRIATE OXYGEN THERAPY  [x]   MONITOR SP02/ABG'S AS NEEDED   [x]   PATIENT EDUCATION AS NEEDED

## 2021-09-16 PROCEDURE — 97535 SELF CARE MNGMENT TRAINING: CPT

## 2021-09-16 PROCEDURE — 6370000000 HC RX 637 (ALT 250 FOR IP): Performed by: STUDENT IN AN ORGANIZED HEALTH CARE EDUCATION/TRAINING PROGRAM

## 2021-09-16 PROCEDURE — 2700000000 HC OXYGEN THERAPY PER DAY

## 2021-09-16 PROCEDURE — 97110 THERAPEUTIC EXERCISES: CPT

## 2021-09-16 PROCEDURE — 2060000000 HC ICU INTERMEDIATE R&B

## 2021-09-16 PROCEDURE — 97116 GAIT TRAINING THERAPY: CPT

## 2021-09-16 PROCEDURE — 6360000002 HC RX W HCPCS: Performed by: STUDENT IN AN ORGANIZED HEALTH CARE EDUCATION/TRAINING PROGRAM

## 2021-09-16 PROCEDURE — 94761 N-INVAS EAR/PLS OXIMETRY MLT: CPT

## 2021-09-16 PROCEDURE — 99221 1ST HOSP IP/OBS SF/LOW 40: CPT | Performed by: STUDENT IN AN ORGANIZED HEALTH CARE EDUCATION/TRAINING PROGRAM

## 2021-09-16 RX ADMIN — AMANTADINE HYDROCHLORIDE 100 MG: 50 SOLUTION ORAL at 17:08

## 2021-09-16 RX ADMIN — ENOXAPARIN SODIUM 30 MG: 30 INJECTION SUBCUTANEOUS at 20:47

## 2021-09-16 RX ADMIN — PROPRANOLOL HYDROCHLORIDE 10 MG: 10 TABLET ORAL at 09:30

## 2021-09-16 RX ADMIN — ENOXAPARIN SODIUM 30 MG: 30 INJECTION SUBCUTANEOUS at 09:30

## 2021-09-16 RX ADMIN — AMANTADINE HYDROCHLORIDE 100 MG: 50 SOLUTION ORAL at 09:29

## 2021-09-16 RX ADMIN — PROPRANOLOL HYDROCHLORIDE 10 MG: 10 TABLET ORAL at 17:06

## 2021-09-16 ASSESSMENT — PAIN SCALES - GENERAL
PAINLEVEL_OUTOF10: 0
PAINLEVEL_OUTOF10: 7
PAINLEVEL_OUTOF10: 0
PAINLEVEL_OUTOF10: 5
PAINLEVEL_OUTOF10: 0
PAINLEVEL_OUTOF10: 0

## 2021-09-16 ASSESSMENT — PAIN SCALES - WONG BAKER: WONGBAKER_NUMERICALRESPONSE: 0

## 2021-09-16 NOTE — PLAN OF CARE
Patient is resting quietly. Continues without injury to self and remains free from falls. Skin assessed per shift and prn, repositioned for comfort as needed. TF infusing and explained about aspiration safety, HOB elevated.  VSS

## 2021-09-16 NOTE — PLAN OF CARE
Problem: OXYGENATION/RESPIRATORY FUNCTION  Goal: Patient will maintain patent airway  Outcome: Ongoing     Problem: OXYGENATION/RESPIRATORY FUNCTION  Goal: Patient will achieve/maintain normal respiratory rate/effort  Description: Respiratory rate and effort will be within normal limits for the patient  Outcome: Ongoing     Problem: MECHANICAL VENTILATION  Goal: Patient will maintain patent airway  Outcome: Completed     Problem: MECHANICAL VENTILATION  Goal: Oral health is maintained or improved  Outcome: Ongoing     Problem: MECHANICAL VENTILATION  Goal: Ability to express needs and understand communication  Outcome: Completed     Problem: MECHANICAL VENTILATION  Goal: Mobility/activity is maintained at optimum level for patient  Outcome: Completed     Problem: MECHANICAL VENTILATION  Goal: Tracheostomy will be managed safely  Outcome: Ongoing     Problem: RESPIRATORY  Intervention: Provide oxygen therapy  Note:   PROVIDE ADEQUATE OXYGENATION WITH ACCEPTABLE SP02/ABG'S    [x]  IDENTIFY APPROPRIATE OXYGEN THERAPY  [x]   MONITOR SP02/ABG'S AS NEEDED   [x]   PATIENT EDUCATION AS NEEDED
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
## 2021-09-16 NOTE — PLAN OF CARE
Problem: OXYGENATION/RESPIRATORY FUNCTION  Goal: Patient will maintain patent airway  9/16/2021 0851 by Arnaldo Cazares RCP  Outcome: Ongoing     Problem: OXYGENATION/RESPIRATORY FUNCTION  Goal: Patient will achieve/maintain normal respiratory rate/effort  Description: Respiratory rate and effort will be within normal limits for the patient  9/16/2021 0851 by Arnaldo Cazares RCP  Outcome: Ongoing     Problem: SKIN INTEGRITY  Goal: Skin integrity is maintained or improved  9/16/2021 0851 by Arnaldo Cazares RCP  Outcome: Ongoing     PROVIDE ADEQUATE OXYGENATION WITH ACCEPTABLE SP02/ABG'S    [x]  IDENTIFY APPROPRIATE OXYGEN THERAPY  [x]   MONITOR SP02/ABG'S AS NEEDED   [x]   PATIENT EDUCATION AS NEEDED

## 2021-09-16 NOTE — PLAN OF CARE
Problem: OXYGENATION/RESPIRATORY FUNCTION  Goal: Patient will maintain patent airway  9/16/2021 0630 by Wilfrido Rutherford RN  Outcome: Ongoing  9/16/2021 0011 by Susan Campbell RCP  Outcome: Ongoing  Goal: Patient will achieve/maintain normal respiratory rate/effort  Description: Respiratory rate and effort will be within normal limits for the patient  9/16/2021 0630 by Wilfrido Rutherford RN  Outcome: Ongoing  9/16/2021 0011 by Susan Campbell RCP  Outcome: Ongoing     Problem: MECHANICAL VENTILATION  Goal: Oral health is maintained or improved  9/16/2021 0630 by Wilfrido Rutherford RN  Outcome: Ongoing  9/16/2021 0011 by Susan Campbell RCP  Outcome: Ongoing  Goal: Tracheostomy will be managed safely  9/16/2021 0630 by Wilfrido Rutherford RN  Outcome: Ongoing  9/16/2021 0011 by Susan Campbell RCP  Outcome: Ongoing     Problem: SKIN INTEGRITY  Goal: Skin integrity is maintained or improved  Outcome: Ongoing     Problem: Confusion - Acute:  Goal: Absence of continued neurological deterioration signs and symptoms  Description: Absence of continued neurological deterioration signs and symptoms  Outcome: Ongoing  Goal: Mental status will be restored to baseline  Description: Mental status will be restored to baseline  Outcome: Ongoing     Problem: Discharge Planning:  Goal: Ability to perform activities of daily living will improve  Description: Ability to perform activities of daily living will improve  Outcome: Ongoing  Goal: Participates in care planning  Description: Participates in care planning  Outcome: Ongoing     Problem: Injury - Risk of, Physical Injury:  Goal: Absence of physical injury  Description: Absence of physical injury  Outcome: Ongoing  Goal: Will remain free from falls  Description: Will remain free from falls  Outcome: Ongoing     Problem: Mood - Altered:  Goal: Mood stable  Description: Mood stable  Outcome: Ongoing  Goal: Absence of abusive behavior  Description: Absence of abusive behavior  Outcome: Nutrition  Goal: Optimal nutrition therapy  Outcome: Ongoing     Problem: Pain:  Goal: Pain level will decrease  Description: Pain level will decrease  Outcome: Ongoing  Goal: Control of acute pain  Description: Control of acute pain  Outcome: Ongoing  Goal: Control of chronic pain  Description: Control of chronic pain  Outcome: Ongoing
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
## 2021-09-16 NOTE — PLAN OF CARE
Nutrition Problem #1: Inadequate oral intake  Intervention: Food and/or Nutrient Delivery:  (Continue TF as tolerated; plan to change to bolus feeds today. Suggest 240 mL 5x/day + 150 mL H2O with each bolus.  This will provide approx 1800 kcal, 113 g pro, 1650 mL H2O/day.)  Nutritional Goals: EN intake to meet >75% of estimated nutrition needs
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
## 2021-09-16 NOTE — CARE COORDINATION
Transitional Planning:    Called the pt's mom, Mira Solano- discussed ARU options. Discussed that CM found ARU in [place] at [facility] that is able to accept TBI patients. She stated that she was ok with referral being sent there. 63- called [facility] at [phone] to get more information on where to send referral to. Spoke with Vargas Varela, she stated that the pt sounds appropriate for ARU and to send the referral to [phone], that they are in network and that a covid test is NOT needed due to pt being vaccinated. Referral faxed to # above.
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
## 2021-09-17 PROCEDURE — 6370000000 HC RX 637 (ALT 250 FOR IP): Performed by: STUDENT IN AN ORGANIZED HEALTH CARE EDUCATION/TRAINING PROGRAM

## 2021-09-17 PROCEDURE — 92523 SPEECH SOUND LANG COMPREHEN: CPT

## 2021-09-17 PROCEDURE — 97530 THERAPEUTIC ACTIVITIES: CPT

## 2021-09-17 PROCEDURE — 6360000002 HC RX W HCPCS: Performed by: STUDENT IN AN ORGANIZED HEALTH CARE EDUCATION/TRAINING PROGRAM

## 2021-09-17 PROCEDURE — 99232 SBSQ HOSP IP/OBS MODERATE 35: CPT | Performed by: PHYSICAL MEDICINE & REHABILITATION

## 2021-09-17 PROCEDURE — 97110 THERAPEUTIC EXERCISES: CPT

## 2021-09-17 PROCEDURE — 2060000000 HC ICU INTERMEDIATE R&B

## 2021-09-17 PROCEDURE — 94761 N-INVAS EAR/PLS OXIMETRY MLT: CPT

## 2021-09-17 PROCEDURE — 99232 SBSQ HOSP IP/OBS MODERATE 35: CPT | Performed by: FAMILY MEDICINE

## 2021-09-17 RX ADMIN — ENOXAPARIN SODIUM 30 MG: 30 INJECTION SUBCUTANEOUS at 20:35

## 2021-09-17 RX ADMIN — ENOXAPARIN SODIUM 30 MG: 30 INJECTION SUBCUTANEOUS at 08:54

## 2021-09-17 RX ADMIN — PROPRANOLOL HYDROCHLORIDE 10 MG: 10 TABLET ORAL at 14:29

## 2021-09-17 RX ADMIN — AMANTADINE HYDROCHLORIDE 100 MG: 50 SOLUTION ORAL at 14:29

## 2021-09-17 RX ADMIN — PROPRANOLOL HYDROCHLORIDE 10 MG: 10 TABLET ORAL at 20:35

## 2021-09-17 RX ADMIN — AMANTADINE HYDROCHLORIDE 100 MG: 50 SOLUTION ORAL at 08:53

## 2021-09-17 RX ADMIN — PROPRANOLOL HYDROCHLORIDE 10 MG: 10 TABLET ORAL at 08:53

## 2021-09-17 ASSESSMENT — PAIN SCALES - GENERAL
PAINLEVEL_OUTOF10: 0
PAINLEVEL_OUTOF10: 4

## 2021-09-17 NOTE — PLAN OF CARE
Problem: OXYGENATION/RESPIRATORY FUNCTION  Goal: Patient will maintain patent airway  Outcome: Met This Shift  Goal: Patient will achieve/maintain normal respiratory rate/effort  Description: Respiratory rate and effort will be within normal limits for the patient  Outcome: Met This Shift     Problem: SKIN INTEGRITY  Goal: Skin integrity is maintained or improved  Outcome: Met This Shift     Problem: Confusion - Acute:  Goal: Absence of continued neurological deterioration signs and symptoms  Description: Absence of continued neurological deterioration signs and symptoms  Outcome: Met This Shift  Goal: Mental status will be restored to baseline  Description: Mental status will be restored to baseline  Outcome: Met This Shift     Problem: Discharge Planning:  Goal: Ability to perform activities of daily living will improve  Description: Ability to perform activities of daily living will improve  Outcome: Ongoing  Goal: Participates in care planning  Description: Participates in care planning  Outcome: Ongoing     Problem: Injury - Risk of, Physical Injury:  Goal: Absence of physical injury  Description: Absence of physical injury  Outcome: Met This Shift  Goal: Will remain free from falls  Description: Will remain free from falls  Outcome: Met This Shift     Problem: Mood - Altered:  Goal: Mood stable  Description: Mood stable  Outcome: Ongoing  Goal: Absence of abusive behavior  Description: Absence of abusive behavior  Outcome: Met This Shift  Goal: Verbalizations of feeling emotionally comfortable while being cared for will increase  Description: Verbalizations of feeling emotionally comfortable while being cared for will increase  Outcome: Met This Shift     Problem: Psychomotor Activity - Altered:  Goal: Absence of psychomotor disturbance signs and symptoms  Description: Absence of psychomotor disturbance signs and symptoms  Outcome: Met This Shift     Problem: Sleep Pattern Disturbance:  Goal: Appears well-rested  Description: Appears well-rested  Outcome: Met This Shift     Problem: Skin Integrity:  Goal: Will show no infection signs and symptoms  Description: Will show no infection signs and symptoms  Outcome: Met This Shift  Goal: Absence of new skin breakdown  Description: Absence of new skin breakdown  Outcome: Met This Shift     Problem: Falls - Risk of:  Goal: Absence of physical injury  Description: Absence of physical injury  Outcome: Met This Shift  Goal: Will remain free from falls  Description: Will remain free from falls  Outcome: Met This Shift     Problem: Nutrition  Goal: Optimal nutrition therapy  Outcome: Ongoing     Problem: Pain:  Goal: Pain level will decrease  Description: Pain level will decrease  Outcome: Met This Shift  Goal: Control of acute pain  Description: Control of acute pain  Outcome: Met This Shift  Goal: Control of chronic pain  Description: Control of chronic pain  Outcome: Met This Shift
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
## 2021-09-17 NOTE — PROGRESS NOTES
Speech Language Pathology  [address] [facility]  Speech Language Pathology    Date: 9/17/2021  Patient Name: Cassidy Lauren  YOB: 1985   AGE: 38 y.o. MRN: [id number]    Spoke with RN- per TICU RN yesterday no blue dye was suctioned following blue dye swallow completed 9/15/21. Speech therapy recommends Modifed Barium Swallow Study to further assess swallow function given intubation/trach history to confirm/rule out aspiration and determine diet recommendations.       Naya Beyer, SLP, M.S. [address] [facility]
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
## 2021-09-17 NOTE — PLAN OF CARE
Problem: OXYGENATION/RESPIRATORY FUNCTION  Goal: Patient will maintain patent airway  9/16/2021 2335 by Mel Davis RN  Outcome: Ongoing  9/16/2021 1338 by Jennifer Soto RN  Outcome: Ongoing  Goal: Patient will achieve/maintain normal respiratory rate/effort  Description: Respiratory rate and effort will be within normal limits for the patient  Outcome: Ongoing     Problem: SKIN INTEGRITY  Goal: Skin integrity is maintained or improved  9/16/2021 2335 by Mel Davis RN  Outcome: Ongoing  9/16/2021 1338 by Jennifer Soto RN  Outcome: Ongoing     Problem: Confusion - Acute:  Goal: Absence of continued neurological deterioration signs and symptoms  Description: Absence of continued neurological deterioration signs and symptoms  Outcome: Ongoing  Goal: Mental status will be restored to baseline  Description: Mental status will be restored to baseline  Outcome: Ongoing     Problem: Discharge Planning:  Goal: Ability to perform activities of daily living will improve  Description: Ability to perform activities of daily living will improve  Outcome: Ongoing  Goal: Participates in care planning  Description: Participates in care planning  Outcome: Ongoing     Problem: Injury - Risk of, Physical Injury:  Goal: Absence of physical injury  Description: Absence of physical injury  9/16/2021 2335 by Mel Davis RN  Outcome: Ongoing  9/16/2021 1338 by Jennifer Soto RN  Outcome: Ongoing  Goal: Will remain free from falls  Description: Will remain free from falls  Outcome: Ongoing     Problem: Mood - Altered:  Goal: Mood stable  Description: Mood stable  Outcome: Ongoing  Goal: Absence of abusive behavior  Description: Absence of abusive behavior  Outcome: Ongoing  Goal: Verbalizations of feeling emotionally comfortable while being cared for will increase  Description: Verbalizations of feeling emotionally comfortable while being cared for will increase  Outcome: Ongoing     Problem: Psychomotor Activity - Altered:  Goal: Absence of psychomotor disturbance signs and symptoms  Description: Absence of psychomotor disturbance signs and symptoms  Outcome: Ongoing     Problem: Sensory Perception - Impaired:  Goal: Demonstrations of improved sensory functioning will increase  Description: Demonstrations of improved sensory functioning will increase  Outcome: Ongoing  Goal: Decrease in sensory misperception frequency  Description: Decrease in sensory misperception frequency  Outcome: Ongoing  Goal: Able to refrain from responding to false sensory perceptions  Description: Able to refrain from responding to false sensory perceptions  Outcome: Ongoing  Goal: Demonstrates accurate environmental perceptions  Description: Demonstrates accurate environmental perceptions  Outcome: Ongoing  Goal: Able to distinguish between reality-based and nonreality-based thinking  Description: Able to distinguish between reality-based and nonreality-based thinking  Outcome: Ongoing  Goal: Able to interrupt nonreality-based thinking  Description: Able to interrupt nonreality-based thinking  Outcome: Ongoing     Problem: Sleep Pattern Disturbance:  Goal: Appears well-rested  Description: Appears well-rested  Outcome: Ongoing     Problem: Skin Integrity:  Goal: Will show no infection signs and symptoms  Description: Will show no infection signs and symptoms  Outcome: Ongoing  Goal: Absence of new skin breakdown  Description: Absence of new skin breakdown  Outcome: Ongoing     Problem: Falls - Risk of:  Goal: Absence of physical injury  Description: Absence of physical injury  9/16/2021 2335 by Michael Sorenson RN  Outcome: Ongoing  9/16/2021 1338 by Savannah Dial RN  Outcome: Ongoing  Goal: Will remain free from falls  Description: Will remain free from falls  Outcome: Ongoing     Problem: Nutrition  Goal: Optimal nutrition therapy  9/16/2021 2335 by Michael Sorenson RN  Outcome: Ongoing  9/16/2021 1247 by Earlene Mcqueen RD, LD  Outcome: Ongoing  Note: Nutrition Problem
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
## 2021-09-17 NOTE — CARE COORDINATION
Transitional planning:  Received vm from Chris Cartagena in admissions at  [facility] at [phone].     [address] back and states awaiting precert and acceptance by physician. Later today, they will call back for acceptance. [address] called back and states they have accepted pt. May have precert by Monday. Nurse report number is [phone].

## 2021-09-17 NOTE — PROGRESS NOTES
Notified that pt immune enhancing tube feeds not available at this time per RN. Switched to high protein formula until dietary available at day shift with plan to switch back to immune enhancing.     Gabe Weinstein  PGY1
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
## 2021-09-18 ENCOUNTER — APPOINTMENT (OUTPATIENT)
Dept: GENERAL RADIOLOGY | Age: 36
DRG: 003 | End: 2021-09-18
Payer: COMMERCIAL

## 2021-09-18 LAB — GLUCOSE BLD-MCNC: 127 MG/DL (ref 65–105)

## 2021-09-18 PROCEDURE — 92611 MOTION FLUOROSCOPY/SWALLOW: CPT

## 2021-09-18 PROCEDURE — 74230 X-RAY XM SWLNG FUNCJ C+: CPT

## 2021-09-18 PROCEDURE — 6370000000 HC RX 637 (ALT 250 FOR IP): Performed by: STUDENT IN AN ORGANIZED HEALTH CARE EDUCATION/TRAINING PROGRAM

## 2021-09-18 PROCEDURE — 6360000002 HC RX W HCPCS: Performed by: STUDENT IN AN ORGANIZED HEALTH CARE EDUCATION/TRAINING PROGRAM

## 2021-09-18 PROCEDURE — 2060000000 HC ICU INTERMEDIATE R&B

## 2021-09-18 PROCEDURE — 82947 ASSAY GLUCOSE BLOOD QUANT: CPT

## 2021-09-18 RX ADMIN — PROPRANOLOL HYDROCHLORIDE 10 MG: 10 TABLET ORAL at 07:52

## 2021-09-18 RX ADMIN — ENOXAPARIN SODIUM 30 MG: 30 INJECTION SUBCUTANEOUS at 21:12

## 2021-09-18 RX ADMIN — ENOXAPARIN SODIUM 30 MG: 30 INJECTION SUBCUTANEOUS at 07:52

## 2021-09-18 RX ADMIN — PROPRANOLOL HYDROCHLORIDE 10 MG: 10 TABLET ORAL at 13:44

## 2021-09-18 RX ADMIN — AMANTADINE HYDROCHLORIDE 100 MG: 50 SOLUTION ORAL at 13:41

## 2021-09-18 RX ADMIN — AMANTADINE HYDROCHLORIDE 100 MG: 50 SOLUTION ORAL at 07:51

## 2021-09-18 ASSESSMENT — PAIN SCALES - GENERAL
PAINLEVEL_OUTOF10: 0
PAINLEVEL_OUTOF10: 0
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
## 2021-09-18 NOTE — PLAN OF CARE
Problem: OXYGENATION/RESPIRATORY FUNCTION  Goal: Patient will maintain patent airway  9/18/2021 1550 by Vianey Haney RN  Outcome: Met This Shift     Problem: OXYGENATION/RESPIRATORY FUNCTION  Goal: Patient will achieve/maintain normal respiratory rate/effort  Description: Respiratory rate and effort will be within normal limits for the patient  9/18/2021 1550 by Vianey Haney RN  Outcome: Met This Shift     Problem: SKIN INTEGRITY  Goal: Skin integrity is maintained or improved  9/18/2021 1550 by Vianey Haney RN  Outcome: Met This Shift     Problem: Confusion - Acute:  Goal: Absence of continued neurological deterioration signs and symptoms  Description: Absence of continued neurological deterioration signs and symptoms  9/18/2021 1550 by Vianey Haney RN  Outcome: Met This Shift     Problem: Confusion - Acute:  Goal: Mental status will be restored to baseline  Description: Mental status will be restored to baseline  9/18/2021 1550 by Vianey Haney RN  Outcome: Ongoing     Problem: Discharge Planning:  Goal: Ability to perform activities of daily living will improve  Description: Ability to perform activities of daily living will improve  9/18/2021 1550 by Vianey Haney RN  Outcome: Met This Shift     Problem: Discharge Planning:  Goal: Participates in care planning  Description: Participates in care planning  9/18/2021 1550 by iVaney Haney RN  Outcome: Ongoing     Problem: Injury - Risk of, Physical Injury:  Goal: Absence of physical injury  Description: Absence of physical injury  9/18/2021 1550 by Vianey Haney RN  Outcome: Met This Shift     Problem: Injury - Risk of, Physical Injury:  Goal: Will remain free from falls  Description: Will remain free from falls  9/18/2021 1550 by Vianey Haney RN  Outcome: Met This Shift     Problem: Mood - Altered:  Goal: Mood stable  Description: Mood stable  9/18/2021 1550 by Vianey Haney RN  Outcome: Ongoing     Problem: Mood - Altered:  Goal: Absence of abusive behavior  Description: Absence of abusive behavior  9/18/2021 1550 by Michelle Díaz RN  Outcome: Met This Shift     Problem: Mood - Altered:  Goal: Verbalizations of feeling emotionally comfortable while being cared for will increase  Description: Verbalizations of feeling emotionally comfortable while being cared for will increase  9/18/2021 1550 by Michelle Díaz RN  Outcome: Ongoing     Problem: Psychomotor Activity - Altered:  Goal: Absence of psychomotor disturbance signs and symptoms  Description: Absence of psychomotor disturbance signs and symptoms  9/18/2021 1550 by Michelle Díaz RN  Outcome: Met This Shift     Problem: Sleep Pattern Disturbance:  Goal: Appears well-rested  Description: Appears well-rested  9/18/2021 1550 by Michelle Díaz RN  Outcome: Met This Shift     Problem: Skin Integrity:  Goal: Will show no infection signs and symptoms  Description: Will show no infection signs and symptoms  9/18/2021 1550 by Michelle Díaz RN  Outcome: Met This Shift     Problem: Skin Integrity:  Goal: Absence of new skin breakdown  Description: Absence of new skin breakdown  9/18/2021 1550 by Michelle Díaz RN  Outcome: Met This Shift     Problem: Falls - Risk of:  Goal: Absence of physical injury  Description: Absence of physical injury  9/18/2021 1550 by Michelle Díaz RN  Outcome: Met This Shift     Problem: Falls - Risk of:  Goal: Will remain free from falls  Description: Will remain free from falls  9/18/2021 1550 by Michelle Díaz RN  Outcome: Met This Shift     Problem: Nutrition  Goal: Optimal nutrition therapy  9/18/2021 1550 by Michelle Díaz RN  Outcome: Ongoing     Problem: Pain:  Goal: Pain level will decrease  Description: Pain level will decrease  9/18/2021 1550 by Michelle Díaz RN  Outcome: Met This Shift     Problem: Pain:  Goal: Control of acute pain  Description: Control of acute pain  9/18/2021 1550 by Michelle Díaz RN  Outcome: Met This Shift     Problem: Pain:  Goal: Control of chronic pain  Description: Control of chronic pain  9/18/2021 1550 by Basilia Aguillon RN  Outcome: Met This Shift

## 2021-09-18 NOTE — PLAN OF CARE
Problem: OXYGENATION/RESPIRATORY FUNCTION  Goal: Patient will maintain patent airway  9/18/2021 0239 by Germain Sandoval RN  Outcome: Ongoing    Goal: Patient will achieve/maintain normal respiratory rate/effort  Description: Respiratory rate and effort will be within normal limits for the patient  9/18/2021 0239 by Germain Sandoval RN  Outcome: Ongoing       Problem: SKIN INTEGRITY  Goal: Skin integrity is maintained or improved  9/18/2021 0239 by Germain Sandoval RN  Outcome: Ongoing       Problem: Confusion - Acute:  Goal: Absence of continued neurological deterioration signs and symptoms  Description: Absence of continued neurological deterioration signs and symptoms  9/18/2021 0239 by Germain Sandoval RN  Outcome: Ongoing    Goal: Mental status will be restored to baseline  Description: Mental status will be restored to baseline  9/18/2021 0239 by Germain Sandoval RN  Outcome: Ongoing       Problem: Discharge Planning:  Goal: Ability to perform activities of daily living will improve  Description: Ability to perform activities of daily living will improve  9/18/2021 0239 by Germain Sandoval RN  Outcome: Ongoing    Goal: Participates in care planning  Description: Participates in care planning  9/18/2021 0239 by Germain Sandoval RN  Outcome: Ongoing         Problem: Injury - Risk of, Physical Injury:  Goal: Absence of physical injury  Description: Absence of physical injury  9/18/2021 0239 by Germain Sandoval RN  Outcome: Ongoing    Goal: Will remain free from falls  Description: Will remain free from falls  9/18/2021 0239 by Germain Sandoval RN  Outcome: Ongoing       Problem: Mood - Altered:  Goal: Mood stable  Description: Mood stable  9/18/2021 0239 by Germain Sandoval RN  Outcome: Ongoing    Goal: Absence of abusive behavior  Description: Absence of abusive behavior  9/18/2021 0239 by Germain Sandoval RN  Outcome: Ongoing    Goal: Verbalizations of feeling emotionally comfortable while being cared for will increase  Description: Verbalizations of feeling emotionally comfortable while being cared for will increase  9/18/2021 0239 by Silverio Feliciano RN  Outcome: Ongoing       Problem: Psychomotor Activity - Altered:  Goal: Absence of psychomotor disturbance signs and symptoms  Description: Absence of psychomotor disturbance signs and symptoms  9/18/2021 0239 by Silverio Feliciano RN  Outcome: Ongoing    Problem: Sensory Perception - Impaired:  Goal: Demonstrations of improved sensory functioning will increase  Description: Demonstrations of improved sensory functioning will increase  Outcome: Ongoing  Goal: Decrease in sensory misperception frequency  Description: Decrease in sensory misperception frequency  Outcome: Ongoing  Goal: Able to refrain from responding to false sensory perceptions  Description: Able to refrain from responding to false sensory perceptions  Outcome: Ongoing  Goal: Demonstrates accurate environmental perceptions  Description: Demonstrates accurate environmental perceptions  Outcome: Ongoing  Goal: Able to distinguish between reality-based and nonreality-based thinking  Description: Able to distinguish between reality-based and nonreality-based thinking  Outcome: Ongoing  Goal: Able to interrupt nonreality-based thinking  Description: Able to interrupt nonreality-based thinking  Outcome: Ongoing     Problem: Sleep Pattern Disturbance:  Goal: Appears well-rested  Description: Appears well-rested  9/18/2021 0239 by Silverio Feliciano RN  Outcome: Ongoing       Problem: Skin Integrity:  Goal: Will show no infection signs and symptoms  Description: Will show no infection signs and symptoms  9/18/2021 0239 by Silverio Feliciano RN  Outcome: Ongoing    Goal: Absence of new skin breakdown  Description: Absence of new skin breakdown  9/18/2021 0239 by Silverio Feliciano RN  Outcome: Ongoing    Problem: Falls - Risk of:  Goal: Absence of physical injury  Description: Absence of physical injury  9/18/2021 6607 by Jon Eldridge RN  Outcome: Ongoing    Goal: Will remain free from falls  Description: Will remain free from falls  9/18/2021 0239 by Jon Eldridge RN  Outcome: Ongoing       Problem: Nutrition  Goal: Optimal nutrition therapy  9/18/2021 0239 by Jon Eldridge RN  Outcome: Ongoing       Problem: Pain:  Goal: Pain level will decrease  Description: Pain level will decrease  9/18/2021 0239 by Jon Eldridge RN  Outcome: Ongoing  Goal: Control of acute pain  Description: Control of acute pain  9/18/2021 0239 by Jon Eldridge RN  Outcome: Ongoing    Goal: Control of chronic pain  Description: Control of chronic pain  9/18/2021 0239 by Jon Eldridge RN  Outcome: Ongoing
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
## 2021-09-19 PROCEDURE — 6360000002 HC RX W HCPCS: Performed by: STUDENT IN AN ORGANIZED HEALTH CARE EDUCATION/TRAINING PROGRAM

## 2021-09-19 PROCEDURE — 6370000000 HC RX 637 (ALT 250 FOR IP): Performed by: STUDENT IN AN ORGANIZED HEALTH CARE EDUCATION/TRAINING PROGRAM

## 2021-09-19 PROCEDURE — 94761 N-INVAS EAR/PLS OXIMETRY MLT: CPT

## 2021-09-19 PROCEDURE — 97535 SELF CARE MNGMENT TRAINING: CPT

## 2021-09-19 PROCEDURE — 2060000000 HC ICU INTERMEDIATE R&B

## 2021-09-19 PROCEDURE — 97110 THERAPEUTIC EXERCISES: CPT

## 2021-09-19 PROCEDURE — 97530 THERAPEUTIC ACTIVITIES: CPT

## 2021-09-19 PROCEDURE — 97116 GAIT TRAINING THERAPY: CPT

## 2021-09-19 RX ADMIN — POLYETHYLENE GLYCOL 3350 17 G: 17 POWDER, FOR SOLUTION ORAL at 09:10

## 2021-09-19 RX ADMIN — ENOXAPARIN SODIUM 30 MG: 30 INJECTION SUBCUTANEOUS at 09:09

## 2021-09-19 RX ADMIN — PROPRANOLOL HYDROCHLORIDE 10 MG: 10 TABLET ORAL at 13:28

## 2021-09-19 RX ADMIN — AMANTADINE HYDROCHLORIDE 100 MG: 50 SOLUTION ORAL at 13:28

## 2021-09-19 RX ADMIN — ENOXAPARIN SODIUM 30 MG: 30 INJECTION SUBCUTANEOUS at 20:53

## 2021-09-19 RX ADMIN — PROPRANOLOL HYDROCHLORIDE 10 MG: 10 TABLET ORAL at 09:08

## 2021-09-19 RX ADMIN — AMANTADINE HYDROCHLORIDE 100 MG: 50 SOLUTION ORAL at 09:08

## 2021-09-19 RX ADMIN — PROPRANOLOL HYDROCHLORIDE 10 MG: 10 TABLET ORAL at 20:53

## 2021-09-19 ASSESSMENT — PAIN SCALES - GENERAL
PAINLEVEL_OUTOF10: 0

## 2021-09-19 ASSESSMENT — PAIN SCALES - WONG BAKER: WONGBAKER_NUMERICALRESPONSE: 0

## 2021-09-19 NOTE — PLAN OF CARE
Problem: OXYGENATION/RESPIRATORY FUNCTION  Goal: Patient will maintain patent airway  9/19/2021 0506 by Baron Bolivar RN  Outcome: Ongoing     Problem: SKIN INTEGRITY  Goal: Skin integrity is maintained or improved  9/19/2021 0506 by Baron Bolivar RN  Outcome: Ongoing     Problem: Confusion - Acute:  Goal: Absence of continued neurological deterioration signs and symptoms  Description: Absence of continued neurological deterioration signs and symptoms  9/19/2021 0506 by Baron Bolivar RN  Outcome: Ongoing     Problem: Injury - Risk of, Physical Injury:  Goal: Absence of physical injury  Description: Absence of physical injury  9/19/2021 0506 by Baron Bolivar RN  Outcome: Ongoing     Problem: Skin Integrity:  Goal: Will show no infection signs and symptoms  Description: Will show no infection signs and symptoms  9/19/2021 0506 by Baron Bolivar RN  Outcome: Ongoing     Problem: Falls - Risk of:  Goal: Absence of physical injury  Description: Absence of physical injury  9/19/2021 0506 by Baron Bolivar RN  Outcome: Ongoing
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
## 2021-09-19 NOTE — PLAN OF CARE
Problem: OXYGENATION/RESPIRATORY FUNCTION  Goal: Patient will maintain patent airway  9/19/2021 1857 by Joelle Perez RN  Outcome: Met This Shift     Problem: OXYGENATION/RESPIRATORY FUNCTION  Goal: Patient will achieve/maintain normal respiratory rate/effort  Description: Respiratory rate and effort will be within normal limits for the patient  Outcome: Met This Shift     Problem: SKIN INTEGRITY  Goal: Skin integrity is maintained or improved  9/19/2021 1857 by Joelle Perez RN  Outcome: Met This Shift     Problem: Confusion - Acute:  Goal: Absence of continued neurological deterioration signs and symptoms  Description: Absence of continued neurological deterioration signs and symptoms  9/19/2021 1857 by Joelle Perez RN  Outcome: Met This Shift     Problem: Confusion - Acute:  Goal: Mental status will be restored to baseline  Description: Mental status will be restored to baseline  Outcome: Met This Shift     Problem: Discharge Planning:  Goal: Ability to perform activities of daily living will improve  Description: Ability to perform activities of daily living will improve  Outcome: Met This Shift     Problem: Discharge Planning:  Goal: Participates in care planning  Description: Participates in care planning  Outcome: Met This Shift     Problem: Injury - Risk of, Physical Injury:  Goal: Absence of physical injury  Description: Absence of physical injury  9/19/2021 1857 by Joelle Perez RN  Outcome: Met This Shift     Problem: Injury - Risk of, Physical Injury:  Goal: Will remain free from falls  Description: Will remain free from falls  Outcome: Met This Shift     Problem: Mood - Altered:  Goal: Mood stable  Description: Mood stable  Outcome: Met This Shift     Problem: Mood - Altered:  Goal: Absence of abusive behavior  Description: Absence of abusive behavior  Outcome: Met This Shift     Problem: Mood - Altered:  Goal: Verbalizations of feeling emotionally comfortable while being cared for will increase  Description: Verbalizations of feeling emotionally comfortable while being cared for will increase  Outcome: Met This Shift     Problem: Psychomotor Activity - Altered:  Goal: Absence of psychomotor disturbance signs and symptoms  Description: Absence of psychomotor disturbance signs and symptoms  Outcome: Met This Shift     Problem: Sensory Perception - Impaired:  Goal: Demonstrations of improved sensory functioning will increase  Description: Demonstrations of improved sensory functioning will increase  Outcome: Met This Shift     Problem: Sensory Perception - Impaired:  Goal: Decrease in sensory misperception frequency  Description: Decrease in sensory misperception frequency  Outcome: Met This Shift     Problem: Sensory Perception - Impaired:  Goal: Able to refrain from responding to false sensory perceptions  Description: Able to refrain from responding to false sensory perceptions  Outcome: Met This Shift     Problem: Sensory Perception - Impaired:  Goal: Demonstrates accurate environmental perceptions  Description: Demonstrates accurate environmental perceptions  Outcome: Met This Shift     Problem: Sensory Perception - Impaired:  Goal: Able to distinguish between reality-based and nonreality-based thinking  Description: Able to distinguish between reality-based and nonreality-based thinking  Outcome: Met This Shift     Problem: Sensory Perception - Impaired:  Goal: Able to interrupt nonreality-based thinking  Description: Able to interrupt nonreality-based thinking  Outcome: Met This Shift     Problem: Sleep Pattern Disturbance:  Goal: Appears well-rested  Description: Appears well-rested  Outcome: Met This Shift     Problem: Skin Integrity:  Goal: Will show no infection signs and symptoms  Description: Will show no infection signs and symptoms  9/19/2021 1857 by Merrilyn Peabody, RN  Outcome: Met This Shift     Problem: Skin Integrity:  Goal: Absence of new skin breakdown  Description: Absence of new skin breakdown  Outcome: Met This Shift     Problem: Falls - Risk of:  Goal: Absence of physical injury  Description: Absence of physical injury  9/19/2021 1857 by Basilia Aguillon RN  Outcome: Met This Shift     Problem: Falls - Risk of:  Goal: Will remain free from falls  Description: Will remain free from falls  Outcome: Met This Shift     Problem: Nutrition  Goal: Optimal nutrition therapy  Outcome: Met This Shift     Problem: Pain:  Goal: Pain level will decrease  Description: Pain level will decrease  Outcome: Met This Shift     Problem: Pain:  Goal: Control of acute pain  Description: Control of acute pain  Outcome: Met This Shift     Problem: Pain:  Goal: Control of chronic pain  Description: Control of chronic pain  Outcome: Met This Shift
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
## 2021-09-20 ENCOUNTER — APPOINTMENT (OUTPATIENT)
Dept: CT IMAGING | Age: 36
DRG: 003 | End: 2021-09-20
Payer: COMMERCIAL

## 2021-09-20 LAB — GLUCOSE BLD-MCNC: 105 MG/DL (ref 65–105)

## 2021-09-20 PROCEDURE — 92507 TX SP LANG VOICE COMM INDIV: CPT

## 2021-09-20 PROCEDURE — 72125 CT NECK SPINE W/O DYE: CPT

## 2021-09-20 PROCEDURE — 97535 SELF CARE MNGMENT TRAINING: CPT

## 2021-09-20 PROCEDURE — 97530 THERAPEUTIC ACTIVITIES: CPT

## 2021-09-20 PROCEDURE — 2060000000 HC ICU INTERMEDIATE R&B

## 2021-09-20 PROCEDURE — 82947 ASSAY GLUCOSE BLOOD QUANT: CPT

## 2021-09-20 PROCEDURE — 6370000000 HC RX 637 (ALT 250 FOR IP): Performed by: STUDENT IN AN ORGANIZED HEALTH CARE EDUCATION/TRAINING PROGRAM

## 2021-09-20 PROCEDURE — 6360000002 HC RX W HCPCS: Performed by: STUDENT IN AN ORGANIZED HEALTH CARE EDUCATION/TRAINING PROGRAM

## 2021-09-20 PROCEDURE — 97110 THERAPEUTIC EXERCISES: CPT

## 2021-09-20 PROCEDURE — 97129 THER IVNTJ 1ST 15 MIN: CPT

## 2021-09-20 PROCEDURE — 70450 CT HEAD/BRAIN W/O DYE: CPT

## 2021-09-20 RX ORDER — POLYETHYLENE GLYCOL 3350 17 G/17G
17 POWDER, FOR SOLUTION ORAL DAILY PRN
Status: DISCONTINUED | OUTPATIENT
Start: 2021-09-20 | End: 2021-09-22 | Stop reason: HOSPADM

## 2021-09-20 RX ADMIN — AMANTADINE HYDROCHLORIDE 100 MG: 50 SOLUTION ORAL at 08:51

## 2021-09-20 RX ADMIN — ENOXAPARIN SODIUM 30 MG: 30 INJECTION SUBCUTANEOUS at 08:51

## 2021-09-20 RX ADMIN — ENOXAPARIN SODIUM 30 MG: 30 INJECTION SUBCUTANEOUS at 22:53

## 2021-09-20 RX ADMIN — AMANTADINE HYDROCHLORIDE 100 MG: 50 SOLUTION ORAL at 18:43

## 2021-09-20 ASSESSMENT — PAIN SCALES - GENERAL
PAINLEVEL_OUTOF10: 8
PAINLEVEL_OUTOF10: 0

## 2021-09-20 ASSESSMENT — PAIN SCALES - WONG BAKER
WONGBAKER_NUMERICALRESPONSE: 0
WONGBAKER_NUMERICALRESPONSE: 0
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
## 2021-09-20 NOTE — PLAN OF CARE
Problem: OXYGENATION/RESPIRATORY FUNCTION  Goal: Patient will maintain patent airway  9/20/2021 0503 by Armando Lira RN  Outcome: Ongoing     Problem: SKIN INTEGRITY  Goal: Skin integrity is maintained or improved  9/20/2021 0503 by Armando Lira RN  Outcome: Ongoing     Problem: Confusion - Acute:  Goal: Absence of continued neurological deterioration signs and symptoms  Description: Absence of continued neurological deterioration signs and symptoms  9/20/2021 0503 by Armando Lira RN  Outcome: Ongoing     Problem: Injury - Risk of, Physical Injury:  Goal: Absence of physical injury  Description: Absence of physical injury  9/20/2021 0503 by Armando Lira RN  Outcome: Ongoing     Problem: Mood - Altered:  Goal: Mood stable  Description: Mood stable  9/20/2021 0503 by Armando Lira RN  Outcome: Ongoing     Problem: Sleep Pattern Disturbance:  Goal: Appears well-rested  Description: Appears well-rested  9/20/2021 0503 by Armando Lira RN  Outcome: Ongoing     Problem: Falls - Risk of:  Goal: Absence of physical injury  Description: Absence of physical injury  9/20/2021 0503 by Armando Lira RN  Outcome: Ongoing

## 2021-09-20 NOTE — PLAN OF CARE
Nutrition Problem #1: Inadequate oral intake  Intervention: Food and/or Nutrient Delivery: Continue Current Diet, Start Oral Nutrition Supplement, Modify Tube Feeding (Provide Ensure Enlive and frozen Magic cup supplements. Modify TF formula to Standard without Fiber - provide boluses of 240 mL x 4 per day if pt consumes less than 50% of meals.)  Nutritional Goals: PO/EN to meet more than 75% of estimated nutrition needs.
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
## 2021-09-20 NOTE — SIGNIFICANT EVENT
SIGNIFICANT EVENT:    Called to the patient's bedside shortly after she was reported to have fallen while in her room at approximately 1430 today. Per nursing staff the patient was reportedly sitting in her bedside chair when they heard the bed alarm go off and when they went inside the room patient was found on the floor having apparently fallen. She was observed immediately after falling and noted to have not lost consciousness and be at her baseline mental status. The patient reported that she lost her balance while trying to get out of bed, denied any symptoms prior to falling, and denied loss of consciousness. The patient also reportedly fell and landed on her left elbow and the left side of her face. The patient did have a small amount of swelling to her left frontal scalp. Patient also reporting some mild tenderness to her left elbow where she fell, however was able to move her left elbow through full range of motion without pain in her left elbow had no external signs of trauma. She did have some midline cervical spine tenderness at approximately C2 to C4. The patient was placed in an Aspen cervical collar and a CT head and cervical spine was ordered which were negative for acute findings. Per nursing staff the patient had been intermittently discontinuing her own cervical collar. Upon reassessment after imaging in the afternoon patient was observed to not be wearing an Aspen collar, and her cervical spine was cleared clinically. Please see separate note for C-spine clearance. I spoke with the patient's mother, Ruthy العراقي, and updated her regarding the patient's fall. Will plan to transfer the patient to the stepdown unit and monitor with a telemetry sitter while remaining in the [place] [id number] unit.     Signed: Blanquita Whiting DO 09/20/21 7:20 PM
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
## 2021-09-21 PROCEDURE — 97129 THER IVNTJ 1ST 15 MIN: CPT

## 2021-09-21 PROCEDURE — 6360000002 HC RX W HCPCS: Performed by: STUDENT IN AN ORGANIZED HEALTH CARE EDUCATION/TRAINING PROGRAM

## 2021-09-21 PROCEDURE — 2060000000 HC ICU INTERMEDIATE R&B

## 2021-09-21 PROCEDURE — 6370000000 HC RX 637 (ALT 250 FOR IP): Performed by: STUDENT IN AN ORGANIZED HEALTH CARE EDUCATION/TRAINING PROGRAM

## 2021-09-21 PROCEDURE — 97530 THERAPEUTIC ACTIVITIES: CPT

## 2021-09-21 PROCEDURE — 6370000000 HC RX 637 (ALT 250 FOR IP): Performed by: NURSE PRACTITIONER

## 2021-09-21 PROCEDURE — 97110 THERAPEUTIC EXERCISES: CPT

## 2021-09-21 PROCEDURE — 92526 ORAL FUNCTION THERAPY: CPT

## 2021-09-21 RX ADMIN — AMANTADINE HYDROCHLORIDE 100 MG: 50 SOLUTION ORAL at 08:23

## 2021-09-21 RX ADMIN — ENOXAPARIN SODIUM 30 MG: 30 INJECTION SUBCUTANEOUS at 20:53

## 2021-09-21 RX ADMIN — PROPRANOLOL HYDROCHLORIDE 10 MG: 10 TABLET ORAL at 14:57

## 2021-09-21 RX ADMIN — ENOXAPARIN SODIUM 30 MG: 30 INJECTION SUBCUTANEOUS at 08:25

## 2021-09-21 RX ADMIN — PROPRANOLOL HYDROCHLORIDE 10 MG: 10 TABLET ORAL at 08:23

## 2021-09-21 RX ADMIN — AMANTADINE HYDROCHLORIDE 100 MG: 50 SOLUTION ORAL at 14:57

## 2021-09-21 RX ADMIN — PROPRANOLOL HYDROCHLORIDE 10 MG: 10 TABLET ORAL at 20:53

## 2021-09-21 ASSESSMENT — PAIN SCALES - GENERAL
PAINLEVEL_OUTOF10: 0

## 2021-09-21 NOTE — CARE COORDINATION
[facility] Flow/Interdisciplinary Rounds Progress Note    Quality Flow Rounds held on September 21, 2021 at [address] [facility] Attending:  Bedside Nurse, ,  and Nursing Unit Leadership    Barriers to Discharge: Pre-Cert    Anticipated Discharge Date:       Anticipated Discharge Disposition: ARU    Readmission Risk              Risk of Unplanned Readmission:  12           Discussed patient goal for the day, patient clinical progression, and barriers to discharge. The following Goal(s) of the Day/Commitment(s) have been identified:  Activity Progression  Transitional Care Plan    Received a VM  from Rehab of  requesting CB for updates. Left VM ([phone]) with request for CB. 1430 Received a call from [facility] with [id number] [facility], they have obtained Pre-Cert. D/C order obtained, request for transport faxed to [facility]. Pt report to be called to [phone]. CAROLA and therapy notes to be faxed to [phone]. RN updated    1500 Spoke with Vipin from [facility]. They are unable to transport patient until 0930 09/22. CM contacted [facility], they are not available. Bruce Black Ra and Brendan Patiño are also not available. RN updated.       [address] and Therapy notes faxed to [phone], then placed in blue transport packet       Sandeep Aleman RN  September 21, 2021

## 2021-09-21 NOTE — ADT AUTH CERT
([facility] [address]   · Subarachnoid hemorrhage (HCC)   · Intraventricular hemorrhage (HCC)   · Sacral fracture (HCC)   · Acute respiratory failure (HCC)   · Acetabulum fracture (HCC)   · Fracture of multiple ribs of left side   · Inferior pubic ramus fracture (HCC)   · Intracranial hypertension           MEDICAL DECISION MAKING AND PLAN       1. Neuro:    1. SDH, SAH, IVH: amantadine, propranolol   2. Tylenol   3. Baseline MRDD   2. CV: VSS   3. Heme: No new labs. VSS   4. Resp: encourage IS and deep breathing. Decannulate. 5. GI: on CLD, advance as tolerated.     6. : Ns issues    7. MSK:   1. Sternal fx   2. LC1 pelvic ring fx and L ant acetabular wall fx    8. ID: afebrile, no esa labs. No indication for abx    9. Endo: no glycemic issues    10. DVT: lovenox 30mg BID   11. Dispo: Rehabilitation, pending pre-CERT       SUBJECTIVE       Audrene Hews improved since yesterday. Passed barium swallow study yesterday with SLP. Will attempt to advance diet as tolerated. NAEON.        OBJECTIVE   VITALS: Temp: Temp: 98.5 °F (36.9 °C)Temp  Av.5 °F (36.9 °C)  Min: 98.1 °F (36.7 °C)  Max: 99 °F (98.9 °C) BP Systolic (69FKS), SYO:428 , Min:95 , LRT:684    Diastolic (72IHH), TST:05, Min:67, Max:81    Pulse Pulse  Av.9  Min: 68  Max: 94 Resp Resp  Avg: 15.1  Min: 13  Max: 18 Pulse ox SpO2  Av.4 %  Min: 99 %  Max: 100 %   GENERAL: alert, no distress   NEURO: alert, nonverbal.    HEENT: Trach decannulated   LUNGS: Symmetric rise and fall bilateral chest walls, no acute respiratory distress. CTAB   HEART: normal rate and regular rhythm   ABDOMEN: soft, non-tender, non-distended and no guarding or peritoneal signs present   EXTREMITY: No cyanosis, no edema       I/O last 3 completed shifts: In: 2115 [NG/GT:2115]   Out: -        Drain/tube output:  In: 5394 [NG/GT:1485]   Out: -        LAB:   CBC: No results for input(s): WBC, HGB, HCT, MCV, PLT in the last 72 hours.    BMP: No results for input(s): NA, K, CL, CO2, BUN, CREATININE, GLUCOSE in the last 72 hours. COAGS: No results for input(s): APTT, PROT, INR in the last 72 hours.       ===PT NOTE      Bed mobility   Supine to Sit: Unable to assess   Sit to Supine: Unable to assess   Scooting: Stand by assistance   Comment: Pt sat EOB and up in chair SBA during scoot   Transfers   Sit to Stand: Minimal Assistance   Stand to sit: Minimal Assistance   Comment: Cues for hand placement with RW with poor return   Ambulation   Ambulation?: Yes   More Ambulation?: Yes   Ambulation 1   Surface: level tile   Device: Rolling Walker   Assistance: Minimal assistance   Quality of Gait: flexed posture, narrow CAROLYNN, difficulty advancing B LE   Gait Deviations: Slow Corine;Decreased step length;Decreased step height;Decreased arm swing;Decreased head and trunk rotation; Shuffles   Distance: 15 ft   Comments: Pt with one LOB, min A to correct.  Pt requires min assistance for use of RW to remain inside CAROLYNN of RW.         ===TRANSITIONAL PLANNING      ACUTE IP REHAB         NO LABS OR RADIOLOGICAL STUDIES ON THIS DATE
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
## 2021-09-21 NOTE — PLAN OF CARE
Problem: OXYGENATION/RESPIRATORY FUNCTION  Goal: Patient will maintain patent airway  Outcome: Ongoing     Problem: OXYGENATION/RESPIRATORY FUNCTION  Goal: Patient will achieve/maintain normal respiratory rate/effort  Description: Respiratory rate and effort will be within normal limits for the patient  Outcome: Ongoing     Problem: SKIN INTEGRITY  Goal: Skin integrity is maintained or improved  Outcome: Ongoing     Problem: Confusion - Acute:  Goal: Absence of continued neurological deterioration signs and symptoms  Description: Absence of continued neurological deterioration signs and symptoms  Outcome: Ongoing     Problem: Confusion - Acute:  Goal: Mental status will be restored to baseline  Description: Mental status will be restored to baseline  Outcome: Ongoing     Problem: Discharge Planning:  Goal: Ability to perform activities of daily living will improve  Description: Ability to perform activities of daily living will improve  Outcome: Ongoing     Problem: Discharge Planning:  Goal: Participates in care planning  Description: Participates in care planning  Outcome: Ongoing     Problem: Injury - Risk of, Physical Injury:  Goal: Absence of physical injury  Description: Absence of physical injury  Outcome: Ongoing     Problem: Injury - Risk of, Physical Injury:  Goal: Will remain free from falls  Description: Will remain free from falls  Outcome: Ongoing     Problem: Mood - Altered:  Goal: Mood stable  Description: Mood stable  Outcome: Ongoing     Problem: Mood - Altered:  Goal: Verbalizations of feeling emotionally comfortable while being cared for will increase  Description: Verbalizations of feeling emotionally comfortable while being cared for will increase  Outcome: Ongoing     Problem: Psychomotor Activity - Altered:  Goal: Absence of psychomotor disturbance signs and symptoms  Description: Absence of psychomotor disturbance signs and symptoms  Outcome: Ongoing     Problem: Sensory Perception - Impaired:  Goal: Demonstrations of improved sensory functioning will increase  Description: Demonstrations of improved sensory functioning will increase  Outcome: Ongoing     Problem: Sensory Perception - Impaired:  Goal: Decrease in sensory misperception frequency  Description: Decrease in sensory misperception frequency  Outcome: Ongoing     Problem: Sensory Perception - Impaired:  Goal: Demonstrates accurate environmental perceptions  Description: Demonstrates accurate environmental perceptions  Outcome: Ongoing     Problem: Sensory Perception - Impaired:  Goal: Able to distinguish between reality-based and nonreality-based thinking  Description: Able to distinguish between reality-based and nonreality-based thinking  Outcome: Ongoing     Problem: Sensory Perception - Impaired:  Goal: Able to interrupt nonreality-based thinking  Description: Able to interrupt nonreality-based thinking  Outcome: Ongoing     Problem: Sleep Pattern Disturbance:  Goal: Appears well-rested  Description: Appears well-rested  Outcome: Ongoing     Problem: Skin Integrity:  Goal: Will show no infection signs and symptoms  Description: Will show no infection signs and symptoms  Outcome: Ongoing     Problem: Skin Integrity:  Goal: Absence of new skin breakdown  Description: Absence of new skin breakdown  Outcome: Ongoing     Problem: Falls - Risk of:  Goal: Absence of physical injury  Description: Absence of physical injury  Outcome: Ongoing     Problem: Falls - Risk of:  Goal: Will remain free from falls  Description: Will remain free from falls  Outcome: Ongoing     Problem: Nutrition  Goal: Optimal nutrition therapy  9/21/2021 0205 by Dany Stephenson RN  Outcome: Ongoing

## 2021-09-22 ENCOUNTER — APPOINTMENT (OUTPATIENT)
Dept: CT IMAGING | Age: 36
DRG: 003 | End: 2021-09-22
Payer: COMMERCIAL

## 2021-09-22 VITALS
BODY MASS INDEX: 20.94 KG/M2 | WEIGHT: 125.66 LBS | HEART RATE: 95 BPM | HEIGHT: 65 IN | OXYGEN SATURATION: 96 % | SYSTOLIC BLOOD PRESSURE: 114 MMHG | RESPIRATION RATE: 12 BRPM | DIASTOLIC BLOOD PRESSURE: 83 MMHG | TEMPERATURE: 98 F

## 2021-09-22 PROCEDURE — 70450 CT HEAD/BRAIN W/O DYE: CPT

## 2021-09-22 PROCEDURE — 72128 CT CHEST SPINE W/O DYE: CPT

## 2021-09-22 PROCEDURE — 6370000000 HC RX 637 (ALT 250 FOR IP): Performed by: STUDENT IN AN ORGANIZED HEALTH CARE EDUCATION/TRAINING PROGRAM

## 2021-09-22 PROCEDURE — 72131 CT LUMBAR SPINE W/O DYE: CPT

## 2021-09-22 PROCEDURE — 6360000002 HC RX W HCPCS: Performed by: STUDENT IN AN ORGANIZED HEALTH CARE EDUCATION/TRAINING PROGRAM

## 2021-09-22 PROCEDURE — 97129 THER IVNTJ 1ST 15 MIN: CPT

## 2021-09-22 PROCEDURE — 6370000000 HC RX 637 (ALT 250 FOR IP): Performed by: NURSE PRACTITIONER

## 2021-09-22 PROCEDURE — 72125 CT NECK SPINE W/O DYE: CPT

## 2021-09-22 PROCEDURE — 97130 THER IVNTJ EA ADDL 15 MIN: CPT

## 2021-09-22 RX ADMIN — PROPRANOLOL HYDROCHLORIDE 10 MG: 10 TABLET ORAL at 09:29

## 2021-09-22 RX ADMIN — ENOXAPARIN SODIUM 30 MG: 30 INJECTION SUBCUTANEOUS at 09:30

## 2021-09-22 RX ADMIN — AMANTADINE HYDROCHLORIDE 100 MG: 50 SOLUTION ORAL at 09:28
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
## 2021-09-22 NOTE — PROGRESS NOTES
Ct scans were completed and primary stated patient is safe for discharge to rehab. Pt belongings gathered. Writer called family with updates for discharge at 1pm. All questions answered.    Electronically signed by Suri Perez RN on 9/22/2021 at 1:52 PM'
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
## 2021-09-22 NOTE — PROGRESS NOTES
Patient fell this AM with nursing. Per nursing she lost her balance while getting dressed and fell backwards hitting her back against the sink. She did not hit her head. Had episode of nausea and small emesis x1 after. CT thoracic, lumbar, cervical and Head all without new traumatic injury. On exam patient with some tenderness to bilateral lumbar paraspinal muscles. No midline tenderness. States she \"feels good\". Patient is OK for discharge to rehab today.      Severiano Kindler, DO  09/22/21  11:44 AM

## 2021-09-22 NOTE — PROGRESS NOTES
Pt ambulating with walker with writer. Non skid foot wear in place. Upon getting up from the toilet patient lost balance falling to the floor. Shara Mercedes attempted to assist to floor but patient still fell landing on her left hip/ buttocks. Pt having complaints of lower back pain 5/10. When going back to bed pt began to get nauseated and had about 30 mL of clear liquid emesis. Vital signs stable. No change to pupils. Primary team made aware. And scans ordered to follow.   Electronically signed by Franky Mckinley RN on 9/22/2021 at 10:30 AM
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
## 2021-10-04 DIAGNOSIS — S32.401A CLOSED DISPLACED FRACTURE OF RIGHT ACETABULUM, UNSPECIFIED PORTION OF ACETABULUM, INITIAL ENCOUNTER (HCC): Primary | ICD-10-CM

## 2021-10-05 ENCOUNTER — OFFICE VISIT (OUTPATIENT)
Dept: ORTHOPEDIC SURGERY | Age: 36
End: 2021-10-05
Payer: COMMERCIAL

## 2021-10-05 VITALS — HEIGHT: 65 IN | BODY MASS INDEX: 20.83 KG/M2 | WEIGHT: 125 LBS

## 2021-10-05 DIAGNOSIS — S32.810D CLOSED PELVIC RING FRACTURE WITH ROUTINE HEALING, SUBSEQUENT ENCOUNTER: Primary | ICD-10-CM

## 2021-10-05 PROBLEM — S32.810A CLOSED PELVIC RING FRACTURE (HCC): Status: ACTIVE | Noted: 2021-10-05

## 2021-10-05 PROCEDURE — 4004F PT TOBACCO SCREEN RCVD TLK: CPT

## 2021-10-05 PROCEDURE — 99213 OFFICE O/P EST LOW 20 MIN: CPT

## 2021-10-05 PROCEDURE — G8427 DOCREV CUR MEDS BY ELIG CLIN: HCPCS

## 2021-10-05 PROCEDURE — G8484 FLU IMMUNIZE NO ADMIN: HCPCS

## 2021-10-05 PROCEDURE — G8420 CALC BMI NORM PARAMETERS: HCPCS

## 2021-10-05 PROCEDURE — 1111F DSCHRG MED/CURRENT MED MERGE: CPT
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
## 2021-10-12 ENCOUNTER — OFFICE VISIT (OUTPATIENT)
Dept: SURGERY | Age: 36
End: 2021-10-12
Payer: COMMERCIAL

## 2021-10-12 VITALS
DIASTOLIC BLOOD PRESSURE: 70 MMHG | SYSTOLIC BLOOD PRESSURE: 120 MMHG | BODY MASS INDEX: 22.66 KG/M2 | WEIGHT: 136 LBS | HEART RATE: 77 BPM | HEIGHT: 65 IN
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
## 2021-10-12 PROCEDURE — 99024 POSTOP FOLLOW-UP VISIT: CPT | Performed by: SPECIALIST

## 2021-10-12 RX ORDER — ACETAMINOPHEN 325 MG/1
TABLET ORAL
COMMUNITY
Start: 2021-10-04
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
## 2021-10-25 ENCOUNTER — OFFICE VISIT (OUTPATIENT)
Dept: NEUROSURGERY | Age: 36
End: 2021-10-25
Payer: COMMERCIAL

## 2021-10-25 VITALS
RESPIRATION RATE: 16 BRPM | HEART RATE: 118 BPM | TEMPERATURE: 97.8 F | DIASTOLIC BLOOD PRESSURE: 84 MMHG | SYSTOLIC BLOOD PRESSURE: 132 MMHG | BODY MASS INDEX: 22.63 KG/M2 | WEIGHT: 136 LBS

## 2021-10-25 DIAGNOSIS — Z98.890 S/P CRANIOTOMY: Primary | ICD-10-CM

## 2021-10-25 DIAGNOSIS — S06.5XAA SUBDURAL HEMATOMA: ICD-10-CM

## 2021-10-25 PROCEDURE — G8484 FLU IMMUNIZE NO ADMIN: HCPCS | Performed by: NEUROLOGICAL SURGERY

## 2021-10-25 PROCEDURE — 99214 OFFICE O/P EST MOD 30 MIN: CPT | Performed by: NEUROLOGICAL SURGERY

## 2021-10-25 PROCEDURE — 1036F TOBACCO NON-USER: CPT | Performed by: NEUROLOGICAL SURGERY

## 2021-10-25 PROCEDURE — G8420 CALC BMI NORM PARAMETERS: HCPCS | Performed by: NEUROLOGICAL SURGERY

## 2021-10-25 PROCEDURE — G8427 DOCREV CUR MEDS BY ELIG CLIN: HCPCS | Performed by: NEUROLOGICAL SURGERY
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
## 2021-11-02 ENCOUNTER — OFFICE VISIT (OUTPATIENT)
Dept: SURGERY | Age: 36
End: 2021-11-02
Payer: COMMERCIAL

## 2021-11-02 VITALS — HEART RATE: 82 BPM | SYSTOLIC BLOOD PRESSURE: 122 MMHG | DIASTOLIC BLOOD PRESSURE: 84 MMHG

## 2021-11-02 DIAGNOSIS — Z09 SURGICAL FOLLOW-UP CARE: Primary | ICD-10-CM

## 2021-11-02 PROCEDURE — 99024 POSTOP FOLLOW-UP VISIT: CPT | Performed by: SPECIALIST
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
## 2024-11-21 ENCOUNTER — OFFICE VISIT (OUTPATIENT)
Age: 39
End: 2024-11-21
Payer: COMMERCIAL

## 2024-11-21 VITALS
HEIGHT: 65 IN | BODY MASS INDEX: 22.66 KG/M2 | RESPIRATION RATE: 16 BRPM | DIASTOLIC BLOOD PRESSURE: 72 MMHG | HEART RATE: 86 BPM | WEIGHT: 136 LBS | SYSTOLIC BLOOD PRESSURE: 114 MMHG

## 2024-11-21 DIAGNOSIS — G89.29 CHRONIC BILATERAL LOW BACK PAIN WITHOUT SCIATICA: ICD-10-CM

## 2024-11-21 DIAGNOSIS — R51.9 NONINTRACTABLE EPISODIC HEADACHE, UNSPECIFIED HEADACHE TYPE: ICD-10-CM
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
## 2024-11-21 DIAGNOSIS — Z98.890 S/P CRANIOTOMY: Primary | ICD-10-CM

## 2024-11-21 DIAGNOSIS — M54.50 CHRONIC BILATERAL LOW BACK PAIN WITHOUT SCIATICA: ICD-10-CM

## 2024-11-21 PROCEDURE — 99204 OFFICE O/P NEW MOD 45 MIN: CPT | Performed by: NEUROLOGICAL SURGERY

## 2024-11-21 NOTE — PROGRESS NOTES
Review of Systems   HENT:  Positive for hearing loss.    Eyes:  Positive for visual disturbance.   Musculoskeletal:  Positive for joint swelling.   Neurological:  Positive for dizziness, numbness and headaches.   All other systems reviewed and are negative.
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
## 2024-12-17 ENCOUNTER — OFFICE VISIT (OUTPATIENT)
Age: 39
End: 2024-12-17
Payer: COMMERCIAL

## 2024-12-17 VITALS
SYSTOLIC BLOOD PRESSURE: 130 MMHG | BODY MASS INDEX: 22.66 KG/M2 | RESPIRATION RATE: 17 BRPM | HEIGHT: 65 IN | WEIGHT: 136 LBS | DIASTOLIC BLOOD PRESSURE: 76 MMHG

## 2024-12-17 DIAGNOSIS — Z98.890 S/P CRANIOTOMY: ICD-10-CM

## 2024-12-17 DIAGNOSIS — R51.9 NONINTRACTABLE EPISODIC HEADACHE, UNSPECIFIED HEADACHE TYPE: Primary | ICD-10-CM

## 2024-12-17 PROCEDURE — 99214 OFFICE O/P EST MOD 30 MIN: CPT | Performed by: NEUROLOGICAL SURGERY
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
## 2024-12-23 DIAGNOSIS — Z98.890 S/P CRANIOTOMY: ICD-10-CM

## 2024-12-23 DIAGNOSIS — R51.9 NONINTRACTABLE EPISODIC HEADACHE, UNSPECIFIED HEADACHE TYPE: ICD-10-CM

## (undated) DEVICE — SPONGE DRN W4XL4IN RAYON/POLYESTER 6 PLY NONWOVEN PRECUT

## (undated) DEVICE — DRAPE,REIN 53X77,STERILE: Brand: MEDLINE

## (undated) DEVICE — CRANIOTOMY DRAPE, STERILE: Brand: MEDLINE

## (undated) DEVICE — TUBING, SUCTION, 9/32" X 20', STRAIGHT: Brand: MEDLINE INDUSTRIES, INC.

## (undated) DEVICE — GRAFT DURA W5XL7IN THK0.6MM CLLGN MEMBRN DURAMATRIX-ONLAY +: Type: IMPLANTABLE DEVICE | Site: BRAIN | Status: NON-FUNCTIONAL

## (undated) DEVICE — GOWN,SIRUS,NONRNF,SETINSLV,XL,20/CS: Brand: MEDLINE

## (undated) DEVICE — BATTERY PACK 2 FOR QUIKDRIVE: Brand: UNIVERSAL NEURO 2, QUIKDRIVE

## (undated) DEVICE — CONNECTOR TBNG WHT PLAS SUCT STR 5IN1 LTWT W/ M CONN

## (undated) DEVICE — GLOVE ORANGE PI 7 1/2   MSG9075

## (undated) DEVICE — ACCUDRAIN® EXTERNAL CSF DRAINAGE SYSTEM WITH ANTI-REFLUX VALVE: Brand: ACCUDRAIN®

## (undated) DEVICE — HOLDER TUBE TRACH LG COTTON STRP HK LOOP CLOSURE BRTRC FOAM

## (undated) DEVICE — SUTURE PROL SZ 2-0 L30IN NONABSORBABLE BLU L26MM SH 1/2 CIR 8833H

## (undated) DEVICE — SVMMC CRANI PK

## (undated) DEVICE — LARGE BLUNT, DUAL PACK: Brand: LINA SKIN HOOK™

## (undated) DEVICE — MIC* SAFETY PERCUTANEOUS ENDOSCOPIC GASTROSTOMY PEG KIT - 20 FR - PULL: Brand: MIC PEG TUBE

## (undated) DEVICE — SVMMC HD AND NK PK

## (undated) DEVICE — CATH 46118 EDM VENT 35CM W/TROCAR

## (undated) DEVICE — BINDER ABD UNISX 9IN 62IN L AND XL UNIV

## (undated) DEVICE — ZYPHR DISPOSABLE CRANIAL PERFORATOR, LARGE 14/11MM: Brand: ZYPHR

## (undated) DEVICE — CLAMP SCALP STR EDGE HVY

## (undated) DEVICE — GAUZE,SPONGE,FLUFF,6"X6.75",STRL,5/TRAY: Brand: MEDLINE

## (undated) DEVICE — SUTURE PERMAHAND SZ 2-0 L12X18IN NONABSORBABLE BLK SILK A185H

## (undated) DEVICE — DISPOSABLE IRRIGATION BIPOLAR CORD, M1000 TYPE: Brand: KIRWAN

## (undated) DEVICE — INTENDED FOR TISSUE SEPARATION, AND OTHER PROCEDURES THAT REQUIRE A SHARP SURGICAL BLADE TO PUNCTURE OR CUT.: Brand: BARD-PARKER ® CARBON RIB-BACK BLADES

## (undated) DEVICE — MARKER,SKIN,WI/RULER AND LABELS: Brand: MEDLINE

## (undated) DEVICE — GLOVE ORANGE PI 8   MSG9080

## (undated) DEVICE — MICRO VENTRICULAR BOLT PRESSURE MONITORING KIT: Brand: CAMINO®

## (undated) DEVICE — OLM INTRACRANIAL PRESSURE MONITORING KIT: Brand: CAMINO®

## (undated) DEVICE — KIT CRAN ACCS NDL 18GA L1.5IN FEN DRP RETRCT SCALP ADSN

## (undated) DEVICE — BLADE ES ELASTOMERIC COAT INSUL DURABLE BEND UPTO 90DEG

## (undated) DEVICE — GOWN,AURORA,NONREINFORCED,LARGE: Brand: MEDLINE

## (undated) DEVICE — SPONGE,NEURO,0.5"X0.5",XR,STRL,10/PK: Brand: MEDLINE

## (undated) DEVICE — DRESSING BORDERED ADH GZ UNIV GEN USE 8INX4IN AND 6INX2IN

## (undated) DEVICE — AGENT HEMSTAT W2XL14IN OXIDIZED REGENERATED CELOS ABSRB FOR

## (undated) DEVICE — 2.3MM TAPERED ROUTER

## (undated) DEVICE — GLOVE SURG SZ 65 THK91MIL LTX FREE SYN POLYISOPRENE

## (undated) DEVICE — SPONGE,NEURO,1"X3",XR,STRL,LF,10/PK: Brand: MEDLINE

## (undated) DEVICE — KIT PEG 20FR STD PUL EN ACCS DEV ENDOVIVE

## (undated) DEVICE — TOWEL,OR,DSP,ST,NATURAL,DLX,4/PK,20PK/CS: Brand: MEDLINE

## (undated) DEVICE — APPLICATOR MEDICATED 10.5 CC SOLUTION HI LT ORNG CHLORAPREP

## (undated) DEVICE — Z DISCONTINUED BY MEDLINE USE 2280062 TUBE TRACH SZ 8 L79MM OD12.2MM ID7.6MM CUF DISP INNR CANN

## (undated) DEVICE — SOLUTION SCRB 4OZ 4% CHG H2O AIDED FOR PREOPERATIVE SKIN

## (undated) DEVICE — GOWN,SIRUS,NONRNF,SETINSLV,2XL,18/CS: Brand: MEDLINE

## (undated) DEVICE — ADHESIVE SKIN CLOSURE TOP 36 CC HI VISC DERMBND MINI

## (undated) DEVICE — Device

## (undated) DEVICE — HYPODERMIC SAFETY NEEDLE: Brand: MAGELLAN

## (undated) DEVICE — 1.5MM WIRE PASS DRILL BIT

## (undated) DEVICE — Z CONVERTED USE 2162213 APPLICATOR PREP 4OZ CHG 4% BACTOSHIELD USE FOR HND WSH AND

## (undated) DEVICE — SPONGE,NEURO,1"X1",XR,STRL,LF,10/PK: Brand: MEDLINE

## (undated) DEVICE — POSITIONER,HEAD,MULTIRING,36CS: Brand: MEDLINE

## (undated) DEVICE — GLOVE SURG SZ 6 THK91MIL LTX FREE SYN POLYISOPRENE ANTI